# Patient Record
Sex: MALE | Race: WHITE | NOT HISPANIC OR LATINO | Employment: OTHER | URBAN - METROPOLITAN AREA
[De-identification: names, ages, dates, MRNs, and addresses within clinical notes are randomized per-mention and may not be internally consistent; named-entity substitution may affect disease eponyms.]

---

## 2017-03-15 ENCOUNTER — OFFICE VISIT - HEALTHEAST (OUTPATIENT)
Dept: FAMILY MEDICINE | Facility: CLINIC | Age: 57
End: 2017-03-15

## 2017-03-15 DIAGNOSIS — B02.9 SHINGLES: ICD-10-CM

## 2017-03-15 ASSESSMENT — MIFFLIN-ST. JEOR: SCORE: 1707.13

## 2017-03-31 ENCOUNTER — OFFICE VISIT - HEALTHEAST (OUTPATIENT)
Dept: FAMILY MEDICINE | Facility: CLINIC | Age: 57
End: 2017-03-31

## 2017-03-31 ENCOUNTER — COMMUNICATION - HEALTHEAST (OUTPATIENT)
Dept: SCHEDULING | Facility: CLINIC | Age: 57
End: 2017-03-31

## 2017-03-31 DIAGNOSIS — B02.9 SHINGLES: ICD-10-CM

## 2017-04-19 ENCOUNTER — COMMUNICATION - HEALTHEAST (OUTPATIENT)
Dept: FAMILY MEDICINE | Facility: CLINIC | Age: 57
End: 2017-04-19

## 2017-06-22 ENCOUNTER — COMMUNICATION - HEALTHEAST (OUTPATIENT)
Dept: FAMILY MEDICINE | Facility: CLINIC | Age: 57
End: 2017-06-22

## 2017-06-23 ENCOUNTER — OFFICE VISIT - HEALTHEAST (OUTPATIENT)
Dept: FAMILY MEDICINE | Facility: CLINIC | Age: 57
End: 2017-06-23

## 2017-06-23 DIAGNOSIS — G47.00 INSOMNIA: ICD-10-CM

## 2017-06-23 DIAGNOSIS — F32.A ANXIETY AND DEPRESSION: ICD-10-CM

## 2017-06-23 DIAGNOSIS — F41.9 ANXIETY AND DEPRESSION: ICD-10-CM

## 2017-06-23 ASSESSMENT — MIFFLIN-ST. JEOR: SCORE: 1670.79

## 2017-09-13 ENCOUNTER — COMMUNICATION - HEALTHEAST (OUTPATIENT)
Dept: FAMILY MEDICINE | Facility: CLINIC | Age: 57
End: 2017-09-13

## 2017-09-13 DIAGNOSIS — F41.9 ANXIETY AND DEPRESSION: ICD-10-CM

## 2017-09-13 DIAGNOSIS — F32.A ANXIETY AND DEPRESSION: ICD-10-CM

## 2018-01-23 ENCOUNTER — COMMUNICATION - HEALTHEAST (OUTPATIENT)
Dept: FAMILY MEDICINE | Facility: CLINIC | Age: 58
End: 2018-01-23

## 2018-01-23 DIAGNOSIS — F41.9 ANXIETY AND DEPRESSION: ICD-10-CM

## 2018-01-23 DIAGNOSIS — F32.A ANXIETY AND DEPRESSION: ICD-10-CM

## 2018-04-07 ENCOUNTER — HOSPITAL ENCOUNTER (INPATIENT)
Dept: MEDSURG UNIT | Facility: CLINIC | Age: 58
Discharge: NURSING, REHAB OR LONG-TERM CARE | End: 2018-04-12
Attending: INTERNAL MEDICINE | Admitting: INTERNAL MEDICINE
Payer: COMMERCIAL

## 2018-04-07 ENCOUNTER — TRANSFERRED RECORDS (OUTPATIENT)
Dept: HEALTH INFORMATION MANAGEMENT | Facility: CLINIC | Age: 58
End: 2018-04-07

## 2018-04-07 DIAGNOSIS — I63.512 ACUTE ISCHEMIC LEFT MCA STROKE (H): ICD-10-CM

## 2018-04-07 DIAGNOSIS — R10.9 FLANK PAIN, ACUTE: ICD-10-CM

## 2018-04-07 LAB
FIBRINOGEN PPP-MCNC: 424 MG/DL (ref 170–510)
GLUCOSE BLDC GLUCOMTR-MCNC: 84 MG/DL
GLUCOSE BLDC GLUCOMTR-MCNC: 89 MG/DL
PLATELET # BLD AUTO: 317 THOU/UL (ref 140–440)
SODIUM SERPL-SCNC: 143 MMOL/L (ref 136–145)

## 2018-04-08 LAB
AORTIC ROOT: 4.2 CM
AORTIC VALVE MEAN VELOCITY: 91.9 CM/S
ASCENDING AORTA: 4.2 CM
AV DIMENSIONLESS INDEX VTI: 0.7
AV MEAN GRADIENT: 4 MMHG
AV PEAK GRADIENT: 7.7 MMHG
AV VALVE AREA: 3.1 CM2
AV VELOCITY RATIO: 0.7
BSA FOR ECHO PROCEDURE: 2.03 M2
CHLORIDE BLD-SCNC: 114 MMOL/L (ref 98–107)
CHOLEST SERPL-MCNC: 138 MG/DL
CO2 SERPL-SCNC: 20 MMOL/L (ref 22–31)
CV BLOOD PRESSURE: NORMAL MMHG
CV ECHO HEIGHT: 75 IN
CV ECHO WEIGHT: 173 LBS
DOP CALC AO PEAK VEL: 139 CM/S
DOP CALC AO VTI: 30.6 CM
DOP CALC LVOT AREA: 4.52 CM2
DOP CALC LVOT DIAMETER: 2.4 CM
DOP CALC LVOT PEAK VEL: 93.9 CM/S
DOP CALC LVOT STROKE VOLUME: 96.3 CM3
DOP CALCLVOT PEAK VEL VTI: 21.3 CM
EJECTION FRACTION: 63 % (ref 55–75)
FASTING STATUS PATIENT QL REPORTED: YES
FRACTIONAL SHORTENING: 34.5 % (ref 28–44)
GLUCOSE BLDC GLUCOMTR-MCNC: 108 MG/DL
GLUCOSE BLDC GLUCOMTR-MCNC: 88 MG/DL
GLUCOSE BLDC GLUCOMTR-MCNC: 88 MG/DL
GLUCOSE BLDC GLUCOMTR-MCNC: 92 MG/DL
GLUCOSE BLDC GLUCOMTR-MCNC: 99 MG/DL
HDLC SERPL-MCNC: 40 MG/DL
INTERVENTRICULAR SEPTUM IN END DIASTOLE: 0.98 CM (ref 0.6–1)
IVS/PW RATIO: 1
LA AREA 1: 24 CM2
LA AREA 2: 24.1 CM2
LDLC SERPL CALC-MCNC: 81 MG/DL
LEFT ATRIUM LENGTH: 6.1 CM
LEFT ATRIUM SIZE: 3.5 CM
LEFT ATRIUM VOLUME INDEX: 39.7 ML/M2
LEFT ATRIUM VOLUME: 80.6 ML
LEFT VENTRICLE CARDIAC INDEX: 4 L/MIN/M2
LEFT VENTRICLE CARDIAC OUTPUT: 8.1 L/MIN
LEFT VENTRICLE DIASTOLIC VOLUME INDEX: 59.6 CM3/M2 (ref 34–74)
LEFT VENTRICLE DIASTOLIC VOLUME: 121 CM3 (ref 62–150)
LEFT VENTRICLE HEART RATE: 84 BPM
LEFT VENTRICLE MASS INDEX: 88.5 G/M2
LEFT VENTRICLE SYSTOLIC VOLUME INDEX: 22.1 CM3/M2 (ref 11–31)
LEFT VENTRICLE SYSTOLIC VOLUME: 44.8 CM3 (ref 21–61)
LEFT VENTRICULAR INTERNAL DIMENSION IN DIASTOLE: 5.01 CM (ref 4.2–5.8)
LEFT VENTRICULAR INTERNAL DIMENSION IN SYSTOLE: 3.28 CM (ref 2.5–4)
LEFT VENTRICULAR MASS: 179.6 G
LEFT VENTRICULAR OUTFLOW TRACT MEAN GRADIENT: 2 MMHG
LEFT VENTRICULAR OUTFLOW TRACT MEAN VELOCITY: 62.8 CM/S
LEFT VENTRICULAR OUTFLOW TRACT PEAK GRADIENT: 4 MMHG
LEFT VENTRICULAR POSTERIOR WALL IN END DIASTOLE: 1 CM (ref 0.6–1)
LV STROKE VOLUME INDEX: 47.4 ML/M2
MAGNESIUM SERPL-MCNC: 1.9 MG/DL (ref 1.8–2.6)
MITRAL VALVE E/A RATIO: 0.6
MV AVERAGE E/E' RATIO: 6 CM/S
MV DECELERATION TIME: 246 MS
MV E'TISSUE VEL-LAT: 11 CM/S
MV E'TISSUE VEL-MED: 9.46 CM/S
MV LATERAL E/E' RATIO: 5.6
MV MEDIAL E/E' RATIO: 6.5
MV PEAK A VELOCITY: 95.1 CM/S
MV PEAK E VELOCITY: 61.6 CM/S
NUC REST DIASTOLIC VOLUME INDEX: 2760 LBS
NUC REST SYSTOLIC VOLUME INDEX: 75 IN
PHOSPHATE SERPL-MCNC: 2.6 MG/DL (ref 2.5–4.5)
SODIUM SERPL-SCNC: 140 MMOL/L (ref 136–145)
SODIUM SERPL-SCNC: 141 MMOL/L (ref 136–145)
SODIUM SERPL-SCNC: 143 MMOL/L (ref 136–145)
SODIUM SERPL-SCNC: 143 MMOL/L (ref 136–145)
TRICUSPID VALVE ANULAR PLANE SYSTOLIC EXCURSION: 2.5 CM
TRIGL SERPL-MCNC: 86 MG/DL

## 2018-04-08 ASSESSMENT — MIFFLIN-ST. JEOR: SCORE: 1688.08

## 2018-04-09 LAB
AT III ACT/NOR PPP CHRO: 82 % (ref 85–135)
CHLORIDE BLD-SCNC: 109 MMOL/L (ref 98–107)
CO2 SERPL-SCNC: 24 MMOL/L (ref 22–31)
GLUCOSE BLDC GLUCOMTR-MCNC: 101 MG/DL
GLUCOSE BLDC GLUCOMTR-MCNC: 103 MG/DL
GLUCOSE BLDC GLUCOMTR-MCNC: 113 MG/DL
GLUCOSE BLDC GLUCOMTR-MCNC: 88 MG/DL
GLUCOSE BLDC GLUCOMTR-MCNC: 89 MG/DL
PLATELET # BLD AUTO: 257 THOU/UL (ref 140–440)
PROTHROM ACT/NOR PPP: 101 % (ref 60–140)
SODIUM SERPL-SCNC: 142 MMOL/L (ref 136–145)
THROMBIN TIME: 16.8 SEC (ref 13–19)

## 2018-04-09 ASSESSMENT — MIFFLIN-ST. JEOR: SCORE: 1695.8

## 2018-04-10 LAB
ANION GAP SERPL CALCULATED.3IONS-SCNC: 8 MMOL/L (ref 5–18)
BASOPHILS # BLD AUTO: 0.1 THOU/UL (ref 0–0.2)
BASOPHILS NFR BLD AUTO: 1 % (ref 0–2)
BUN SERPL-MCNC: 6 MG/DL (ref 8–22)
CALCIUM SERPL-MCNC: 8.7 MG/DL (ref 8.5–10.5)
CHLORIDE BLD-SCNC: 109 MMOL/L (ref 98–107)
CO2 SERPL-SCNC: 24 MMOL/L (ref 22–31)
CREAT SERPL-MCNC: 0.67 MG/DL (ref 0.7–1.3)
EOSINOPHIL # BLD AUTO: 0.1 THOU/UL (ref 0–0.4)
EOSINOPHIL NFR BLD AUTO: 1 % (ref 0–6)
ERYTHROCYTE [DISTWIDTH] IN BLOOD BY AUTOMATED COUNT: 12.3 % (ref 11–14.5)
FASTING STATUS PATIENT QL REPORTED: YES
GFR SERPL CREATININE-BSD FRML MDRD: >60 ML/MIN/1.73M2
GLUCOSE BLD-MCNC: 94 MG/DL (ref 70–125)
GLUCOSE BLDC GLUCOMTR-MCNC: 102 MG/DL
GLUCOSE BLDC GLUCOMTR-MCNC: 85 MG/DL
GLUCOSE BLDC GLUCOMTR-MCNC: 88 MG/DL
GLUCOSE BLDC GLUCOMTR-MCNC: 91 MG/DL
HCT VFR BLD AUTO: 33.9 % (ref 40–54)
HGB BLD-MCNC: 11.3 G/DL (ref 14–18)
HOMOCYSTEINE PLASMA - HISTORICAL: 9 UMOL/L (ref 0–13)
LA PPP-IMP: NEGATIVE
LYMPHOCYTES # BLD AUTO: 1.6 THOU/UL (ref 0.8–4.4)
LYMPHOCYTES NFR BLD AUTO: 18 % (ref 20–40)
MCH RBC QN AUTO: 31.1 PG (ref 27–34)
MCHC RBC AUTO-ENTMCNC: 33.3 G/DL (ref 32–36)
MCV RBC AUTO: 93 FL (ref 80–100)
MONOCYTES # BLD AUTO: 1.2 THOU/UL (ref 0–0.9)
MONOCYTES NFR BLD AUTO: 14 % (ref 2–10)
NEUTROPHILS # BLD AUTO: 5.9 THOU/UL (ref 2–7.7)
NEUTROPHILS NFR BLD AUTO: 66 % (ref 50–70)
PLATELET # BLD AUTO: 248 THOU/UL (ref 140–440)
PMV BLD AUTO: 10.4 FL (ref 8.5–12.5)
POTASSIUM BLD-SCNC: 3.7 MMOL/L (ref 3.5–5)
PROT C ACT/NOR PPP CHRO: 113 % (ref 70–170)
RBC # BLD AUTO: 3.63 MILL/UL (ref 4.4–6.2)
SODIUM SERPL-SCNC: 140 MMOL/L (ref 136–145)
SODIUM SERPL-SCNC: 140 MMOL/L (ref 136–145)
SODIUM SERPL-SCNC: 141 MMOL/L (ref 136–145)
SODIUM SERPL-SCNC: 161 MMOL/L (ref 136–145)
WBC: 8.8 THOU/UL (ref 4–11)

## 2018-04-10 ASSESSMENT — MIFFLIN-ST. JEOR: SCORE: 1698.52

## 2018-04-11 LAB
GLUCOSE BLDC GLUCOMTR-MCNC: 102 MG/DL
GLUCOSE BLDC GLUCOMTR-MCNC: 109 MG/DL
GLUCOSE BLDC GLUCOMTR-MCNC: 113 MG/DL
GLUCOSE BLDC GLUCOMTR-MCNC: 91 MG/DL
PLATELET # BLD AUTO: 223 THOU/UL (ref 140–440)
SODIUM SERPL-SCNC: 138 MMOL/L (ref 136–145)

## 2018-04-12 ENCOUNTER — RECORDS - HEALTHEAST (OUTPATIENT)
Dept: ADMINISTRATIVE | Facility: OTHER | Age: 58
End: 2018-04-12

## 2018-04-12 ENCOUNTER — HOSPITAL ENCOUNTER (INPATIENT)
Facility: CLINIC | Age: 58
LOS: 5 days | Discharge: HOME | End: 2018-04-17
Attending: PHYSICAL MEDICINE & REHABILITATION | Admitting: PHYSICAL MEDICINE & REHABILITATION
Payer: COMMERCIAL

## 2018-04-12 DIAGNOSIS — K59.01 SLOW TRANSIT CONSTIPATION: ICD-10-CM

## 2018-04-12 DIAGNOSIS — E78.5 HYPERLIPIDEMIA LDL GOAL <70: Primary | ICD-10-CM

## 2018-04-12 DIAGNOSIS — I69.320 APHASIA AS LATE EFFECT OF STROKE: ICD-10-CM

## 2018-04-12 DIAGNOSIS — I63.512 CEREBROVASCULAR ACCIDENT (CVA) DUE TO OCCLUSION OF LEFT MIDDLE CEREBRAL ARTERY (H): ICD-10-CM

## 2018-04-12 PROBLEM — I63.9 STROKE (H): Status: ACTIVE | Noted: 2018-04-12

## 2018-04-12 LAB
FACTOR V LEIDEN - HISTORICAL: NORMAL
GLUCOSE BLDC GLUCOMTR-MCNC: 92 MG/DL
SPECIMEN STATUS: NORMAL
THROMBOPHILIA RISK ASSESSMENT-FVL: NORMAL

## 2018-04-12 PROCEDURE — 12800006 ZZH R&B REHAB

## 2018-04-12 PROCEDURE — 25000132 ZZH RX MED GY IP 250 OP 250 PS 637: Performed by: PHYSICAL MEDICINE & REHABILITATION

## 2018-04-12 RX ORDER — SIMVASTATIN 20 MG
20 TABLET ORAL EVERY EVENING
Status: DISCONTINUED | OUTPATIENT
Start: 2018-04-12 | End: 2018-04-17 | Stop reason: HOSPADM

## 2018-04-12 RX ORDER — POLYETHYLENE GLYCOL 3350 17 G/17G
17 POWDER, FOR SOLUTION ORAL DAILY
Status: DISCONTINUED | OUTPATIENT
Start: 2018-04-13 | End: 2018-04-17 | Stop reason: HOSPADM

## 2018-04-12 RX ORDER — NALOXONE HYDROCHLORIDE 0.4 MG/ML
.1-.4 INJECTION, SOLUTION INTRAMUSCULAR; INTRAVENOUS; SUBCUTANEOUS
Status: DISCONTINUED | OUTPATIENT
Start: 2018-04-12 | End: 2018-04-16

## 2018-04-12 RX ORDER — ASPIRIN 81 MG/1
81 TABLET, CHEWABLE ORAL DAILY
Status: DISCONTINUED | OUTPATIENT
Start: 2018-04-12 | End: 2018-04-17 | Stop reason: HOSPADM

## 2018-04-12 RX ORDER — TRAMADOL HYDROCHLORIDE 50 MG/1
50 TABLET ORAL EVERY 6 HOURS PRN
Status: DISCONTINUED | OUTPATIENT
Start: 2018-04-12 | End: 2018-04-16

## 2018-04-12 RX ORDER — AMOXICILLIN 250 MG
1-2 CAPSULE ORAL 2 TIMES DAILY
Status: DISCONTINUED | OUTPATIENT
Start: 2018-04-12 | End: 2018-04-17 | Stop reason: HOSPADM

## 2018-04-12 RX ORDER — ACETAMINOPHEN 325 MG/1
325-975 TABLET ORAL EVERY 6 HOURS PRN
Status: DISCONTINUED | OUTPATIENT
Start: 2018-04-12 | End: 2018-04-17 | Stop reason: HOSPADM

## 2018-04-12 RX ADMIN — TRAMADOL HYDROCHLORIDE 50 MG: 50 TABLET, COATED ORAL at 20:16

## 2018-04-12 RX ADMIN — ASPIRIN 81 MG CHEWABLE TABLET 81 MG: 81 TABLET CHEWABLE at 20:13

## 2018-04-12 RX ADMIN — SIMVASTATIN 20 MG: 20 TABLET, FILM COATED ORAL at 20:13

## 2018-04-12 RX ADMIN — ACETAMINOPHEN 975 MG: 325 TABLET ORAL at 15:55

## 2018-04-12 RX ADMIN — SENNOSIDES AND DOCUSATE SODIUM 1 TABLET: 8.6; 5 TABLET ORAL at 20:13

## 2018-04-12 NOTE — LETTER
April 17, 2018        Fellow providers, below is copy of discharge summary for continuity care. He will see primary provider Dr. Bruce.  Patient/wife choosing to establish with neurologist through [facility] Dr. Sebastian.    Mr. Zhen Sherman  [address] [place] [address]      Thank you for support, please contact me if you have follow-up questions or concerns.    Reggie Hilton MD  Medical Director, Acute Rehabilitation Center    Department of Physical Medicine and Rehabilitation  [facility]   Discharge Summary      Date Admitted: 4/12/2018  Date Discharge: 4/17/2018     Discharge Disposition: Home with supervision from wife and other friends/family     Discharge Diagnosis:   1. Ischemic stroke left hemisphere secondary to internal carotid artery dissection  2. Significant receptive aphasia, pure word deafness  3. Hyperlipidemia  4. High functioning autism spectrum     Brief History of Presenting Illness and Hospital Course:  57-year-old with left 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  Deficits with severe receptive aphasia, predominant auditory but some visual/reading.  Admitted to inpatient acute rehab 4/12/18.     Herminio participated in physical occupational and speech therapies though PT dropped off quickly after first day evaluation was at high level without any deficits identified.  His predominant deficit remains in the language domain cascading into some of his activities of daily living.  He still has little to no comprehension of spoken words.  He is able to understand written communication fairly well but still needs to be at shorter sentence levels.  There are cognitive domain deficits as well and he does lack some insight into the extent of his deficits.  Because of this we are recommending initial 24 hour  "supervision.  Psychosocially Herminio was in process of divorce with his wife, had been  for 9 months though she is having him come back to stay at her home initially for the supervision.  She is reaching out to other coworkers friends and family to augment.  Herminio is very particular and some of his mannerisms consistent with his high functioning autism.  He has close coworkers at the company he has been at for 30 + years.     Medically no issues came up during his rehab stay. Stroke risk reduction aspirin 81 mg daily. Simvastatin 20 mg daily for cholesterol (new)  Blood pressures well controlled without medication.  Herminio believes his mood is doing okay.  His wife acknowledges this as well.  He has been on fluoxetine in his past but not immediately prior to the stroke.  Continue to monitor.     Discharge Medications:                Zhen Sherman   Home Medication Instructions YASIR:[id number]     Printed on:04/17/18 1935   Medication Information                                       aspirin 81 MG chewable tablet  Take 1 tablet (81 mg) by mouth daily                   senna-docusate (SENOKOT-S;PERICOLACE) 8.6-50 MG per tablet  Take 1-2 tablets by mouth 2 times daily as needed for constipation                   simvastatin (ZOCOR) 20 MG tablet  Take 1 tablet (20 mg) by mouth every evening                      Physical Examination:   /70 (BP Location: Left arm)  Pulse 66  Temp 95.8  F (35.4  C) (Oral)  Resp 12  Ht 1.88 m (6' 2\")  Wt 78.1 kg (172 lb 3.2 oz)  SpO2 100%  BMI 22.11 kg/m2  Fully alert, pleasant.  Communication receptively by him reading written sentences.  He will sometimes right back in response but can also vocalize fairly well.    Heart regular rate and rhythm lungs clear.  Strong symmetric bilateral 5/5 strength and coordination normal     Discharge Instructions:     Active Diet Order      Diet            regular consistency thin liquids  Activity: Okay up independent within closed " environments, 24 hour supervision until communication and safety in different situations better established.  Someone to be with him for all community settings.  Definitely no driving until further screening and likely formal behind the wheel assessment.     Follow up Appointments:  Herminio has received care in several systems, primary provider Dr. Bruce with [facility], initial hospitalization at [facility] transferred to [facility] acute rehab center.  They are choosing to follow-up with a neurologist through [facility] Dr. Sebastian.  Outpatient therapies choosing to do through [facility].

## 2018-04-12 NOTE — H&P
[facility]           ACUTE REHABILITATION [place]                HISTORY AND PHYSICAL NOTE         Patient Name: Zhen Sherman   YOB: 1960  MRN: [id number]     Age / Sex: 57 year old male    Admit Date: 04/12/18    Reason for Admission: intense rehabilitation for left MCA stroke    History of Present Illness  Zhen Sherman is a 57 year old male who is being admitted to the ARU on 04/12/18 as a transfer from [facility] following a left MCA stroke secondary to left ICA dissection.    He was initially brought to the ER by his wife after showing signs of confusion and speech deficits.  He did not have any weakness in his extremities at that time.  Work up is consistent with Cerebral angiogram which showed complete occlusion of the mid cervical left internal carotid due to dissection.  Other workup included a CT scan which showed low attenuation changes in the lateral and posterior aspects of the left MCA distribution.    CTA of the neck showed occlusion of the left ICA beginning just distal to its origin and extending throughout its cervical extent on the left.    MRI showed evolving infarcts in the left insula left temporal lobe and the left temporal occipital cortex.    He also underwent an echocardiogram which showed an EF of 63%, normal right ventricular size wall thickness and wall motion.  He has bicuspid aortic valve without stenosis.    Labs were reviewed today from the papers that were forwarded from the other hospital  Sodium 140  Glucose 91  On 10 April his sodium was 161  Platelets 223    His lipid profile was normal  Protein C homocysteine and factor II assay were normal  Antithrombin III activity was borderline low at suggests congenital or acquired Antithrombin deficiency.  Current plan is to repeat this study in 4-6 weeks to determine the effect of acute thrombotic event.    He was started on aspirin and simvastatin for secondary prevention of  "stroke.    Functionally, the patient was independent with all aspects of mobility and ADLs      Currently, the patient is medically appropriate and is assessed to have needs and will benefit from an inpatient acute rehabilitation comprehensive program working with Physical and Occupational Therapist and will benefit from supervision and management of Rehab Nursing and Rehab MD.     Allergies  Allergies not on file    Past Medical and Surgical History  He has no specific past medical history.    Social History  The patient lives lives in a house with no steps to enter.    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  He has 2 children ages 13 and 11 who alternates staying with him and his wife.  His wife has been currently involved with caring for the patient. She is at her bedside and she reports that she would rather be referred as his wife rather than ex wife, as the divorce is not through     His other system includes his mother and his.  The patient was previously independent with ambulation without use of cane or walker, previously independent with upper and lower body self care, ADLs, and IADLs.         Review of Systems  10 point ROS neg other than the symptoms noted above in the HPI and below:  Denies any pain   Denies any weakness  Some of the ROS given by his wife  Sleeping well  Appetite is good   Remainder of the review of the systems was negative.      Physical Examination  /84  Pulse 72  Temp 97.5  F (36.4  C) (Oral)  Resp 16  Ht 1.88 m (6' 2\")  Wt 78.5 kg (173 lb 1.6 oz)  SpO2 98%  BMI 22.22 kg/m2  General Awake, alert, not in distress  HEENT EOMs intact  Cardiac Regular  Respiratory Clear breath sounds  Abdomen Soft, nontender     Neurologic   Patient is awake and alert   He is sitting in bed  Frustrated  Able to speak fluently   Comprehension is severely impaired    Intermittently  he is able to read and understand what he wrote, at other times, he is unable to " understand   Extremities ate 5/5     Medications  Current Facility-Administered Medications   Medication     aspirin chewable tablet 81 mg     simvastatin (ZOCOR) tablet 20 mg     traMADol (ULTRAM) tablet 50 mg     acetaminophen (TYLENOL) tablet 325-975 mg     senna-docusate (SENOKOT-S;PERICOLACE) 8.6-50 MG per tablet 1-2 tablet     [START ON 4/13/2018] polyethylene glycol (MIRALAX/GLYCOLAX) Packet 17 g     naloxone (NARCAN) injection 0.1-0.4 mg         ASSESSMENT / MANAGEMENT AND INITIATION OF PLAN OF CARE:      POST-ADMISSION EVALUATION:  I have evaluated the patient on admission to the Acute Rehab Center and compared with the preadmission screen, no significant differences are identified and remains appropriate for acute rehabilitation. See below for more details and specifics regarding this admission that supports requiring medical and therapy intensity in the acute rehab setting.      Patient will work with PT for 60 min daily to work on gait exercises, strengthening, endurance buildup, transfers with use of walker as needed.   Patient will work with OT for 60 min daily to work on upper and lower body self care, dressing, toileting, bathing, energy conservation techniques with use of ADs as needed.   Patient will work with SLP for 60 min daily for cognitive evaluation and treatment strategies for higher level cognitive deficits and memory impairment.   Rehab RN to administer medication, patient education on medication taking, VS monitoring, and surgical wound dressing changes and monitoring.     Medical Management:  Left MCA stroke; continues secondary prevention with aspirin and statins    His lipid profile was evaluated this admission and was found to be within normal limits, he is however on statins for secondary prevention of stroke.  Acquired Antithrombin deficiency; needs to be further evaluated with repeat labs in 4-6 weeks.    Bladder, Bowel, GI and DVT ppx   Bladder ;check PVRs ×3 straight cath for  volumes more than 350 cc.  Monitor for symptoms of urinary tract infection, rehab nursing to send for a UA as needed  Bowels; place the patient on a bowel program and titrate medications as needed.  Hold the bowel program in case of loose stools.  Educated patient on Impact with fiber and fluid intake on a bowel function  DVT prophylaxis with mechanical means    Code Status full     Prognosis for medical improvement and stabilization of the medical issues for discharge to home is good.     Estimated Length of Stay: 1-2 weeks      Post Admission Physician Evaluation:     I have compared Zhen Sherman's condition on admission to acute rehabilitation to that outlined in the preadmission screen. History and physical exam performed by me.    No significant differences are identified and the patient remains appropriate for an inpatient rehabilitation facility level of care to manage medical issues and address functional impairments due to Debility/stroke      Comorbid medical conditions being managed:   No specific past medical history he presents with a left MCA stroke leading to aphasia auditory verbal agnosia, hemianopia to the right knee, pronator drift and pathfinding impairment     Prior functional level: Previously independent with mobility and ADLs, although had become less active over the last several months due to progression of illness.      Present function:   Difficulty with path finding, impaired balance, he standby assist with ADLs with poor initiation and attention.     Anticipated rehabilitation course: Anticipate he  will require 2-3 weeks. Anticipate he will be discharged home with hisfamily for support      Will benefit from intensive rehabilitation includin minutes each of PT, OT and SLP - seven days a week. Anticipate he will be able to tolerate the intensity of the therapies.        Rehabilitation nursing; has rehab nursing needs in the reenforcement of the strategies, taught by the  therapists, and bowel and bladder management   Close management by physiatry.      Prognosis:  fair    Estimated length of stay: 1-2 weeks        Karen Silva MD, MHA   Department of Physical Medicine and Rehabilitation  [facility]     Total time spent: 70minutes with more than half the time was spent counseling and / or coordination of care   Includes counseling / education / discussion     Karen Silva MD, A

## 2018-04-12 NOTE — IP AVS SNAPSHOT
MRN:[id number]                      After Visit Summary   4/12/2018    Zhen Sherman    MRN: [id number]           Thank you!     Thank you for choosing [facility] for your care. Our goal is always to provide you with excellent care. Hearing back from our patients is one way we can continue to improve our services. Please take a few minutes to complete the written survey that you may receive in the mail after you visit with us. Thank you!        Patient Information     Date Of Birth          1960        About your hospital stay     You were admitted on:  April 12, 2018 You last received care in the:   ACUTE REHAB [facility]    You were discharged on:  April 17, 2018        Reason for your hospital stay       Admitted to inpatient rehab following stroke and hospitalization.                  Who to Call     For medical emergencies, please call 911.  For non-urgent questions about your medical care, please call your primary care provider or clinic, [phone]          Attending Provider     Provider Specialty    Reggie Hilton MD Physical Medicine and Rehab       Primary Care Provider Office Phone # Fax #    Huy Bruce [phone]      After Care Instructions     Activity       Recommended activity at discharge:   Because of your stroke, you have difficulty with communication especially understanding spoken words.   Also some deficits with problem solving and memory that are difficult to assess because of the language difficulties.  For now recommending initial 24 hour supervision until safety with different activities is more thoroughly established.  You may walk independently within the home but have someone with you for out in the community settings.  No driving at this time. Will need to undergo a formal behind the wheel driving assessment prior to resuming driving.  Return to work timeframe to be determined based on your recovery and improving communication.            Diet        No restrictions to diet.                  Additional Services     Occupational Therapy Referral       Treatment: Evaluation & Treatment  Special Instructions/Modalities: Continue outpatient speech and occupational therapies.  Significant auditory receptive language /aphasia deficits.  Exploring safety in different settings and instrumental ADLs.  Also pre-driving screen and help with formal behind the wheel when ready.    Please be aware that coverage of these services is subject to the terms and limitations of your health insurance plan.  Call member services at your health plan with any benefit or coverage questions.            Speech Therapy Referral       Treatment: Evaluation & Treatment  Special Instructions/Modalities: Continue outpatient speech and occupational therapies.  Significant auditory receptive language /aphasia deficits.  Exploring safety in different settings and instrumental ADLs.  Also pre-driving screen and help with formal behind the wheel when ready.    Please be aware that coverage of these services is subject to the terms and limitations of your health insurance plan.  Call member services at your health plan with any benefit or coverage questions.                  Further instructions from your care team       Follow up appointments:    -- Primary care within 1-2 weeks.  You are scheduled to see Dr. Huy Bruce on Wednesday, April 25th at 10:50. Please arrive 15 minutes early (10:35 am) to check in.    Address  [facility]                          [address] [place] [address]   Phone   [phone]    -- Neurology in 3-4 weeks. Patient / wife outlined wanting to see Dr. Naida Stovall through [facility]. Family will schedule this appointment.  Address  [facility]                          [address] [place] [address]  Phone   " [phone]  Or  Address  [facility]                          [address] [place] [address]  Phone   [phone]    -- You can follow up with Dr. Reggie Hilton on an as needed basis if any functional or rehab issues persist not otherwise covered by your therapists and neurologist.  Call if you would like to schedule.  Address  [facility]                          Physical Medicine and Rehabilitation Clinic    [address] [place] [address]; [address]    [place] [address]    Phone   [phone]    -----------------------------------------------  To reduce the risk of subsequent stroke there are several important factors including optimal management of anticoagulants, blood pressure, cholesterol, diabetes and smoking abstinence.    Anticoagulation:  You are on Aspirin    Blood Pressure:  Keeping your blood pressures less than 130/80 has been shown to reduce risk of recurrent stroke.   You are currently not requiring any medication.     Diabetes:  You do not have diabetes though it is important to continue monitoring for this in the future with your primary provider.    Cholesterol:  Traditional target levels for LDL cholesterol or \"bad cholesterol\" is less than 130 however once you have had a stroke, your target LDL level is now less than 70. Additional recommendations such as increasing your HDL or \"good\" cholesterol and lowering your triglyceride level can also be important.    Your most recent lipid panel was   Total Cholesterol 138   Triglycerides 86   HDL Cholesterol 40   LDL Calculated 81     You were started on simvastatin to further reduce cholesterol and reduce risk for stroke.    This should be followed up in 2-3 months with your primary provider.    Smoking:  Do not start smoking now Herminio!  finally one of the most important modifiable risk factors is to not smoke. This includes cigarettes, pipes, cigars, chewing tobacco and second hand " "smoke. Support through counseling, nicotine replacement, and oral smoking-cessation medications may all be helpful. Often people have been able to quit during their hospitalization but once returning to their familiar environment, the urges can be stronger. If this is the case, we encourage you to get support. There are numerous options, start by talking with your doctor.      Pending Results     No orders found from 4/10/2018 to 2018.            Statement of Approval     Ordered          18 0944  I have reviewed and agree with all the recommendations and orders detailed in this document.  EFFECTIVE NOW     Approved and electronically signed by:  Reggie Hilton MD             Admission Information     Date & Time Provider Department Dept. Phone    2018 Reggie Hilton MD  ACUTE REHAB [phone]      Your Vitals Were     Blood Pressure Pulse Temperature Respirations Height Weight    121/70 (BP Location: Left arm) 66 95.8  F (35.4  C) (Oral) 12 1.88 m (6' 2\") 78.1 kg (172 lb 3.2 oz)    Pulse Oximetry BMI (Body Mass Index)                100% 22.11 kg/m2          [employer] Information     [employer] lets you send messages to your doctor, view your test results, renew your prescriptions, schedule appointments and more. To sign up, go to [email or web address] . Click on \"Log in\" on the left side of the screen, which will take you to the Welcome page. Then click on \"Sign up Now\" on the right side of the page.     You will be asked to enter the access code listed below, as well as some personal information. Please follow the directions to create your username and password.     Your access code is: [email or web address]  Expires: 2018  8:52 AM     Your access code will  in 90 days. If you need help or a new code, please call your [facility] clinic or [phone].        Care EveryWhere ID     This is your Care EveryWhere ID. This could be used by other organizations to access your [facility] medical " records  [id number]        Equal Access to Services     DANNA TITO : Hadii nikolas reaves jose Socarolali, waaxda luqadaha, qaybta kavanessada aliciaanabela, waxalcira elisain hayaashahram vargasnimesh colorado daksha angulo. So [facility] [phone].    ATENCIÓN: Si habla español, tiene a clements disposición servicios gratuitos de asistencia lingüística. Januarynery al [phone].    We comply with applicable federal civil rights laws and [place] laws. We do not discriminate on the basis of race, color, national origin, age, disability, sex, sexual orientation, or gender identity.               Review of your medicines      START taking        Dose / Directions    aspirin 81 MG chewable tablet        Dose:  81 mg   Take 1 tablet (81 mg) by mouth daily   Refills:  0       senna-docusate 8.6-50 MG per tablet   Commonly known as:  SENOKOT-S;PERICOLACE   Used for:  Slow transit constipation        Dose:  1-2 tablet   Take 1-2 tablets by mouth 2 times daily as needed for constipation   Refills:  0       simvastatin 20 MG tablet   Commonly known as:  ZOCOR   Used for:  Hyperlipidemia LDL goal <70        Dose:  20 mg   Take 1 tablet (20 mg) by mouth every evening   Quantity:  30 tablet   Refills:  0            Where to get your medicines      These medications were sent to [facility] [place], [place] - [address]  [address] [address]     Phone:  [phone]     simvastatin 20 MG tablet         Some of these will need a paper prescription and others can be bought over the counter. Ask your nurse if you have questions.     You don't need a prescription for these medications     aspirin 81 MG chewable tablet    senna-docusate 8.6-50 MG per tablet                Protect others around you: Learn how to safely use, store and throw away your medicines at www.disposemymeds.org.             Medication List: This is a list of all your medications and when to take them. Check marks below indicate your daily home schedule. Keep  this list as a reference.      Medications           Morning Afternoon Evening Bedtime As Needed    aspirin 81 MG chewable tablet   Take 1 tablet (81 mg) by mouth daily   Last time this was given:  81 mg on 4/16/2018  9:20 PM                     8 PM           senna-docusate 8.6-50 MG per tablet   Commonly known as:  SENOKOT-S;PERICOLACE   Take 1-2 tablets by mouth 2 times daily as needed for constipation   Last time this was given:  1 tablet on 4/17/2018  7:55 AM                                   simvastatin 20 MG tablet   Commonly known as:  ZOCOR   Take 1 tablet (20 mg) by mouth every evening   Last time this was given:  20 mg on 4/16/2018  9:20 PM                     8 PM

## 2018-04-12 NOTE — IP AVS SNAPSHOT
[facility]    [address] [place] [id number]    Phone:  [phone]                                       After Visit Summary   4/12/2018    Zhen Sherman    MRN: [id number]           After Visit Summary Signature Page     I have received my discharge instructions, and my questions have been answered. I have discussed any challenges I see with this plan with the nurse or doctor.    ..........................................................................................................................................  Patient/Patient Representative Signature      ..........................................................................................................................................  Patient Representative Print Name and Relationship to Patient    ..................................................               ................................................  Date                                            Time    ..........................................................................................................................................  Reviewed by Signature/Title    ...................................................              ..............................................  Date                                                            Time

## 2018-04-12 NOTE — PLAN OF CARE
Problem: Goal/Outcome  Goal: Goal Outcome Summary  Outcome: No Change  FOCUS/GOAL  Bowel management, Bladder management, Nutrition/Feeding/Swallowing precautions and Medical management    ASSESSMENT, INTERVENTIONS AND CONTINUING PLAN FOR GOAL:  Pt is cont of B & B per report, and ate good lunch on the unit. Pt's wife helped with communicating with staff and ordering meals for him. Pt transferred with supervision, no AD used. Pt at times c/o headache and [place] gave him Tylenol per report and denied any pain so far, even his wife conformed it.   Nursing will cont with admission cares.

## 2018-04-13 PROCEDURE — 40000225 ZZH STATISTIC SLP WARD VISIT

## 2018-04-13 PROCEDURE — 25000132 ZZH RX MED GY IP 250 OP 250 PS 637: Performed by: PHYSICAL MEDICINE & REHABILITATION

## 2018-04-13 PROCEDURE — 40000133 ZZH STATISTIC OT WARD VISIT

## 2018-04-13 PROCEDURE — 97161 PT EVAL LOW COMPLEX 20 MIN: CPT | Mod: GP | Performed by: PHYSICAL THERAPIST

## 2018-04-13 PROCEDURE — 97530 THERAPEUTIC ACTIVITIES: CPT | Mod: GP | Performed by: PHYSICAL THERAPIST

## 2018-04-13 PROCEDURE — 92523 SPEECH SOUND LANG COMPREHEN: CPT | Mod: GN

## 2018-04-13 PROCEDURE — 12800006 ZZH R&B REHAB

## 2018-04-13 PROCEDURE — 97166 OT EVAL MOD COMPLEX 45 MIN: CPT | Mod: GO

## 2018-04-13 PROCEDURE — 97535 SELF CARE MNGMENT TRAINING: CPT | Mod: GO

## 2018-04-13 PROCEDURE — 40000193 ZZH STATISTIC PT WARD VISIT: Performed by: PHYSICAL THERAPIST

## 2018-04-13 RX ADMIN — ACETAMINOPHEN 975 MG: 325 TABLET ORAL at 05:16

## 2018-04-13 RX ADMIN — SIMVASTATIN 20 MG: 20 TABLET, FILM COATED ORAL at 19:49

## 2018-04-13 RX ADMIN — ACETAMINOPHEN 975 MG: 325 TABLET ORAL at 19:49

## 2018-04-13 RX ADMIN — SENNOSIDES AND DOCUSATE SODIUM 1 TABLET: 8.6; 5 TABLET ORAL at 19:50

## 2018-04-13 RX ADMIN — ACETAMINOPHEN 975 MG: 325 TABLET ORAL at 13:20

## 2018-04-13 RX ADMIN — ASPIRIN 81 MG CHEWABLE TABLET 81 MG: 81 TABLET CHEWABLE at 19:49

## 2018-04-13 RX ADMIN — TRAMADOL HYDROCHLORIDE 50 MG: 50 TABLET, COATED ORAL at 17:31

## 2018-04-13 NOTE — PLAN OF CARE
Problem: Patient Care Overview  Goal: Plan of Care/Patient Progress Review  FOCUS/GOAL  Pain management and Cognition/Memory/Judgment/Problem solving    ASSESSMENT, INTERVENTIONS AND CONTINUING PLAN FOR GOAL:  Pt reported headache mid shift, PRN tylenol given x1 - writer initiated pain management, continue to encourage pt to advocate for pain management, absence of nonverbal pain indicators with follow-up. Denied SOB, denied difficulty breathing. Denied numbness or tingling. Family visited today. Per SLP, plan to have iPad in patient's room so staff speaks and iPad can type out what writer is saying - assisting with written communication. Please refer to SLP note. Advanced to independent in room per therapy - per order, monitor if need for wander guard. Call light within reach, check-ins provided. Continue with POC.

## 2018-04-13 NOTE — PLAN OF CARE
Problem: Goal/Outcome  Goal: Goal Outcome Summary  Outcome: Improving  Patient was admitted today from [facility].  He has agnosia. Unable to understand verbal communication from others. He is able to understand about 60% of written communication.  Yes/no answers are not always accurate. He was able to dial room service independently but then was not able to understand what the message meant about being closed after 6:30 PM.    One PVR done for 0cc.  Ate dinner his wife brought.  Patient states he feels depressed.  Note left for MD.  His wife states he may have been on prozac but she was unable to find the bottle to verify. Has a history of trying to harm himself however denies feeling that he will harm himself, no plan.  PLC stroke class ordered, wife to also attend.  Tylenol and ultram given for headache with relief.  Rash noted on torso. Denies itching. Note left for MD.

## 2018-04-13 NOTE — PLAN OF CARE
Problem: Goal/Outcome  Goal: Goal Outcome Summary  Outcome: Improving  FOCUS/GOAL  Medical management    ASSESSMENT, INTERVENTIONS AND CONTINUING PLAN FOR GOAL:  Patient was admitted yesterday. He slept well. He used call light to call when he had a frontal headache around 0515, relief with Tylenol. He had Taken Tylenol and Ultram in the evening for HA also. He has Agnosia, sometimes unable to understand what is communicated to him, better to used yes/no questions. He is able to read what is written and can respond verbally. He has a writing pad. He is very impulsive, while writer was by the computer in his room, patient got out of bed and quickly walker to the toilet and closed the door. He voided and PVR was 0 cc. Continue bed alarm. He will move to room 503 today.

## 2018-04-13 NOTE — PROGRESS NOTES
"CLINICAL NUTRITION SERVICES - ASSESSMENT NOTE     Nutrition Prescription    RECOMMENDATIONS FOR MDs/PROVIDERS TO ORDER:  None    Malnutrition Status:    Patient does not meet criteria    Recommendations already ordered by Registered Dietitian (RD):  Boost Plus with dinner    Future/Additional Recommendations:  Obtain food preferences as able to assist in meal ordering  Adjust supplements as needed pending pt acceptance.     REASON FOR ASSESSMENT  Zhen Sherman is a/an 57 year old male assessed by the dietitian for Admission Nutrition Risk Screen for reduced oral intake over the last month    NUTRITION HISTORY  Limited nutrition history obtained from patient, however patient indicated he was eating well at home prior to hospital admission. He notes intake has been reduced just in the hospital. He is conscious of sodium content of foods.    CURRENT NUTRITION ORDERS  Diet: Regular  Intake/Tolerance: Patient noted to eat well for lunch and dinner yesterday. He is ordering smaller meals. He has been eating less since being in the hospital.    LABS  Labs reviewed    MEDICATIONS  Medications reviewed    ANTHROPOMETRICS  Height: 188 cm (6' 2\")  Most Recent Weight: 78.5 kg (173 lb 1.6 oz)    IBW: 83.6 kg  BMI: Normal BMI  Weight History: Patient reports current weight is consistent with baseline weight.  Wt Readings from Last 5 Encounters:   04/12/18 78.5 kg (173 lb 1.6 oz)     Dosing Weight: 79 kg (current weight)    ASSESSED NUTRITION NEEDS  Estimated Energy Needs: 4425-8038 kcals/day (25 - 30 kcals/kg)  Justification: Maintenance  Estimated Protein Needs: 79-95 grams protein/day (1 - 1.2 grams of pro/kg)  Justification: Increased needs  Estimated Fluid Needs: 1 mL/kcal or per MD goals   Justification: Maintenance    PHYSICAL FINDINGS  See malnutrition section below.     MALNUTRITION  % Intake: Decreased intake does not meet criteria  % Weight Loss: None noted  Subcutaneous Fat Loss: None observed  Muscle Loss: None " observed  Fluid Accumulation/Edema: None noted  Malnutrition Diagnosis: Patient does not meet two of the above criteria necessary for diagnosing malnutrition    NUTRITION DIAGNOSIS  Predicted inadequate nutrient intake (energy/ protein) related to reduced appetite as evidenced by anticipated intake less than estimated needs.      INTERVENTIONS  Implementation  Nutrition Education: reviewed menu with patient. Suggested he indicate on the menu foods he does not like by crossing them off in order to aid in Room Service Assist.   Medical food supplement therapy     Goals  Patient to consume % of nutritionally adequate meal trays TID, or the equivalent with supplements/snacks.     Monitoring/Evaluation  Progress toward goals will be monitored and evaluated per protocol.        Sasha Nunez RD

## 2018-04-13 NOTE — PROGRESS NOTES
04/13/18 0900   Quick Adds   Type of Visit Initial Occupational Therapy Evaluation   Living Environment   Lives With alone   Living Arrangements house   Home Accessibility bed and bath on same level   Transportation Available car;family or friend will provide   Living Environment Comment Patient will d/c to his wife's home; she will provide 24/7 with combination of herself and hire in support. Spoke with pt's wife about return home with 24/7 supervision, not returning to driving initially and ongoing therapy via OP.     Self-Care   Dominant Hand right   Usual Activity Tolerance excellent   Current Activity Tolerance good   Equipment Currently Used at Home none   Activity/Exercise/Self-Care Comment Patient was IND with all ADLs, IADLs, drove and worked full time    Functional Level Prior   Ambulation 0-->independent   Transferring 0-->independent   Toileting 0-->independent   Bathing 0-->independent   Dressing 0-->independent   Eating 0-->independent   Communication 0-->understands/communicates without difficulty   Swallowing 0-->swallows foods/liquids without difficulty   Cognition 0 - no cognition issues reported   Fall history within last six months no   Which of the above functional risks had a recent onset or change? communication/speech   General Information   Onset of Illness/Injury or Date of Surgery - Date 04/09/18   Referring Physician Dr Hilton    Patient/Family Goals Statement return home    Additional Occupational Profile Info/Pertinent History of Current Problem He was initially brought to the ER by his wife after showing signs of confusion and speech deficits.  He did not have any weakness in his extremities at that time.   Precautions/Limitations no known precautions/limitations   Cognitive Status Examination   Orientation orientation to person, place and time   Cognitive Comment Unable understand verbal communication, able to understand ~50-60% of written communication. Use of IPAD to communicate.  Will require further assessment, today able to demonstrate ability to complete simple meal prep with memory recall of instructions, but due to difficulty comprehending written instructions unable to follow via instructions. Able to re-construct 1D picture into 3D image. Able to follow cues for locating therapy gym and able to re-locate his room with memory recall and no verbal cues. Demonstrates understanding of his schedule and appointment time and type of therapy.    Visual Perception   Visual Perception No deficits were identified   Sensory Examination   Sensory Quick Adds No deficits were identified   Pain Assessment   Patient Currently in Pain No   Integumentary/Edema   Integumentary/Edema no deficits were identifed   Posture   Posture not impaired   Range of Motion (ROM)   ROM Comment full AROM of B UEs    Strength   Strength Comments B  UEs 5/5 gross strength    Muscle Tone Assessment   Muscle Tone Quick Adds No deficits were identified   Coordination   Upper Extremity Coordination No deficits were identified   ARC Assessment Only   Acute Rehab FIM See FIM scores for Mobility/ADL Assessment   General Therapy Interventions   Planned Therapy Interventions IADL retraining;cognition   Clinical Impression   Criteria for Skilled Therapeutic Interventions Met yes, treatment indicated   OT Diagnosis decreased independence with IADLs, impaired communication/ impaired cognition     Influenced by the following impairments impaired recpetive communication, impaired cognition    Assessment of Occupational Performance 1-3 Performance Deficits   Identified Performance Deficits Pt demonstrates the following deficits; driving, hoome management and work    Clinical Decision Making (Complexity) Moderate complexity   Therapy Frequency daily   Predicted Duration of Therapy Intervention (days/wks) 5 days    Anticipated Discharge Disposition Home with Assist   Risks and Benefits of Treatment have been explained. Yes   Patient,  Family & other staff in agreement with plan of care Yes   Total Evaluation Time   Total Evaluation Time (Minutes) 35

## 2018-04-13 NOTE — PROGRESS NOTES
04/13/18 1251   Quick Adds   Type of Visit Initial PT Evaluation   Living Environment   Lives With alone   Living Arrangements house   Home Accessibility no concerns   Number of Stairs to Enter Home 0   Number of Stairs Within Home (uncertain at this writing but stairs not a barrier)   Transportation Available car;family or friend will provide   Self-Care   Dominant Hand right   Usual Activity Tolerance excellent   Current Activity Tolerance good   Regular Exercise yes   Activity/Exercise/Self-Care Comment golf, walk   Functional Level Prior   Ambulation 0-->independent   Transferring 0-->independent   Toileting 0-->independent   Bathing 0-->independent   Dressing 0-->independent   Eating 0-->independent   Communication 0-->understands/communicates without difficulty   Swallowing 0-->swallows foods/liquids without difficulty   Cognition 0 - no cognition issues reported   Fall history within last six months no   Which of the above functional risks had a recent onset or change? communication/speech   General Information   Referring Physician Desiree Hilton   Patient/Family Goals Statement return home   Pertinent History of Current Problem (include personal factors and/or comorbidities that impact the POC) (L) MCA stroke w/ three focal infarct following ICA dissection   Precautions/Limitations no known precautions/limitations   Cognitive Status Examination   Orientation orientation to person, place and time   Level of Consciousness alert   Follows Commands and Answers Questions 75% of the time;able to follow single-step instructions   Personal Safety and Judgment intact   Memory intact   Pain Assessment   Patient Currently in Pain No   Integumentary/Edema   Integumentary/Edema no deficits were identifed   Posture    Posture Not impaired   Range of Motion (ROM)   ROM Quick Adds No deficits were identified   Strength   Strength Comments Not focal deficits, grossly 5/5   ARC Assessment Only   Acute Rehab FIM See  FIM scores for Mobility/ADL Assessment   Balance   Balance Comments performed running, jumping, bounding activity w/o LOB   Sensory Examination   Sensory Perception Comments intact hot cold, vibration, no nystagmus or visual field changes   Coordination   Coordination no deficits were identified   Muscle Tone   Muscle Tone no deficits were identified   Clinical Impression   Criteria for Skilled Therapeutic Intervention evaluation only;no problems identified which require skilled intervention   Influenced by the following impairments none   Functional limitations due to impairments none   Clinical Presentation Stable/Uncomplicated   Anticipated Discharge Disposition Home   Clinical Impression Comments 58 y/o male seen for PT eval following (L) ICA dissection and 3 focal lesions in (L) brain, no functional deficits noted which would require further PT intervention.   Total Evaluation Time   Total Evaluation Time (Minutes) 15

## 2018-04-13 NOTE — PLAN OF CARE
Problem: Patient Care Overview  Goal: Plan of Care/Patient Progress Review  Physical Therapy Discharge Summary    Reason for therapy discharge:    All goals and outcomes met, no further needs identified.    Progress towards therapy goal(s). See goals on Care Plan in [facility] electronic health record for goal details.  Goals met    Therapy recommendation(s):    No further therapy is recommended.

## 2018-04-13 NOTE — PLAN OF CARE
Problem: Patient Care Overview  Goal: Plan of Care/Patient Progress Review  OT evaluation completed, Anticipate 5 day LOS to meet outlined goals.   Will require 24/7 supervision at d/c and family training for safety and possible need for further OP services.

## 2018-04-13 NOTE — PROGRESS NOTES
04/13/18 1700   General Information, SLP   Type of Evaluation Speech and Language   Type of Visit Initial   Start of Care Date 04/13/18   Onset of Illness/Injury or Date of Surgery - Date 04/07/18   Referring Physician Dr. 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  His spontaneous language is intact.  Repeat MRI scan shows evolving infarct in the left insula, left temporal and temporal occipital cortex and temporal infarct is the likely etiology of patient's speech symptoms.   General Info Comments Seen by speech therapy during acute care hospitalization.  Initial education and resources provided regarding pure word deafness.  Family understanding of diagnosis but due to rare nature of diagnosis further staff education required.  Recommended that primary goal of therapy be on compensatory strategies to facilitate return to home.  Patient was also seen for dysphagia with recommendation for regular diet and thin liquids without use of any straw.    Language: Auditory Comprehension (understanding of spoken language)   Functional Assessment Scale (Auditory Comprehension) Severe Impairment   Comments (Auditory Comprehension) Due to nature of diagnosis, patient was unable to follow any verbally presented commands.  Thus auditory comprehension is profoundly impacted.  Multiple subtests from auditory comprehension test were presented in a written format.  In which patient was able to follow one-step directions, answers simple yes no questions as well as some open ended questions.  It was apparent that patient was confusing pronouns and having some comprehension difficulties in the written modality.   Language: Verbal Expression (use of spoken language  to express information)   [place] Naming Test, short form (out of 15 total) 7   Functional Assessment Scale (Verbal Expression) Minimal Impairment   Comments (Verbal Expression) Typical verbal expression measures not completed due to focus on reading and comprehension.  Patient's verbal expression is fully intact.  Does have significant difficulties in understanding the purpose of various tasks leading to errors in verbal expression tasks.   Reading Comprehension (understanding of written language)   Simple Phrase/Sentence (out of 15 total) 13   Commands (out of 5 total) 4   Functional Assessment Scale (Reading Comprehension) Moderate Impairment   Comments (Reading Comprehension) It is apparent patient has difficulties understanding anything longer than a sentence composed of a few words as well as more linguistically complex tasks.  Relative to auditory comprehension abilities in reading is significantly better than auditory comprehension.   Written Expression (use of writing to express information)   Functional Assessment Scale (Written Expression) Minimal Impairment   Comments (Written Expression) Did not complete tasks within normal test but patient is communicating frequently through written modality.  Suspect that as staff and family are communicating with patient via writing patient is reciprocating and also writing down information despite being unnecessary.  When riding in his notebook patient is writing and isolated words as needed for the tasks.  Suspect that patient would be able to write in more complete sentences if that were required of him but further exploration needed.   Cognitive Status Examination   Cognitive Status Exam Comments Difficult to assess cognition due to severity of auditory comprehension.  Patient is utilizing appropriate strategies to try to communicate.  Do anticipate need for near 24 7 supervision due to inability to communicate with others consistently.   Clinical Impression, SLP  Eval   Criteria for Skilled Therapeutic Interventions Met Yes   SLP Diagnosis Verbal auditory agnosia, also known as pure word deafness   Influenced by the following factors/impairments Inability to comprehend verbally presented information   Functional limitations due to impairments Unable to understand the directions provided by others when presented verbally.   Rehab Potential Good, to achieve stated therapy goals   Rehab potential affected by Family support, recent onset.   Therapy Frequency Daily   Predicted Duration of Therapy Intervention (days/wks) Less than 1 week for inpatient goals.  Extended ongoing therapy upon discharge   Anticipated Discharge Disposition Home with Outpatient Therapy   Risks and Benefits of Treatment have been explained. Yes   Patient, Family & other staff in agreement with plan of care Yes   Clinical Impression Comments Patient has been diagnosed with verbal auditory agnosia, also known as peer word deafness.  Patient is unable to understand any verbally presented information.  Patient is able to speak fluently as well as write.  Primary method for understanding the speech of others is through reading, gestures and pictures.  There are some impairments in reading comprehension limiting patient to short phrases and sentences.  Gestures helpful for augmenting receptive communication.  Primary focus of therapy during acute rehab stay will be on compensatory strategies facilitating patient's return to home with family support.  Patient will require extended course of ongoing therapy in outpatient setting upon facility discharge.  Current level of function is significantly below baseline and patient would benefit from skilled intervention to maximize ability to understand others and ultimately be able to get his wants and needs met.   Total Evaluation Time      Total Evaluation Time (Minutes) 60

## 2018-04-13 NOTE — PLAN OF CARE
Problem: Patient Care Overview  Goal: Plan of Care/Patient Progress Review    Patient has been diagnosed with verbal auditory agnosia, also known as peer word deafness.  Patient is unable to understand any verbally presented information.  Patient is able to speak fluently as well as write.  Primary method for understanding the speech of others is through reading, gestures and pictures.  There are some impairments in reading comprehension limiting patient to short phrases and sentences.  Gestures helpful for augmenting receptive communication.  Primary focus of therapy during acute rehab stay will be on compensatory strategies facilitating patient's return to home with family support.  Patient will require extended course of ongoing therapy in outpatient setting upon facility discharge.  Current level of function is significantly below baseline and patient would benefit from skilled intervention to maximize ability to understand others and ultimately be able to get his wants and needs met.    Significant amount of time spent with spouse reassuring her of ability to help patient.  Discussed with her next steps of therapy upon discharging.  Discussed potential locations including [facility] with their intensive aphasia program.  Family is very aware of the nature of the deficits and is able to provide assistance upon discharge.  Patient and family very apparently overwhelmed at times with the overall situation.

## 2018-04-13 NOTE — PROGRESS NOTES
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  Deficits with severe receptive aphasia, predominant auditory but some visual/reading.  Admitted to inpatient acute rehab 4/12/18.           Assessment and Plan of Care:   Just getting her arms around gym and his deficits.  Continues with severe inability to understand verbal communication.  Written communication is partially helpful.  Seems to be more effective than just pictures.  Mobility seems to be excellent, he even did some jogging with physical therapy so they will discontinue shifting majority of time to speech therapy with some OT.    I had longer conversation with Zhen wife, someone Herminio though his frustrations and understanding significant enough to have some my conversation off line.  Herminio and his wife have been  for 9 months and in process of divorce though current plan is he will stay with her.  They have 2 daughters.  Wife is feeling overwhelmed with what support Herminio may need.  We talked throughSuspect most of that would be different scenarios at length and social work also participated.  While there is some hire in support options, most of these would be with nurse assistant level skills set and the appreciation and nuances and working with somebody with this specific aphasia may be a challenge.  Ultimately it might be best for a small number of friends/family to participate together in providing the oversight and supervision Herminio will need.  We also exploring how he will do with different instrumental ADL situations such as safety in the kitchen to help guide home transition and safe plan.    Herminio has high-level autism at baseline is very particular and on the move.  This plays into our plan of care.  He is already saying that he wants to get home and he can do everything there that  he is doing here.  From a physical standpoint he is correct the part of his deficit is lacking the full insight and appreciation of his deficits.  We will do our best to keep him here for initial intense assessment as above.  Hopefully we will be able to get into Monday or Tuesday before his frustrations dominate or discharge planning.  For sure he will need intensive ongoing outpatient speech therapy.  Part of the longer conversation with his wife included the nature intense therapy but also recognizing that the vast majority of his recovery is time based and not intensity based.    Medically:  Stroke risk reduction aspirin 81 mg daily  Simvastatin 20 mg daily for cholesterol  Blood pressures well controlled without medication.    Continue therapies and plan of care         Review of Systems:   Very limited to ineffective given his communication deficit  Does not seem to be having any pain.  There are some frustrations he is expressing and wanting to get home.          Physical Exam:     Vitals:    04/12/18 2011 04/13/18 0029 04/13/18 0526 04/13/18 0745   BP: 135/77 124/62 125/74 124/75   BP Location:  Left arm Left arm Left arm   Pulse: 68 62 61 68   Resp: 16 20 16    Temp: 97.3  F (36.3  C) 97  F (36.1  C) 97.6  F (36.4  C) 97.4  F (36.3  C)   TempSrc: Oral Oral Oral Oral   SpO2: 97% 95% 98% 98%   Weight:       Height:         Fully alert fluent expressive language with content partially associated with our conversation.  Receptive language profoundly impaired with auditory comprehension.  He is able to read some sentences and partially he was to internalize that information and track some of the direction.  Very stable gait pattern no loss of balance  Breathing easy, no cough or wheeze         Data:   Scheduled meds    aspirin  81 mg Oral Daily     simvastatin  20 mg Oral QPM     senna-docusate  1-2 tablet Oral BID     polyethylene glycol  17 g Oral Daily       PRN meds:  traMADol, acetaminophen, naloxone          Floor time:     25 minutes  Face-to-face: 50 minutes  Total time:      75 minutes

## 2018-04-13 NOTE — PROVIDER NOTIFICATION
Social Work: Initial Assessment with Discharge Plan    Patient Name: Zhen Sherman  : 1960  Age: 57 year old  MRN: [id number]  Completed assessment with: Patient's wife  Admitted to ARU: 18    Presenting Information   Date of SW assessment: 2018  Health Care Directive: None filed.   Primary Health Care Agent: Pt's   Secondary Health Care Agent: NA   Living Situation: Prior to hospitalization, pt lived alone in a house.   Previous Functional Status: Independent   DME available: Therapies to assess.   Patient and family understanding of hospitalization: Per discussion with team, pt appears to have frustration with hospitalization and does not fully understand why he is here. Pt's wife   Cultural/Language/Spiritual Considerations: English-speaking       Physical Health  Reason for admission: No diagnosis found.    Provider Information   Primary Care Physician:Huy Brcue   : Unknown     Mental Health/Chemical Dependency:   Diagnosis: None listed in Problem List.  Alcohol/Tobacco/Narcotis: Unknown  Support/Services in Place: Unknown   Services Needed/Recommended: None at this time.   Sexuality/Intimacy: Did not address.     Support System  Marital Status: Per discussion with team, pt/wife were in the process of  and currently live separately.   Family support: Pt has limited family support of his own. Pt's wife reports that she has family that is supportive and may be able to assist with support for pt or as extra support for their children as she and others provide support for pt.   Other support available: Pt's wife is exploring what supports are available. She is wondering if a retired co-worker may be able to be available at d/c.    Community Resources  Current in home services: None identified.  Previous services: None identified.     Financial/Employment/Education  Employment Status: Pt was employed full-time as a CPA.  Income Source: Employment   Education:  "Unknown   Financial Concerns:  None identified.   Insurance: BCBS    Discharge Plan   Patient and family discharge goal: Return home with family support  Discussed private duty home health aide resources with pt's wife. Pt's wife also exploring family/friend options for support.  Provided Education on discharge plan: Discussed private duty home health aide resources with pt's wife and provided list of agencies. Pt's wife also exploring family/friend options for support.  Patient agreeable to discharge plan:  Per discussion with family and team, pt is frustrated with hospitalization and wants to get home.   Provided education and attained signature for Medicare IM and IRF Patient Rights and Privacy Information provided to patient : NA   Provided patient with [facility] [place] Resources: No  Barriers to discharge: Medical stability, progression with therapies     Discharge Recommendations   Disposition: Home with continued family support   Transportation Needs: Family will transport at d/c  Name of Transportation Company and Phone: NA     Additional comments   Attempted to meet with pt, but he declined meeting with SW stating \"I can't talk to you.\" Of note, pt has \"high-level autism\" at baseline and presented to hospital due to \"left 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  Deficits with severe receptive aphasia, predominant auditory but some visual/reading\" per MD note. Met with pt's wife, along with MD, and obtained most assessment info from this. Pt's wife had time constraints due to their children also being present. Provided SW contact info for follow-up, as she was quite overwhelmed and receptive to speaking by phone at her convenience.     Please invite to Care Conference:  Wife: Shanique Mahan ([phone])        04/13/18 1400   Living Arrangements   Lives With alone   Living Arrangements house   Discharge Needs Assessment   Transportation " Available car;family or friend will provide   Coping/Stress   Major Change/Loss/Stressor illness;inadequate services;loss of independence;marriage;medical condition/diagnosis;residence change     LUCY Morse, [facility]  - [facility]  Pager: [phone]  [email or web address]     NO LETTER

## 2018-04-14 PROCEDURE — 12800006 ZZH R&B REHAB

## 2018-04-14 PROCEDURE — 92507 TX SP LANG VOICE COMM INDIV: CPT | Mod: GN | Performed by: SPEECH-LANGUAGE PATHOLOGIST

## 2018-04-14 PROCEDURE — 25000132 ZZH RX MED GY IP 250 OP 250 PS 637: Performed by: PHYSICAL MEDICINE & REHABILITATION

## 2018-04-14 PROCEDURE — 40000225 ZZH STATISTIC SLP WARD VISIT: Performed by: SPEECH-LANGUAGE PATHOLOGIST

## 2018-04-14 PROCEDURE — 97530 THERAPEUTIC ACTIVITIES: CPT | Mod: GO

## 2018-04-14 PROCEDURE — 97535 SELF CARE MNGMENT TRAINING: CPT | Mod: GO

## 2018-04-14 PROCEDURE — 40000133 ZZH STATISTIC OT WARD VISIT

## 2018-04-14 RX ADMIN — SENNOSIDES AND DOCUSATE SODIUM 1 TABLET: 8.6; 5 TABLET ORAL at 08:04

## 2018-04-14 RX ADMIN — SIMVASTATIN 20 MG: 20 TABLET, FILM COATED ORAL at 19:27

## 2018-04-14 RX ADMIN — POLYETHYLENE GLYCOL 3350 17 G: 17 POWDER, FOR SOLUTION ORAL at 08:04

## 2018-04-14 RX ADMIN — ASPIRIN 81 MG CHEWABLE TABLET 81 MG: 81 TABLET CHEWABLE at 19:27

## 2018-04-14 RX ADMIN — ACETAMINOPHEN 975 MG: 325 TABLET ORAL at 08:04

## 2018-04-14 RX ADMIN — SENNOSIDES AND DOCUSATE SODIUM 2 TABLET: 8.6; 5 TABLET ORAL at 19:27

## 2018-04-14 RX ADMIN — ACETAMINOPHEN 975 MG: 325 TABLET ORAL at 18:35

## 2018-04-14 NOTE — PLAN OF CARE
Problem: Goal/Outcome  Goal: Goal Outcome Summary  Outcome: No Change  FOCUS/GOAL  Bladder management and Pain management    ASSESSMENT, INTERVENTIONS AND CONTINUING PLAN FOR GOAL:  Pt slept well, able to communicate needs via Ipad. Denies pain and discomfort, Uses urinal at bedside staff assists with emptying. NSG to continue monitoring.

## 2018-04-14 NOTE — PLAN OF CARE
"Problem: Patient Care Overview  Goal: Plan of Care/Patient Progress Review  SLP: worked on various tasks for comprehension and expression. Continuing to write and/or voice to text on IPAD for him. Also accompanying messages with verbal output and gestures.  Pt has difficulty with reading comprehension beyond 2 sentence length, Also difficulty noted with following directions at times especially for tasks that are not \"automatic.\".  Noting approximately 75% accuracy for naming/simple description to pictured items.  Worked on learning couple IPAD tasks, to work on in open time/also to informally assess cognitive skills.  He was able to learn 2 basic tasks for matching, and planning/reasoning, but did note at least mild difficulty as task progressed, and also at least some impulsivity in completing/decision making.  In discussion with nursing - appears there have been several occasions when pt appeared to understand comments from staff without any additional visual cues (?not certain if actually understands or anticipates)      "

## 2018-04-14 NOTE — PLAN OF CARE
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 Able to make needs known with prompting utilizing written communication and note austin on Ipad (speech to text function). Room service with select d/c'd due to pt frustration, pt care order put in for nursing to assist pt with meals. See SLP note for additional information. Pt able to verbalize needs. Pt continent of bladder using toilet. Independent in the room today per therapy. Per pt's wife, pt was upset when pt's wife reinforced that they would still be continuing with the divorce. Pt continues to have flat affect but will increase visualization of pt for safety. Pt c/o HA tolerated with prn Tramadol x1. Pt tolerating regular/thin diet, able to take pills whole. Continue POC.

## 2018-04-14 NOTE — PLAN OF CARE
"Problem: Patient Care Overview  Goal: Plan of Care/Patient Progress Review  FOCUS/GOAL  Cognition/Memory/Judgment/Problem solving and Safety management    ASSESSMENT, INTERVENTIONS AND CONTINUING PLAN FOR GOAL:  Pt reported headache at start of shift when addressed pain, PRN tylenol administered per pt's preference - absence of nonverbal pain indicators. VSS. Denied SOB, denied difficulty breathing. No neuro changes upon assessment. Denied feeling depressed today. Independent in room. Writer utilizing iPad for communication - using the voice recognition to type out questions and statements. SLP also suggesting putting together common statements/questions used to easily point to for communication. Between writer's AM assessment at approx 0800 and first therapy session at 0815, pt was found wandering off unit to go through the hospital tunnel to the cafeteria to buy a bag of chips. Staff communicating with patient, pt stating \"well you were late\" for the therapy session - pt expressing bored on unit - very mobile. Writer contacted SLP to provide suggestions; sudoku at bedside, list placed at bedside and reviewed with patient per SLP of activities to do on the iPad. Wander guard applied. Encourage call light use. Family visiting in afternoon.        "

## 2018-04-15 PROCEDURE — 40000225 ZZH STATISTIC SLP WARD VISIT: Performed by: SPEECH-LANGUAGE PATHOLOGIST

## 2018-04-15 PROCEDURE — 92507 TX SP LANG VOICE COMM INDIV: CPT | Mod: GN | Performed by: SPEECH-LANGUAGE PATHOLOGIST

## 2018-04-15 PROCEDURE — 12800006 ZZH R&B REHAB

## 2018-04-15 PROCEDURE — 25000132 ZZH RX MED GY IP 250 OP 250 PS 637: Performed by: PHYSICAL MEDICINE & REHABILITATION

## 2018-04-15 PROCEDURE — 40000133 ZZH STATISTIC OT WARD VISIT

## 2018-04-15 PROCEDURE — 97535 SELF CARE MNGMENT TRAINING: CPT | Mod: GO

## 2018-04-15 RX ADMIN — ACETAMINOPHEN 975 MG: 325 TABLET ORAL at 17:49

## 2018-04-15 RX ADMIN — SENNOSIDES AND DOCUSATE SODIUM 2 TABLET: 8.6; 5 TABLET ORAL at 19:24

## 2018-04-15 RX ADMIN — ASPIRIN 81 MG CHEWABLE TABLET 81 MG: 81 TABLET CHEWABLE at 19:24

## 2018-04-15 RX ADMIN — SIMVASTATIN 20 MG: 20 TABLET, FILM COATED ORAL at 19:24

## 2018-04-15 NOTE — PLAN OF CARE
Problem: Patient Care Overview  Goal: Plan of Care/Patient Progress Review  Outcome: Improving  SLP: spouse asking for list of things spouse can/cannot do at home.  He is frequently asking why he is here, why someone will be with him at home(initially recommending 24 hour supervision, to r/o safety risks, as well as do note some reduced recall/insight).  Working on reading, understanding questions, explanations, etc, with variable success.  Using gestures , alternate wording (always in writing) helpful.

## 2018-04-15 NOTE — PROGRESS NOTES
"PM&R PROGRESS NOTE     Patient seen and examined today. He was observed in therapy session as well. Coordinated with team.      HPI/ACTIVE ISSUES   Zhen Sherman is a 57 year old male, admitted to [facility] ARU on 4/12/2018, well-known to me from initial admit.    Main issues today are as follows  He is a 57-year-old male with ischemic stroke, leading to severe receptive auditory aphasia.    Functionally, Zhen Sherman is participating in the therapies in a motivated fashion. He is making good progress.  Today he was independent with all his basic ADLs,, and mobility without an assistive device.  He has good balance. However he is severely limited by the severe auditory aphasia. He is unable to understand the reason why he is the rehab unit. He is able to read only words tammy sentence and tends to obsess over the one word. Difficulty assessing his cognition given the severe aphasia.     Encourage the primary team to coordinate with family to schedule care conference to plan discharge     Physical exam    Vital signs:  Temp: 97.8  F (36.6  C) Temp src: Oral BP: 144/67 Pulse: 69   Resp: 16 SpO2: 97 % O2 Device: None (Room air)   Height: 188 cm (6' 2\") Weight: 78.5 kg (173 lb 1.6 oz)  Estimated body mass index is 22.22 kg/(m^2) as calculated from the following:    Height as of this encounter: 1.88 m (6' 2\").    Weight as of this encounter: 78.5 kg (173 lb 1.6 oz).  Today he is convinced that he has not been seen by a doctor since coming to the ARU. Showed him the badge and discussed in vain   NIESHA NC AT [facility] EOM good   Neck supple  Heart S1S2  Lungs CTA  Abdomen  benign BS positive NT NR   LE no edema          He transfers independently  Mobility is independent without an assistive device  No loss of balance noted    Fluent speech  Able to read, and ability to understand the written material is inconsistent  Verbal comprehension is impacted severely.      REVIEWED THE MEDICATIONS BELOW   Current Facility-Administered " Medications   Medication     aspirin chewable tablet 81 mg     simvastatin (ZOCOR) tablet 20 mg     traMADol (ULTRAM) tablet 50 mg     acetaminophen (TYLENOL) tablet 325-975 mg     senna-docusate (SENOKOT-S;PERICOLACE) 8.6-50 MG per tablet 1-2 tablet     polyethylene glycol (MIRALAX/GLYCOLAX) Packet 17 g     naloxone (NARCAN) injection 0.1-0.4 mg       No results found for this or any previous visit (from the past 24 hour(s)).    Total of 25 min spent in the encounter, more than 50% in coordination and counseling on topics listed in the HPI

## 2018-04-15 NOTE — PROGRESS NOTES
Cognistat (The Neurobehavioral Cognitive Status Examination).   Completed modified version of cognistat for additional objective cognition data. Due to modifications scores not standardized.     Patient completed the Cognistat with the following scores:   Orientation:    Attention: unable to complete due to impaired language /8   Language:                         Comprehension:                         Repetition: unable to complete due to impaired language/                        Namin /8   Constructions:    Memory:   Calculations: - modified in written format    Reasoning: attempted but unable to understand concept of similarities; difficult to know if unable to complete reasoning due to cognition or simply due to language impairment with inability to comprehend instructions  Judgement: unable to complete due to impaired language/6     Summary of scores:   With further attempt to assess cognition, patient does have some underlying cognition impairments however it is very difficult to fully separate cognition and language to be able to determine level of severity of his cognition and level of impact this cognition will have on his return to function. Throughout testing patient is very defensive and lack of understanding of reasoning to test cognition skills. Patient denies having any medical change and continues to state his current employment and his college degrees as reasons for this testing to be unnecessary.    Patient also continues to state that he plans to return home and will drive his car wherever he needs to go. Patient's greatest impairment at this time is his lack of insight to his deficits and lack of understanding of current safety concerns. Patient would require 24/7 supervision as pt is likely to attempt to drive once home and based on assessment, likely patient's current executive functioning is too impaired to safely drive.

## 2018-04-15 NOTE — PLAN OF CARE
Problem: Goal/Outcome  Goal: Goal Outcome Summary  Pt a&ox4, used gestures for communication. Denied pain. Slept between cares with minimal wakening. Ind in room pt wearing wonder guard band. No concerns over night.

## 2018-04-15 NOTE — PLAN OF CARE
Problem: Patient Care Overview  Goal: Plan of Care/Patient Progress Review  FOCUS/GOAL  Discharge planning and Safety management    ASSESSMENT, INTERVENTIONS AND CONTINUING PLAN FOR GOAL:  Pt denied pain during shift. Denied SOB, denied difficulty breathing. No neuro changes upon assessment. Denies depression or feeling down - appropriate mood today. Pt aware alarm going off when by nursing station - regarding wander guard, waiting appropriately to ask for assistance to get pop at vending machine by nursing station. Gestures continued along with iPad use with voice recognition to type out words that staff are speaking. Plan to have Care Conference Monday 4/16 and possible plan to discharge 4/17 - need conversation with family member on living situation, therapy requesting to speak with family member, still recommending 24/7 supervision - continue discharge planning. Check-ins provided by staff. Continued independence in room. Continue with POC.

## 2018-04-15 NOTE — PLAN OF CARE
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 Able to make needs known with prompting utilizing written communication and note austin on Ipad (speech to text function). Pt able to verbalize needs otherwise writes them down. Pt continent of bladder using toilet. Pt stated he did feel constipated and couldn't remember his last BM. LBM: 4/12, scheduled stool softeners given. Independent in the room. Pt set off wander guard x1, was looking for an iphone . Pt able to shower independently. Pt continues to have flat affect but denied depression. Pt c/o HA tolerated with prn Tylenol x1. Pt tolerating regular/thin diet, able to take pills whole. Continue POC.

## 2018-04-15 NOTE — PLAN OF CARE
Problem: Physician Care Plan Review  Goal: Physician Review  I reviewed and agree with the plan of care  Karen Silva

## 2018-04-16 PROCEDURE — 92507 TX SP LANG VOICE COMM INDIV: CPT | Mod: GN

## 2018-04-16 PROCEDURE — 12800006 ZZH R&B REHAB

## 2018-04-16 PROCEDURE — 40000133 ZZH STATISTIC OT WARD VISIT

## 2018-04-16 PROCEDURE — 40000225 ZZH STATISTIC SLP WARD VISIT

## 2018-04-16 PROCEDURE — 25000132 ZZH RX MED GY IP 250 OP 250 PS 637: Performed by: PHYSICAL MEDICINE & REHABILITATION

## 2018-04-16 PROCEDURE — 97535 SELF CARE MNGMENT TRAINING: CPT | Mod: GO

## 2018-04-16 PROCEDURE — 97530 THERAPEUTIC ACTIVITIES: CPT | Mod: GO

## 2018-04-16 RX ADMIN — ASPIRIN 81 MG CHEWABLE TABLET 81 MG: 81 TABLET CHEWABLE at 21:20

## 2018-04-16 RX ADMIN — SENNOSIDES AND DOCUSATE SODIUM 2 TABLET: 8.6; 5 TABLET ORAL at 07:57

## 2018-04-16 RX ADMIN — TRAMADOL HYDROCHLORIDE 50 MG: 50 TABLET, COATED ORAL at 05:45

## 2018-04-16 RX ADMIN — POLYETHYLENE GLYCOL 3350 17 G: 17 POWDER, FOR SOLUTION ORAL at 07:57

## 2018-04-16 RX ADMIN — SENNOSIDES AND DOCUSATE SODIUM 1 TABLET: 8.6; 5 TABLET ORAL at 21:20

## 2018-04-16 RX ADMIN — SIMVASTATIN 20 MG: 20 TABLET, FILM COATED ORAL at 21:20

## 2018-04-16 NOTE — PROGRESS NOTES
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  Deficits with severe receptive aphasia, predominant auditory but some visual/reading.  Admitted to inpatient acute rehab 4/12/18.           Assessment and Plan of Care:   Herminio has continued to participate in occupational and speech therapies.  Wayne graduated from physical therapy doing very well in that domain.  Over the weekend Herminio, without permission, took himself all the way to the cafeteria to get a coffee and something to eat and found his way back as we were exploring looking for him.  While not we want for safety, in hindsight this does reflect decent pathfinding skills but does question some of his judgment.  Do feel he has improved his understanding at least through reading written communication.  Definitely some deficits in the receptive domain reading and more profoundly on auditory comprehension.    Conversation via writing with Herminio this morning, additional longer conversation this afternoon with his wife.  Reviewed support and some of the discharge planning.  We are on track for discharge home tomorrow.  Initially outlined 24 hour support.  We will try to have the specific written list of things that he should not do such as driving.    We will plan ongoing speech therapy for sure, also occupational therapy with some driving screen.  I would want him to have a formal behind the wheel test and further progress before we would give the go ahead to resume that task.    Zhen wife outlined identifying a neurologist through [facility] it would like to [facility]-up with.  Dr. Sebastian.  She will work on getting the specific appointment.  I will be sure to send discharge summary.  Wears the leading to identify specific location for outpatient therapies.  Options within the [facility]'s  intensive program does not have an opening until September though other standard speech therapy may well have earlier appointments.  Also consideration for [facility] if they are choosing to see the neurologist there.  His initial stroke management was at [facility] thus lots of considerations.      Medically:  Stroke risk reduction aspirin 81 mg daily  Simvastatin 20 mg daily for cholesterol (new)  Blood pressures well controlled without medication.  Herminio believes his mood is doing okay.  His wife acknowledges this as well.  He has been on fluoxetine in his past but not immediately prior to the stroke.    Continue therapies and plan of care         Review of Systems:   Somewhat limited due to communication deficits though most questions seem to be received when written down  Denies any pain.  There is some nursing report of left neck pain earlier this morning but none with my midmorning visit.    Not short of breath, no chest pain or palpitations.            Physical Exam:     Vitals:    04/15/18 0613 04/15/18 1949 04/16/18 0753 04/16/18 1030   BP: 144/67 133/74 116/69    BP Location: Left arm Left arm Left arm    Pulse: 69 64 73    Resp: 16 17 18    Temp: 97.8  F (36.6  C) 98.4  F (36.9  C) 97  F (36.1  C)    TempSrc: Oral Oral Oral    SpO2: 97% 96% 99%    Weight:    78.1 kg (172 lb 3.2 oz)   Height:         Fully alert fluent expressive language  Written questions he is able to read and for the most part with occasional re-wording or pointing out, he seems to understand the content of the question and/or answers.   Heart regular rate and rhythm  Lungs clear to auscultation.  Stable reciprocal gait pattern no loss of balance           Data:   Scheduled meds    aspirin  81 mg Oral Daily     simvastatin  20 mg Oral QPM     senna-docusate  1-2 tablet Oral BID     polyethylene glycol  17 g Oral Daily       PRN meds:  acetaminophen       Coordination of care:  15 minutes  Face-to-face:              55 minutes  including conversations with Herminio and subsequently with his wife.  Total time:                  70 minutes

## 2018-04-16 NOTE — PLAN OF CARE
Problem: Goal/Outcome  Goal: Goal Outcome Summary  Outcome: No Change  FOCUS/GOAL  Medical management, Discharge planning and Mobility    ASSESSMENT, INTERVENTIONS AND CONTINUING PLAN FOR GOAL:  Pt's vitals stable. Pt has expressive and receptive aphasia. Sometimes requires repetition to understand speaker. Pt able to write to communicate his needs.  Independent in room mobility without any AD. Pt managing cares and toileting independently. Continent of bowel and bladder. BM today per pt. Plan is to discharge tomorrow. Supposed to have a PLC Stroke Class today but wife cancelled. Will give pt Stroke Book.

## 2018-04-16 NOTE — PLAN OF CARE
Patient's wife had 12:30 4/16/18 PLC appointment.Not able to make it and not ready for appt.yet.Unit will put in order when ready.

## 2018-04-16 NOTE — PLAN OF CARE
Problem: Goal/Outcome  Goal: Goal Outcome Summary  Outcome: No Change  FOCUS/GOAL  Bowel management and Pain management    ASSESSMENT, INTERVENTIONS AND CONTINUING PLAN FOR GOAL:  Pt is independent with functional mobility without AD. Has severe receptive aphasia. Used gestures and writing simple words to communicate with patient. When I pointed his wrist band before giving medication, pt stated his  but when asked about LBM and having constipation, pt unable to answer exactly just saying okay. LBM /12 per record. Scheduled bowel med given.   Tylenol given x1 for headache per request.   Wears wander guard for safety concern, no issues so far. Stroke class is scheduled tomorrow at 9:30 am.

## 2018-04-16 NOTE — CONSULTS
PLC Stroke ed appointment for 4/16/18 12:30 PM was cancelled per patient's wife.She is not able to attend and states she is not ready for education yet.Wife states she is focused on other areas of care right now.She will work with 5 AR staff to reschedule.Nurse notified.

## 2018-04-16 NOTE — PLAN OF CARE
Problem: Goal/Outcome  Goal: Goal Outcome Summary  Outcome: No Change  Patient sleeping most of this shift, awake with pain in left side of neck, neck is tender to touch, no swollen noted and patient able to swallow easily.  Patient requested for and received prn Tramadol with good relief. He is up ambulating on Unit independently without assistive device. Continue current plan of care

## 2018-04-16 NOTE — PLAN OF CARE
"Problem: Patient Care Overview  Goal: Plan of Care/Patient Progress Review    Reviewed completed homework assignments.  Pt had difficulty understanding 2 part instructions during task. Pt clearly recognized words/ sounds at least 2 times during session. When clinician misheard a word \"[place]\" as \"e[place]\" pt corrected clinician without any writtin input.  Able to understand simple, short, written sentences, needed support for reading that is beyond simple instructions/questions. Expanded on list of do's/don'ts for return to home. Unclear if pt fully understands the purpose of the list.  Pt reported feeling tired in session, likely impacting performance. Clinician in communication with various outpatient treatment options and information shared with patient's spouse. Will make final decision on location tomorrow.   "

## 2018-04-17 ENCOUNTER — RECORDS - HEALTHEAST (OUTPATIENT)
Dept: ADMINISTRATIVE | Facility: OTHER | Age: 58
End: 2018-04-17

## 2018-04-17 VITALS
OXYGEN SATURATION: 100 % | TEMPERATURE: 95.8 F | SYSTOLIC BLOOD PRESSURE: 121 MMHG | RESPIRATION RATE: 12 BRPM | WEIGHT: 172.2 LBS | BODY MASS INDEX: 22.1 KG/M2 | HEART RATE: 66 BPM | DIASTOLIC BLOOD PRESSURE: 70 MMHG | HEIGHT: 74 IN

## 2018-04-17 PROCEDURE — 40000225 ZZH STATISTIC SLP WARD VISIT

## 2018-04-17 PROCEDURE — 40000133 ZZH STATISTIC OT WARD VISIT

## 2018-04-17 PROCEDURE — 25000132 ZZH RX MED GY IP 250 OP 250 PS 637: Performed by: PHYSICAL MEDICINE & REHABILITATION

## 2018-04-17 PROCEDURE — 97530 THERAPEUTIC ACTIVITIES: CPT | Mod: GO

## 2018-04-17 PROCEDURE — 92507 TX SP LANG VOICE COMM INDIV: CPT | Mod: GN

## 2018-04-17 RX ORDER — ASPIRIN 81 MG/1
81 TABLET, CHEWABLE ORAL DAILY
COMMUNITY
Start: 2018-04-17 | End: 2021-09-14

## 2018-04-17 RX ORDER — AMOXICILLIN 250 MG
1-2 CAPSULE ORAL 2 TIMES DAILY PRN
COMMUNITY
Start: 2018-04-17 | End: 2021-09-14

## 2018-04-17 RX ORDER — SIMVASTATIN 20 MG
20 TABLET ORAL EVERY EVENING
Qty: 30 TABLET | Refills: 0 | Status: SHIPPED | OUTPATIENT
Start: 2018-04-17 | End: 2021-09-14

## 2018-04-17 RX ADMIN — SENNOSIDES AND DOCUSATE SODIUM 1 TABLET: 8.6; 5 TABLET ORAL at 07:55

## 2018-04-17 RX ADMIN — POLYETHYLENE GLYCOL 3350 17 G: 17 POWDER, FOR SOLUTION ORAL at 07:55

## 2018-04-17 RX ADMIN — ACETAMINOPHEN 650 MG: 325 TABLET ORAL at 08:00

## 2018-04-17 NOTE — PLAN OF CARE
Problem: Goal/Outcome  Goal: Goal Outcome Summary  Patient discharged home today at 1230. Discharge instructions reviewed with patient and family. Copy of discharge instructions/ med filled by dc pharmacy given to pt. Questions answered. Family provided transportation.

## 2018-04-17 NOTE — PLAN OF CARE
Problem: Patient Care Overview  Goal: Plan of Care/Patient Progress Review  OT: Spoke with pt's wife about plan for driving assessment at [facility]; provided all resources for setting up assessment.   Spoke with [facility] directly over the phone for verbal update on patient with plan to set up assessment as soon as possible.

## 2018-04-17 NOTE — PLAN OF CARE
"Problem: Patient Care Overview  Goal: Plan of Care/Patient Progress Review  Speech Language Therapy Discharge Summary    Reason for therapy discharge:    Discharged to home with outpatient therapy.    Progress towards therapy goal(s). See goals on Care Plan in [facility] electronic health record for goal details.  Goals not met.  Barriers to achieving goals:   discharge from facility.    Therapy recommendation(s):    Continued therapy is recommended.  Rationale/Recommendations:  Address language impairments.     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  Presentation is atypical from normal pure word deafness in that environmental sounds are recognized in less than half of opportunities, also reading comprehension is impaired being limited to simple and short sentence length information.  Family and friends are aware of impairments and interacting appropriately with patient.  Patient is unable to understand any verbal input.  Primary methods of comprehension are through a combination of writing at short phrase and sentence level as well as augmentation through gestures and pictures.  Patient demonstrating limited insight into his impairments as well as unaccepting of the idea that he had a stroke as well as need for driving assessment prior to resuming driving.  To aid in success in outpatient therapy, clinician will be in contact with family to share diagnosis information with outpatient clinic.  While deficits are not a true \"aphasia\" in outpatient clinic with experience addressing aphasia along with the knowledge of the actual diagnosis would be successful in working with patient.  Family is currently still deciding on which location to go to for the outpatient therapy.  Initial treatment tasks may include simplified auditory discrimination tasks and building upon success.  Occasionally during sessions, patient has reacted to verbally presented information before being " able to see the written presentation.  For further questions, contact speech pathologist Chandler Adams at [phone].

## 2018-04-17 NOTE — PLAN OF CARE
FOCUS/GOAL  Bowel management, Bladder management, Discharge planning, Mobility and Cognition/Memory/Judgment/Problem solving    ASSESSMENT, INTERVENTIONS AND CONTINUING PLAN FOR GOAL:  Independent in room without AD , he wears a wanderguard , he ambulated in the hallway went to the [facility] and whenhe passed the front door he sets off the alarm , pt was redirected back to his room , voiding spontaneously in the toilet had aBM in AM   Tylenol 650 mg po was given for pain on the left side of the neck with relief , pt was able to directions from staff , he also checked what he want for breakfast and afte he filled it out that is what he wants for his breakfast  And it was given to night shift staff .  D/C plan to home Tues 4/15 discharge orders are still to be done tomorrow will continue to promote safety and continue to give time for pt to communicate needs .  Did his shower in the shower room with set up and supervision .

## 2018-04-17 NOTE — PLAN OF CARE
Problem: Patient Care Overview  Goal: Plan of Care/Patient Progress Review  Occupational Therapy Discharge Summary    Reason for therapy discharge:    Discharged to home with outpatient therapy.    Progress towards therapy goal(s). See goals on Care Plan in [facility] electronic health record for goal details.  Goals met    Therapy recommendation(s):    Would recommend ongoing OP OT and additional driving assessment in order to safely assessment level of independence with driving.     Patient with diagnosis of auditory agnosia or pure word deafness. Patient is able to verbalize but it unable to comprehend auditory output including voices or most environmental sounds. Patient is able to comprehend ~50-60% of written words, with decreased understanding and the complexity of the instructions or information becomes.   Patient's insight and understand of his current deficits is poor and pt only independently initiates compensatory strategies approx. 50% of the time but unable to provide reason for use of compensatory strategies.     With further attempt to assess cognition, patient does have some underlying cognition impairments in area of executive functioning however it is very difficult to fully separate cognition and language to be able to determine level of severity of his cognition and level of impact this cognition will have on his return to function. Throughout testing patient is very defensive and lack of understanding of reasoning to test cognition skills. Patient denies having any medical change and continues to state his current employment and his college degrees as reasons for this testing to be unnecessary.    Patient also continues to state that he plans to return home and will drive his car wherever he needs to go. Patient's greatest impairment at this time is his lack of insight to his deficits and lack of understanding of current safety concerns. Patient would require 24/7 supervision as pt is likely to  attempt to drive once home and based on assessment, likely patient's current executive functioning is too impaired to safely drive.

## 2018-04-17 NOTE — DISCHARGE INSTRUCTIONS
"Follow up appointments:    -- Primary care within 1-2 weeks.  You are scheduled to see Dr. Huy Bruce on Wednesday, April 25th at 10:50. Please arrive 15 minutes early (10:35 am) to check in.    Address  [facility]                          [address], [place]   Phone   [phone]    -- Neurology in 3-4 weeks. Patient / wife outlined wanting to see Dr. Naida Stovall through [facility]. Family will schedule this appointment.  Address  [facility] - Neurology                          [address] [place] [address]  Phone   [phone]  Or  Address  [facility]                          [address] [place] [address]  Phone   [phone]    -- You can follow up with Dr. Reggie Hilton on an as needed basis if any functional or rehab issues persist not otherwise covered by your therapists and neurologist.  Call if you would like to schedule.  Address  [facility]                          Physical Medicine and Rehabilitation Clinic    [address] [place] [address]; [address]    [place] [address]    Phone   [phone]    -----------------------------------------------  To reduce the risk of subsequent stroke there are several important factors including optimal management of anticoagulants, blood pressure, cholesterol, diabetes and smoking abstinence.    Anticoagulation:  You are on Aspirin    Blood Pressure:  Keeping your blood pressures less than 130/80 has been shown to reduce risk of recurrent stroke.   You are currently not requiring any medication.     Diabetes:  You do not have diabetes though it is important to continue monitoring for this in the future with your primary provider.    Cholesterol:  Traditional target levels for LDL cholesterol or \"bad cholesterol\" is less than 130 however " "once you have had a stroke, your target LDL level is now less than 70. Additional recommendations such as increasing your HDL or \"good\" cholesterol and lowering your triglyceride level can also be important.    Your most recent lipid panel was   Total Cholesterol 138   Triglycerides 86   HDL Cholesterol 40   LDL Calculated 81     You were started on simvastatin to further reduce cholesterol and reduce risk for stroke.    This should be followed up in 2-3 months with your primary provider.    Smoking:  Do not start smoking now Herminio!  finally one of the most important modifiable risk factors is to not smoke. This includes cigarettes, pipes, cigars, chewing tobacco and second hand smoke. Support through counseling, nicotine replacement, and oral smoking-cessation medications may all be helpful. Often people have been able to quit during their hospitalization but once returning to their familiar environment, the urges can be stronger. If this is the case, we encourage you to get support. There are numerous options, start by talking with your doctor.    "

## 2018-04-18 NOTE — DISCHARGE SUMMARY
[facility]   Discharge Summary     Date Admitted: 4/12/2018  Date Discharge: 4/17/2018    Discharge Disposition: Home with supervision from wife and other friends/family    Discharge Diagnosis:   1. Ischemic stroke left hemisphere secondary to internal carotid artery dissection  2. Significant receptive aphasia, pure word deafness  3. Hyperlipidemia  4. High functioning autism spectrum    Brief History of Presenting Illness and Hospital Course:  57-year-old with left 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  Deficits with severe receptive aphasia, predominant auditory but some visual/reading.  Admitted to inpatient acute rehab 4/12/18.    Herminio participated in physical occupational and speech therapies though PT dropped off quickly after first day evaluation was at high level without any deficits identified.  His predominant deficit remains in the language domain cascading into some of his activities of daily living.  He still has little to no comprehension of spoken words.  He is able to understand written communication fairly well but still needs to be at shorter sentence levels.  There are cognitive domain deficits as well and he does lack some insight into the extent of his deficits.  Because of this we are recommending initial 24 hour supervision.  Psychosocially Herminio was in process of divorce with his wife, had been  for 9 months though she is having him come back to stay at her home initially for the supervision.  She is reaching out to other coworkers friends and family to augment.  Herminio is very particular and some of his mannerisms consistent with his high functioning autism.  He has close coworkers at the company he has been at for 30 + years.    Medically no issues came up during his rehab stay. Stroke risk reduction aspirin 81 mg daily. Simvastatin 20 mg daily for cholesterol (new)  Blood  "pressures well controlled without medication.  Herminio believes his mood is doing okay.  His wife acknowledges this as well.  He has been on fluoxetine in his past but not immediately prior to the stroke.  Continue to monitor.    Discharge Medications:   Zhen Sherman   Home Medication Instructions YASIR:[id number]    Printed on:04/17/18 1935   Medication Information                      aspirin 81 MG chewable tablet  Take 1 tablet (81 mg) by mouth daily             senna-docusate (SENOKOT-S;PERICOLACE) 8.6-50 MG per tablet  Take 1-2 tablets by mouth 2 times daily as needed for constipation             simvastatin (ZOCOR) 20 MG tablet  Take 1 tablet (20 mg) by mouth every evening               Physical Examination:   /70 (BP Location: Left arm)  Pulse 66  Temp 95.8  F (35.4  C) (Oral)  Resp 12  Ht 1.88 m (6' 2\")  Wt 78.1 kg (172 lb 3.2 oz)  SpO2 100%  BMI 22.11 kg/m2  Fully alert, pleasant.  Communication receptively by him reading written sentences.  He will sometimes right back in response but can also vocalize fairly well.    Heart regular rate and rhythm lungs clear.  Strong symmetric bilateral 5/5 strength and coordination normal    Discharge Instructions:    Active Diet Order      Diet regular consistency thin liquids  Activity: Okay up independent within closed environments, 24 hour supervision until communication and safety in different situations better established.  Someone to be with him for all community settings.  Definitely no driving until further screening and likely formal behind the wheel assessment.    Follow up Appointments:  Herminio has received care in several systems, primary provider Dr. Bruce with [facility], initial hospitalization at [facility] transferred to [facility] acute rehab center.  They are choosing to follow-up with a neurologist through [facility] Dr. Sebastian.  Outpatient therapies choosing to do through [facility].    70 " minutes spent on discharge, 40 minutes in direct patient and wife interaction, remainder in coordination of care.

## 2018-04-24 ENCOUNTER — TELEPHONE (OUTPATIENT)
Dept: NEUROLOGY | Facility: CLINIC | Age: 58
End: 2018-04-24

## 2018-04-24 ENCOUNTER — AMBULATORY - HEALTHEAST (OUTPATIENT)
Dept: PALLIATIVE MEDICINE | Facility: OTHER | Age: 58
End: 2018-04-24

## 2018-04-24 ENCOUNTER — CARE COORDINATION (OUTPATIENT)
Dept: PHYSICAL MEDICINE AND REHAB | Facility: CLINIC | Age: 58
End: 2018-04-24

## 2018-04-24 DIAGNOSIS — I69.320 APHASIA AS LATE EFFECT OF STROKE: Primary | ICD-10-CM

## 2018-04-24 DIAGNOSIS — I69.320 APHASIA AS LATE EFFECT OF STROKE: ICD-10-CM

## 2018-04-24 NOTE — TELEPHONE ENCOUNTER
[facility] Call Center    Phone Message    May a detailed message be left on voicemail: yes    Reason for Call: Other: Pt Wife Shanique calling in, Please call her back at [phone]. She wants to know if we do Hyperbaric Oxygen Therapy? Pt  was seen at [facility] for a Stroke. Records in [facility] from  Acute Rehab Center. Also sees Speech Therapist at [facility]. Pt wants to have this procedure of extra oxygen to help repair the brain that was damaged. Someone at [facility] told her we may have this here so they don't have to go to [place]. Please call her back to discuss either way. Thanks!      Action Taken: Message routed to:  [facility] ([facility] Neurology

## 2018-04-24 NOTE — PROGRESS NOTES
RX from Dr Hilton for Acupuncture was faxed to Mary Driscoll at [facility] [phone].

## 2018-04-25 NOTE — TELEPHONE ENCOUNTER
Per Dr. Weller - No hyperbaric therapy at [facility] to his knowledge. Referred patient to [facility].

## 2018-05-08 ENCOUNTER — OFFICE VISIT - HEALTHEAST (OUTPATIENT)
Dept: FAMILY MEDICINE | Facility: CLINIC | Age: 58
End: 2018-05-08

## 2018-05-08 DIAGNOSIS — I69.320 APHASIA DUE TO RECENT STROKE: ICD-10-CM

## 2018-05-08 DIAGNOSIS — I63.512 ACUTE ISCHEMIC LEFT MCA STROKE (H): ICD-10-CM

## 2018-05-11 NOTE — PROGRESS NOTES
IRF-[facility] CLARIFICATION NOTE FOR DISCHARGE  Lowest score for each FIM item supported by available charting  Discharge FIM scores taken from charting on 4/17/18.    Eating: FIM 7. Patient ate a regular consistency diet independently.  Bladder:  FIM 7. Continent of bladder on the toilet.  Bladder Number of Accidents: 0  Bowel: FIM 7. Continent of bowel on the toilet.  Bowel Number of Accidents: 0  Social Interaction: FIM 5. Patient needs supervision level assist for social interaction. Does not recognize social cues. History of Asperger syndrome.  Problem solving: FIM 5. Requires supervision level assist for problem solving basic problems about 10% of the time.  Memory: FIM 6. Patient has only slight difficulty recalling names and routines.

## 2018-05-29 ENCOUNTER — OFFICE VISIT - HEALTHEAST (OUTPATIENT)
Dept: FAMILY MEDICINE | Facility: CLINIC | Age: 58
End: 2018-05-29

## 2018-05-29 DIAGNOSIS — Z12.11 SCREEN FOR COLON CANCER: ICD-10-CM

## 2018-05-29 DIAGNOSIS — I63.512 ACUTE ISCHEMIC LEFT MCA STROKE (H): ICD-10-CM

## 2018-05-29 DIAGNOSIS — E78.5 HYPERLIPIDEMIA: ICD-10-CM

## 2018-05-29 DIAGNOSIS — F32.A DEPRESSION: ICD-10-CM

## 2018-05-29 LAB
ALT SERPL W P-5'-P-CCNC: 30 U/L (ref 0–45)
AST SERPL W P-5'-P-CCNC: 34 U/L (ref 0–40)
CHOLEST SERPL-MCNC: 137 MG/DL
FASTING STATUS PATIENT QL REPORTED: NO
HDLC SERPL-MCNC: 58 MG/DL
LDLC SERPL CALC-MCNC: 67 MG/DL
TRIGL SERPL-MCNC: 58 MG/DL

## 2018-05-30 ENCOUNTER — COMMUNICATION - HEALTHEAST (OUTPATIENT)
Dept: FAMILY MEDICINE | Facility: CLINIC | Age: 58
End: 2018-05-30

## 2018-05-31 ENCOUNTER — COMMUNICATION - HEALTHEAST (OUTPATIENT)
Dept: SCHEDULING | Facility: CLINIC | Age: 58
End: 2018-05-31

## 2018-05-31 ENCOUNTER — AMBULATORY - HEALTHEAST (OUTPATIENT)
Dept: FAMILY MEDICINE | Facility: CLINIC | Age: 58
End: 2018-05-31

## 2018-05-31 DIAGNOSIS — R45.1 AGITATION: ICD-10-CM

## 2018-05-31 DIAGNOSIS — I63.9 STROKE (H): ICD-10-CM

## 2018-05-31 DIAGNOSIS — H93.25: ICD-10-CM

## 2018-06-08 ENCOUNTER — COMMUNICATION - HEALTHEAST (OUTPATIENT)
Dept: FAMILY MEDICINE | Facility: CLINIC | Age: 58
End: 2018-06-08

## 2018-06-08 ENCOUNTER — OFFICE VISIT - HEALTHEAST (OUTPATIENT)
Dept: FAMILY MEDICINE | Facility: CLINIC | Age: 58
End: 2018-06-08

## 2018-06-08 DIAGNOSIS — F32.A DEPRESSION: ICD-10-CM

## 2018-07-10 ENCOUNTER — COMMUNICATION - HEALTHEAST (OUTPATIENT)
Dept: SCHEDULING | Facility: CLINIC | Age: 58
End: 2018-07-10

## 2018-07-10 ENCOUNTER — COMMUNICATION - HEALTHEAST (OUTPATIENT)
Dept: FAMILY MEDICINE | Facility: CLINIC | Age: 58
End: 2018-07-10

## 2018-07-26 ENCOUNTER — COMMUNICATION - HEALTHEAST (OUTPATIENT)
Dept: FAMILY MEDICINE | Facility: CLINIC | Age: 58
End: 2018-07-26

## 2018-08-07 ENCOUNTER — OFFICE VISIT - HEALTHEAST (OUTPATIENT)
Dept: FAMILY MEDICINE | Facility: CLINIC | Age: 58
End: 2018-08-07

## 2018-08-07 ENCOUNTER — COMMUNICATION - HEALTHEAST (OUTPATIENT)
Dept: SCHEDULING | Facility: CLINIC | Age: 58
End: 2018-08-07

## 2018-08-07 DIAGNOSIS — F33.9 MAJOR DEPRESSION, RECURRENT (H): ICD-10-CM

## 2018-08-07 DIAGNOSIS — F41.9 ANXIETY: ICD-10-CM

## 2018-08-07 DIAGNOSIS — Z86.73 HISTORY OF STROKE: ICD-10-CM

## 2018-08-07 ASSESSMENT — MIFFLIN-ST. JEOR: SCORE: 1651.34

## 2018-08-09 ENCOUNTER — COMMUNICATION - HEALTHEAST (OUTPATIENT)
Dept: FAMILY MEDICINE | Facility: CLINIC | Age: 58
End: 2018-08-09

## 2018-09-10 ENCOUNTER — RECORDS - HEALTHEAST (OUTPATIENT)
Dept: ADMINISTRATIVE | Facility: OTHER | Age: 58
End: 2018-09-10

## 2018-09-13 ENCOUNTER — COMMUNICATION - HEALTHEAST (OUTPATIENT)
Dept: FAMILY MEDICINE | Facility: CLINIC | Age: 58
End: 2018-09-13

## 2018-10-08 ENCOUNTER — RECORDS - HEALTHEAST (OUTPATIENT)
Dept: ADMINISTRATIVE | Facility: OTHER | Age: 58
End: 2018-10-08

## 2018-10-09 ENCOUNTER — OFFICE VISIT - HEALTHEAST (OUTPATIENT)
Dept: FAMILY MEDICINE | Facility: CLINIC | Age: 58
End: 2018-10-09

## 2018-10-09 DIAGNOSIS — Z86.73 HISTORY OF TIA (TRANSIENT ISCHEMIC ATTACK) AND STROKE: ICD-10-CM

## 2018-10-09 DIAGNOSIS — F33.1 MODERATE EPISODE OF RECURRENT MAJOR DEPRESSIVE DISORDER (H): ICD-10-CM

## 2018-10-11 ENCOUNTER — COMMUNICATION - HEALTHEAST (OUTPATIENT)
Dept: FAMILY MEDICINE | Facility: CLINIC | Age: 58
End: 2018-10-11

## 2018-10-17 ENCOUNTER — COMMUNICATION - HEALTHEAST (OUTPATIENT)
Dept: FAMILY MEDICINE | Facility: CLINIC | Age: 58
End: 2018-10-17

## 2018-10-30 ENCOUNTER — COMMUNICATION - HEALTHEAST (OUTPATIENT)
Dept: FAMILY MEDICINE | Facility: CLINIC | Age: 58
End: 2018-10-30

## 2018-11-08 ENCOUNTER — COMMUNICATION - HEALTHEAST (OUTPATIENT)
Dept: FAMILY MEDICINE | Facility: CLINIC | Age: 58
End: 2018-11-08

## 2018-11-12 ENCOUNTER — RECORDS - HEALTHEAST (OUTPATIENT)
Dept: ADMINISTRATIVE | Facility: OTHER | Age: 58
End: 2018-11-12

## 2018-12-05 ENCOUNTER — COMMUNICATION - HEALTHEAST (OUTPATIENT)
Dept: FAMILY MEDICINE | Facility: CLINIC | Age: 58
End: 2018-12-05

## 2019-02-11 ENCOUNTER — COMMUNICATION - HEALTHEAST (OUTPATIENT)
Dept: FAMILY MEDICINE | Facility: CLINIC | Age: 59
End: 2019-02-11

## 2019-02-11 DIAGNOSIS — F33.1 MODERATE EPISODE OF RECURRENT MAJOR DEPRESSIVE DISORDER (H): ICD-10-CM

## 2019-03-20 ENCOUNTER — OFFICE VISIT - HEALTHEAST (OUTPATIENT)
Dept: FAMILY MEDICINE | Facility: CLINIC | Age: 59
End: 2019-03-20

## 2019-03-20 DIAGNOSIS — G47.00 INSOMNIA, UNSPECIFIED TYPE: ICD-10-CM

## 2019-03-20 DIAGNOSIS — F33.1 MODERATE EPISODE OF RECURRENT MAJOR DEPRESSIVE DISORDER (H): ICD-10-CM

## 2019-03-20 DIAGNOSIS — I63.512 ACUTE ISCHEMIC LEFT MCA STROKE (H): ICD-10-CM

## 2019-05-22 ENCOUNTER — RECORDS - HEALTHEAST (OUTPATIENT)
Dept: ADMINISTRATIVE | Facility: OTHER | Age: 59
End: 2019-05-22

## 2019-05-31 ENCOUNTER — RECORDS - HEALTHEAST (OUTPATIENT)
Dept: ADMINISTRATIVE | Facility: OTHER | Age: 59
End: 2019-05-31

## 2019-06-13 ENCOUNTER — RECORDS - HEALTHEAST (OUTPATIENT)
Dept: ADMINISTRATIVE | Facility: OTHER | Age: 59
End: 2019-06-13

## 2019-06-27 ENCOUNTER — COMMUNICATION - HEALTHEAST (OUTPATIENT)
Dept: FAMILY MEDICINE | Facility: CLINIC | Age: 59
End: 2019-06-27

## 2019-06-27 DIAGNOSIS — G47.00 INSOMNIA, UNSPECIFIED TYPE: ICD-10-CM

## 2019-09-04 NOTE — PROGRESS NOTES
"PM&R PROGRESS NOTE     Patient seen and examined today. He was observed in therapy session as well. Coordinated with team.      HPI/ACTIVE ISSUES   Zhen Sherman is a 57 year old male, admitted to [facility] ARU on 4/12/2018, well-known to me from initial admit.    Main issues today are as follows  He is a 57-year-old male with ischemic stroke, leading to severe receptive auditory aphasia.    Functionally, Zhen Sherman is participating in the therapies in a motivated fashion. He is making good progress.  Today he was independent with all his basic ADLs,, and mobility without an assistive device.  He has good balance.    Given the good function, mobility he has been a wander risk.  He has been trying to get out of the unit, and was brought back this morning.  We have placed all wander risk strategies in place including alarms.  Several strategies are being utilized to help him understand the situation.  He will need speech speech-language pathology long-term as an outpatient basis.  He continues to be frustrated, other times with the flat affect.  Coordinated with nursing and physical therapy, to place the strategies for the safety of the patient  Projected length of stay is much shorter than initially anticipated given his overall ability to mobilize and transfer.  Team to work with wife to educate and provide training given his specific deficits.        Physical exam    Vital signs:  Temp: 97.9  F (36.6  C) Temp src: Oral BP: 121/76 Pulse: 68   Resp: 16 SpO2: 97 % O2 Device: None (Room air)   Height: 188 cm (6' 2\") Weight: 78.5 kg (173 lb 1.6 oz)  Estimated body mass index is 22.22 kg/(m^2) as calculated from the following:    Height as of this encounter: 1.88 m (6' 2\").    Weight as of this encounter: 78.5 kg (173 lb 1.6 oz).  HEENT NC AT PRTL EOM good   Neck supple  Heart S1S2  Lungs CTA  Abdomen  benign BS positive NT NR   LE no edema          He transfers independently  Mobility is independent without an " assistive device  No loss of balance noted    Fluent speech  Able to read, and ability to understand the written material is inconsistent  Verbal comprehension is impacted severely.      REVIEWED THE MEDICATIONS BELOW   Current Facility-Administered Medications   Medication     aspirin chewable tablet 81 mg     simvastatin (ZOCOR) tablet 20 mg     traMADol (ULTRAM) tablet 50 mg     acetaminophen (TYLENOL) tablet 325-975 mg     senna-docusate (SENOKOT-S;PERICOLACE) 8.6-50 MG per tablet 1-2 tablet     polyethylene glycol (MIRALAX/GLYCOLAX) Packet 17 g     naloxone (NARCAN) injection 0.1-0.4 mg       No results found for this or any previous visit (from the past 24 hour(s)).    Total of 25 min spent in the encounter, more than 50% in coordination and counseling on topics listed in the HPI      Xeljanz Counseling: I discussed with the patient the risks of Xeljanz therapy including increased risk of infection, liver issues, headache, diarrhea, or cold symptoms. Live vaccines should be avoided. They were instructed to call if they have any problems.

## 2020-01-13 ENCOUNTER — COMMUNICATION - HEALTHEAST (OUTPATIENT)
Dept: FAMILY MEDICINE | Facility: CLINIC | Age: 60
End: 2020-01-13

## 2020-01-13 DIAGNOSIS — F33.1 MODERATE EPISODE OF RECURRENT MAJOR DEPRESSIVE DISORDER (H): ICD-10-CM

## 2020-06-09 ENCOUNTER — COMMUNICATION - HEALTHEAST (OUTPATIENT)
Dept: FAMILY MEDICINE | Facility: CLINIC | Age: 60
End: 2020-06-09

## 2020-06-09 DIAGNOSIS — G47.00 INSOMNIA, UNSPECIFIED TYPE: ICD-10-CM

## 2021-02-12 ENCOUNTER — COMMUNICATION - HEALTHEAST (OUTPATIENT)
Dept: FAMILY MEDICINE | Facility: CLINIC | Age: 61
End: 2021-02-12

## 2021-02-12 DIAGNOSIS — F33.1 MODERATE EPISODE OF RECURRENT MAJOR DEPRESSIVE DISORDER (H): ICD-10-CM

## 2021-04-19 ENCOUNTER — COMMUNICATION - HEALTHEAST (OUTPATIENT)
Dept: FAMILY MEDICINE | Facility: CLINIC | Age: 61
End: 2021-04-19

## 2021-04-19 DIAGNOSIS — F33.1 MODERATE EPISODE OF RECURRENT MAJOR DEPRESSIVE DISORDER (H): ICD-10-CM

## 2021-04-20 ENCOUNTER — AMBULATORY - HEALTHEAST (OUTPATIENT)
Dept: NURSING | Facility: CLINIC | Age: 61
End: 2021-04-20

## 2021-05-11 ENCOUNTER — AMBULATORY - HEALTHEAST (OUTPATIENT)
Dept: NURSING | Facility: CLINIC | Age: 61
End: 2021-05-11

## 2021-05-14 ENCOUNTER — RECORDS - HEALTHEAST (OUTPATIENT)
Dept: ADMINISTRATIVE | Facility: OTHER | Age: 61
End: 2021-05-14

## 2021-05-25 ENCOUNTER — RECORDS - HEALTHEAST (OUTPATIENT)
Dept: ADMINISTRATIVE | Facility: OTHER | Age: 61
End: 2021-05-25

## 2021-05-30 ENCOUNTER — RECORDS - HEALTHEAST (OUTPATIENT)
Dept: ADMINISTRATIVE | Facility: CLINIC | Age: 61
End: 2021-05-30

## 2021-05-30 VITALS — BODY MASS INDEX: 23.13 KG/M2 | HEIGHT: 74 IN | WEIGHT: 180.2 LBS

## 2021-05-30 VITALS — WEIGHT: 177 LBS | BODY MASS INDEX: 22.73 KG/M2

## 2021-05-30 NOTE — TELEPHONE ENCOUNTER
Refill Approved    Rx renewed per Medication Renewal Policy. Medication was last renewed on 10/9/18.    Ruma Carpenter, [facility] Connection Triage/Med Refill 6/27/2019     Requested Prescriptions   Pending Prescriptions Disp Refills     traZODone (DESYREL) 50 MG tablet [Pharmacy Med Name: TRAZODONE 50MG TABLETS] 135 tablet 0     Sig: TAKE 1 1/2 TABLETS BY MOUTH EVERY NIGHT AT BEDTIME       Tricyclics/Misc Antidepressant/Antianxiety Meds Refill Protocol Passed - 6/27/2019  7:59 AM        Passed - PCP or prescribing provider visit in last year     Last office visit with prescriber/PCP: 3/20/2019 Huy Bruce MD OR same dept: 3/20/2019 Huy Bruce MD OR same specialty: 3/20/2019 Huy Bruce MD  Last physical: Visit date not found Last MTM visit: Visit date not found   Next visit within 3 mo: Visit date not found  Next physical within 3 mo: Visit date not found  Prescriber OR PCP: Huy Bruce MD  Last diagnosis associated with med order: There are no diagnoses linked to this encounter.  If protocol passes may refill for 12 months if within 3 months of last provider visit (or a total of 15 months).

## 2021-05-31 VITALS — WEIGHT: 172.19 LBS | BODY MASS INDEX: 22.1 KG/M2 | HEIGHT: 74 IN

## 2021-06-01 VITALS — WEIGHT: 173.38 LBS | BODY MASS INDEX: 22.26 KG/M2

## 2021-06-01 VITALS — WEIGHT: 164.6 LBS | BODY MASS INDEX: 20.57 KG/M2

## 2021-06-01 VITALS — HEIGHT: 75 IN | BODY MASS INDEX: 21.73 KG/M2 | WEIGHT: 174.8 LBS

## 2021-06-01 VITALS — WEIGHT: 165.56 LBS | BODY MASS INDEX: 21.26 KG/M2

## 2021-06-01 VITALS — WEIGHT: 165.3 LBS | BODY MASS INDEX: 21.22 KG/M2

## 2021-06-01 VITALS — WEIGHT: 164.4 LBS | BODY MASS INDEX: 20.44 KG/M2 | HEIGHT: 75 IN

## 2021-06-02 VITALS — BODY MASS INDEX: 20.32 KG/M2 | WEIGHT: 162.6 LBS

## 2021-06-05 NOTE — TELEPHONE ENCOUNTER
Refill Approved    Rx renewed per Medication Renewal Policy. Medication was last renewed on 2/12/19.    Ruma Carpenter, [facility] Connection Triage/Med Refill 1/15/2020     Requested Prescriptions   Pending Prescriptions Disp Refills     PARoxetine (PAXIL) 20 MG tablet [Pharmacy Med Name: PAROXETINE 20MG TABLETS] 30 tablet 6     Sig: TAKE 1 TABLET BY MOUTH EVERY MORNING       [facility] Refill Protocol  Passed - 1/13/2020  1:09 PM        Passed - PCP or prescribing provider visit in last year     Last office visit with prescriber/PCP: 3/20/2019 Huy Bruce MD OR same dept: 3/20/2019 Huy Bruce MD OR same specialty: 3/20/2019 Huy Bruce MD  Last physical: Visit date not found Last MTM visit: Visit date not found   Next visit within 3 mo: Visit date not found  Next physical within 3 mo: Visit date not found  Prescriber OR PCP: Huy Bruce MD  Last diagnosis associated with med order: 1. Moderate episode of recurrent major depressive disorder (H)  - PARoxetine (PAXIL) 20 MG tablet [Pharmacy Med Name: PAROXETINE 20MG TABLETS]; TAKE 1 TABLET BY MOUTH EVERY MORNING  Dispense: 30 tablet; Refill: 6    If protocol passes may refill for 12 months if within 3 months of last provider visit (or a total of 15 months).

## 2021-06-08 NOTE — TELEPHONE ENCOUNTER
RN cannot approve Refill Request    RN can NOT refill this medication PCP messaged that patient is overdue for Office Visit. Last office visit: 3/20/2019 Huy Bruce MD Last Physical: Visit date not found Last MTM visit: Visit date not found Last visit same specialty: 3/20/2019 Huy Bruce MD.  Next visit within 3 mo: Visit date not found  Next physical within 3 mo: Visit date not found      Eliana Dyson, [facility] Triage/Med Refill 6/10/2020    Requested Prescriptions   Pending Prescriptions Disp Refills     traZODone (DESYREL) 50 MG tablet [Pharmacy Med Name: TRAZODONE 50MG TABLETS] 135 tablet 2     Sig: TAKE 1 1/2 TABLETS BY MOUTH EVERY NIGHT AT BEDTIME       Tricyclics/Misc Antidepressant/Antianxiety Meds Refill Protocol Failed - 6/9/2020  8:18 AM        Failed - PCP or prescribing provider visit in last year     Last office visit with prescriber/PCP: 3/20/2019 Huy Bruce MD OR same dept: Visit date not found OR same specialty: 3/20/2019 Huy Bruce MD  Last physical: Visit date not found Last MTM visit: Visit date not found   Next visit within 3 mo: Visit date not found  Next physical within 3 mo: Visit date not found  Prescriber OR PCP: Huy Bruce MD  Last diagnosis associated with med order: 1. Insomnia, unspecified type  - traZODone (DESYREL) 50 MG tablet [Pharmacy Med Name: TRAZODONE 50MG TABLETS]; TAKE 1 1/2 TABLETS BY MOUTH EVERY NIGHT AT BEDTIME  Dispense: 135 tablet; Refill: 2    If protocol passes may refill for 12 months if within 3 months of last provider visit (or a total of 15 months).

## 2021-06-09 NOTE — PROGRESS NOTES
ASSESSMENT / Plan  1. Shingles  Recommend continue course of antiviral until completion  We will send prescription for stronger pain medication to help control symptoms at this time  Instructed him not to drive under the influence of these medications which he understands  We will send a prescription for Zofran to help control nausea if not improved with better pain control  Discussed typical course of shingles flare and when to follow-up if needed    I have reconciled all of the medications.       There are no Patient Instructions on file for this visit.    No orders of the defined types were placed in this encounter.    There are no discontinued medications.    No Follow-up on file.    CHIEF COMPLAINT:  Chief Complaint   Patient presents with     Establish Care     shingles on right abdomen/flank x 1 week, taking acyclovir 400 mg 5 times a day pt states it's not helping, still very painful, unable to sleep at night       HISTORY OF PRESENT ILLNESS:  Zhen is a 56 y.o. male presenting to the clinic today for an ED follow up and to discuss his shingles. It has been ten days since he first started to notice symptoms. It started out as a sharp pain on his right side. It was initially suspected to be kidney stones when he presented to the ED, so a CT was done. He was started on acyclovir once it was assumed to be shingles; he started the medication within a day of the onset of pain. He is nauseous and in a lot of pain. He inquires if there is anything else he can do for it. He has been taking hydrocodone-acetaminophen, which helps, but it only lasts for two or three hours. The pain wakes him up during the night, and he has not been getting much sleep as a result. It was discussed that the nausea is likely from the pain. He notes that the nausea seems to get better after he takes the pain medications. He has not noticed a lot of blisters.     REVIEW OF SYSTEMS:   He has started to drink prune juice because he is  "concerned about constipation. It was discussed that he should try MiraLax. All other systems are negative.    PFSH:  His is an . He has some deadlines to meet and plans to go back to work tomorrow.     TOBACCO USE:  History   Smoking Status     Never Smoker   Smokeless Tobacco     Not on file       VITALS:  Vitals:    03/15/17 1415   BP: 112/80   Patient Site: Left Arm   Patient Position: Sitting   Cuff Size: Adult Large   Pulse: 68   Resp: 16   Temp: 98.6  F (37  C)   TempSrc: Oral   Weight: 180 lb 3.2 oz (81.7 kg)   Height: 6' 2\" (1.88 m)     Wt Readings from Last 3 Encounters:   03/15/17 180 lb 3.2 oz (81.7 kg)   03/06/17 180 lb (81.6 kg)     PHYSICAL EXAM:  GENERAL APPEARANCE: Alert, cooperative, no distress, appears stated age   LUNGS: Clear to auscultation bilaterally, respirations unlabored .  HEART: Regular rate and rhythm, S1 and S2 normal, no murmur, rub, or gallop. No lower extremity edema.   SKIN: varying stages of shingles noted on R side posterior lumbar back across side to abdomen- vesicles, blisters and scabing noted  NEUROLOGIC: Grossly normal  PSYCHOLOGIC: Normal mood and affect    ADDITIONAL HISTORY SUMMARIZED (FROM OLD RECORDS OR HISTORY FROM SOMEONE OTHER THAN THE PATIENT OR ANOTHER HEALTHCARE PROVIDER) (2 TOTAL): Reviewed 3/6/2017 ED note regarding shingles.     DECISION TO OBTAIN EXTRA INFORMATION (OLD RECORDS REQUESTED OR HISTORY FROM ANOTHER PERSON OR ACCESSING CARE EVERYWHERE) (1 TOTAL): None.     RADIOLOGY TESTS SUMMARIZED OR ORDERED (XRAY/CT/MRI/DXA) (1 TOTAL): None.    LABS REVIEWED OR ORDERED (1 TOTAL): Labs from 3/6/2017 reviewed.     MEDICINE TESTS SUMMARIZED OR ORDERED (EKG/ECHO) (1 TOTAL): None.    INDEPENDENT REVIEW OF EKG OR X-RAY (2 EACH): None.     The visit lasted a total of 11 minutes face to face with the patient. Over 50% of the time was spent counseling and educating the patient about his shingles.    Low MISHRA, am scribing for and in the presence of, Dr." Teo.    I, Dr. Bruce, personally performed the services described in this documentation, as scribed by Low Grant in my presence, and it is both accurate and complete.    MEDICATIONS:  Current Outpatient Prescriptions   Medication Sig Dispense Refill     acyclovir (ZOVIRAX) 400 MG tablet Take 1 tablet (400 mg total) by mouth 5 (five) times a day for 10 days. 50 tablet 0     HYDROcodone-acetaminophen 5-325 mg per tablet Take 1 tablet by mouth every 6 (six) hours as needed. 20 tablet 0     ondansetron (ZOFRAN ODT) 4 MG disintegrating tablet Take 1 tablet (4 mg total) by mouth every 8 (eight) hours as needed for nausea. 20 tablet 0     oxyCODONE-acetaminophen (PERCOCET) 5-325 mg per tablet Take 1 tablet by mouth every 8 (eight) hours as needed for pain. 30 tablet 0     No current facility-administered medications for this visit.        Total data points: 3

## 2021-06-09 NOTE — PROGRESS NOTES
ASSESSMENT & PLAN:    1. Shingles  Patient continues with symptoms consistent with shingles  Duration now is a little bit over 3 weeks  Pain medication is no longer covering his discomfort to or change over to Percocet  Discussed with the patient hopefully resolution over the next week's time.  Patient is to contact me if symptoms are not improving over the next week, or if rash fades and symptoms continue  Is aware of possibility of postherpetic neuralgia  He will contact me if things are not improving  Percocet was sent to his pharmacy  Is aware possible side effects of medication    There are no Patient Instructions on file for this visit.    No orders of the defined types were placed in this encounter.    There are no discontinued medications.    No Follow-up on file.    CHIEF COMPLAINT:  Chief Complaint   Patient presents with     Herpes Zoster     Rash on right abdomen, painful, itching, burning       HISTORY OF PRESENT ILLNESS:  Zhen is a 56 y.o. male presenting to the clinic today to follow up on his shingles. He was first seen at the ER on 3/5/2017 with complaints of a rash and was diagnosed with shingles. The rash is still present along the right side of his abdomen. It is slightly faded and there are not any new blisters, however he is still in a significant amount of pain. He ran out of pain medication, but it has felt more painful in the past three days. He was taking two hydrocodone per day before, but it was discussed that he could try a stronger pain medication for a little bit longer in hopes that he will start to feel better soon. He has been able to have bowel movements as he has been taking Miralax every day. It was discussed that if the pain does not start to resolve soon that he may have some post herpetic neuralgia. If that ends up being the case, it was discussed that nerve medications could be tried. He has been under stress at work, which is not helping. Getting good rest was encouraged,  if possible. Cold towels have helped distract him from the pain.     REVIEW OF SYSTEMS:   He has a mass on his right abdomen that has gotten bigger. It was discussed that it appears to be a lipoma. All other systems are negative.    PFSH:  Reviewed, as below.     TOBACCO USE:  History   Smoking Status     Never Smoker   Smokeless Tobacco     Not on file       VITALS:  Vitals:    03/31/17 1348   BP: 110/68   Patient Site: Right Arm   Patient Position: Sitting   Cuff Size: Adult Regular   Pulse: 72   Resp: 16   Temp: 97.8  F (36.6  C)   TempSrc: Oral   Weight: 177 lb (80.3 kg)     Wt Readings from Last 3 Encounters:   03/31/17 177 lb (80.3 kg)   03/15/17 180 lb 3.2 oz (81.7 kg)   03/06/17 180 lb (81.6 kg)       PHYSICAL EXAM:  GENERAL APPEARANCE: Alert, cooperative, no distress, appears stated age   LUNGS: Clear to auscultation bilaterally, respirations unlabored .  HEART: Regular rate and rhythm, S1 and S2 normal, no murmur, rub, or gallop.   SKIN: Fading rash across right side and into right upper abdomen, no blisters-still all in one dermatome distribution  NEUROLOGIC: No gross focal deficits  PSYCHOLOGIC: Normal mood and affect      ADDITIONAL HISTORY SUMMARIZED (FROM OLD RECORDS OR HISTORY FROM SOMEONE OTHER THAN THE PATIENT OR ANOTHER HEALTHCARE PROVIDER) (2 TOTAL): Reviewed 3/6/2017 ER note regarding shingles.    DECISION TO OBTAIN EXTRA INFORMATION (OLD RECORDS REQUESTED OR HISTORY FROM ANOTHER PERSON OR ACCESSING CARE EVERYWHERE) (1 TOTAL): None.     RADIOLOGY TESTS SUMMARIZED OR ORDERED (XRAY/CT/MRI/DXA) (1 TOTAL): None.    LABS REVIEWED OR ORDERED (1 TOTAL): None.    MEDICINE TESTS SUMMARIZED OR ORDERED (EKG/ECHO) (1 TOTAL): None.    INDEPENDENT REVIEW OF EKG OR X-RAY (2 EACH): None.     The visit lasted a total of 8 minutes face to face with the patient. Over 50% of the time was spent counseling and educating the patient about his shingles.    Low MISHRA, am scribing for and in the presence of,   Teo.    I, Dr. Bruce, personally performed the services described in this documentation, as scribed by Low Grant in my presence, and it is both accurate and complete.    MEDICATIONS:  Current Outpatient Prescriptions   Medication Sig Dispense Refill     HYDROcodone-acetaminophen 5-325 mg per tablet Take 1 tablet by mouth every 6 (six) hours as needed. 20 tablet 0     oxyCODONE-acetaminophen (PERCOCET) 5-325 mg per tablet Take 1 tablet by mouth 2 (two) times a day as needed for pain. 30 tablet 0     No current facility-administered medications for this visit.        Total Data Points: 2

## 2021-06-11 NOTE — PROGRESS NOTES
Assessment & Plan:  1. Anxiety and depression  Given that patient is leaving out of town and also that he has tolerated Prozac well before we will start this at 10 mg today.  Also gave patient a referral for psych to pursue therapy.  He recommend requested someone who also has a good understanding of as phylicia as he has run into trouble with finding good therapist that he fits well with in the past.  Encouraged him and his wife to discuss marriage counseling further to decide if they want to pursue this before separation.  Patient given mental health crisis line for [facility] to use as needed, no current acute thoughts of self-harm or suicidal homicidal ideation.  - FLUoxetine (PROZAC) 10 MG capsule; Take 1 capsule (10 mg total) by mouth daily.  Dispense: 30 capsule; Refill: 2    2. Insomnia  Insomnia related to current events and also will be traveling in her jet leg overseas.  He has used Ambien as needed for sleep for travel before and requests this again.  We will give him a few tablets to use as needed, discussed that would prefer if this would not become a chronic medication and also that if it would be he needs to return for further evaluation and controlled substance agreement.      There are no Patient Instructions on file for this visit.    No orders of the defined types were placed in this encounter.    There are no discontinued medications.        The following are part of a depression follow up plan for the patient:  taking history of mental health management    Chief Complaint:   Chief Complaint   Patient presents with     Anxiety     Depression     was on prozac before        History of Present Illness:  Zhen is a 56 y.o. male presenting to the clinic today discuss worsening anxiety and depression.  Patient is here today with his wife, however they have had recent discussions about separation.  He is quite upset about this and this is added to his symptoms. They have done therapy in the past but  "it has not been helpful. He is interested in getting set up with a counselor and restarting Prozac. He has taken this in the past and it has worked well. Some fleeting thoughts of self harm, but no plans and has crisis number information. Feels he has people he can talk to. He has a history of Asperger Syndrome, and has had trouble with counselors understanding his thought process in the past. He would like to try a therapist who has more knowledge of this.      Review of Systems:  All other systems are negative except as noted above.    PFSH:  Marital Status:     Tobacco Use:  History   Smoking Status     Never Smoker   Smokeless Tobacco     Not on file       Vitals:  Vitals:    06/23/17 1007   BP: 112/72   Patient Site: Right Arm   Patient Position: Sitting   Cuff Size: Adult Regular   Pulse: 74   Resp: 16   Temp: 97.9  F (36.6  C)   TempSrc: Oral   Weight: 172 lb 3 oz (78.1 kg)   Height: 6' 2\" (1.88 m)     Wt Readings from Last 3 Encounters:   06/23/17 172 lb 3 oz (78.1 kg)   03/31/17 177 lb (80.3 kg)   03/15/17 180 lb 3.2 oz (81.7 kg)       Physical Exam:  Constitutional:  Reveals an alert, cooperative, 56-year-old male in no acute distress.  Vitals:  Per nursing notes.  Psychiatric: Anxious appearing, teary during interview, memory intact.     Data Reviewed:  Additional History from Old Records or Another Person Summarized (2 total): None.     Decision to Obtain Extra information (1 total): None.     Radiology Tests Summarized and Ordered (XRAY/CT/MRI/DXA) (1 total): None.    Labs Reviewed and Ordered (1 total): None.    Medicine Tests Summarized and Ordered (EKG/ECHO/COLONOSCOPY/EGD) (1 total): None.    Independent Review of EKG or X-Ray (2 each): None.    The visit lasted a total of 25 minutes face to face with the patient. Over 50% of the time was spent counseling and educating the patient about plan of care.    Medications:  Current Outpatient Prescriptions   Medication Sig Dispense Refill     " FLUoxetine (PROZAC) 10 MG capsule Take 1 capsule (10 mg total) by mouth daily. 30 capsule 2     HYDROcodone-acetaminophen 5-325 mg per tablet Take 1 tablet by mouth every 6 (six) hours as needed. 20 tablet 0     zolpidem (AMBIEN) 5 MG tablet Take 1 tablet nightly as needed for sleep during travel. 10 tablet 0     No current facility-administered medications for this visit.        Total Data Points: 0    DANY Gtz, CNP    This note has been dictated using voice recognition software. Any grammatical or context distortions are unintentional and inherent to the software

## 2021-06-12 ENCOUNTER — COMMUNICATION - HEALTHEAST (OUTPATIENT)
Dept: FAMILY MEDICINE | Facility: CLINIC | Age: 61
End: 2021-06-12

## 2021-06-12 DIAGNOSIS — F33.1 MODERATE EPISODE OF RECURRENT MAJOR DEPRESSIVE DISORDER (H): ICD-10-CM

## 2021-06-15 NOTE — TELEPHONE ENCOUNTER
RN cannot approve Refill Request    RN can NOT refill this medication PCP messaged that patient is overdue for Office Visit. Last office visit: 3/20/2019 Huy Bruce MD Last Physical: Visit date not found Last MTM visit: Visit date not found Last visit same specialty: 3/20/2019 Huy Bruce MD.  Next visit within 3 mo: Visit date not found  Next physical within 3 mo: Visit date not found      Whitney Paredes, [facility] Triage/Med Refill 2/13/2021    Requested Prescriptions   Pending Prescriptions Disp Refills     PARoxetine (PAXIL) 20 MG tablet [Pharmacy Med Name: PAROXETINE 20MG TABLETS] 30 tablet 1     Sig: TAKE 1 TABLET BY MOUTH EVERY MORNING       [facility] Refill Protocol  Failed - 2/12/2021  1:07 PM        Failed - PCP or prescribing provider visit in last year     Last office visit with prescriber/PCP: 3/20/2019 Huy Bruce MD OR same dept: Visit date not found OR same specialty: 3/20/2019 Huy Bruce MD  Last physical: Visit date not found Last MTM visit: Visit date not found   Next visit within 3 mo: Visit date not found  Next physical within 3 mo: Visit date not found  Prescriber OR PCP: Huy Bruce MD  Last diagnosis associated with med order: 1. Moderate episode of recurrent major depressive disorder (H)  - PARoxetine (PAXIL) 20 MG tablet [Pharmacy Med Name: PAROXETINE 20MG TABLETS]; TAKE 1 TABLET BY MOUTH EVERY MORNING  Dispense: 30 tablet; Refill: 1    If protocol passes may refill for 12 months if within 3 months of last provider visit (or a total of 15 months).

## 2021-06-16 NOTE — TELEPHONE ENCOUNTER
RN cannot approve Refill Request    RN can NOT refill this medication Protocol failed and NO refill given. Last office visit: 3/20/2019 Huy Bruce MD Last Physical: Visit date not found Last MTM visit: Visit date not found Last visit same specialty: 3/20/2019 Huy Bruce MD.  Next visit within 3 mo: Visit date not found  Next physical within 3 mo: Visit date not found      Kevin Mercedes, [facility] Triage/Med Refill 4/19/2021    Requested Prescriptions   Pending Prescriptions Disp Refills     PARoxetine (PAXIL) 20 MG tablet [Pharmacy Med Name: PAROXETINE 20MG TABLETS] 30 tablet 1     Sig: TAKE 1 TABLET BY MOUTH EVERY MORNING       [facility] Refill Protocol  Failed - 4/19/2021  9:08 AM        Failed - PCP or prescribing provider visit in last year     Last office visit with prescriber/PCP: 3/20/2019 Huy Bruce MD OR same dept: Visit date not found OR same specialty: 3/20/2019 Huy Bruce MD  Last physical: Visit date not found Last MTM visit: Visit date not found   Next visit within 3 mo: Visit date not found  Next physical within 3 mo: Visit date not found  Prescriber OR PCP: Huy Bruce MD  Last diagnosis associated with med order: 1. Moderate episode of recurrent major depressive disorder (H)  - PARoxetine (PAXIL) 20 MG tablet [Pharmacy Med Name: PAROXETINE 20MG TABLETS]; TAKE 1 TABLET BY MOUTH EVERY MORNING  Dispense: 30 tablet; Refill: 1    If protocol passes may refill for 12 months if within 3 months of last provider visit (or a total of 15 months).

## 2021-06-17 NOTE — PROGRESS NOTES
NEURO-spoke briefly with patient and patient's wife at request of nursing staff.  Reviewed and she will resolved.  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  Has good collateral blood flow with a complete [place].  Discussed what is involved with [place] and allowance for collateral blood flow.  It is known Dr. Sánchez's note, no convincing left MCA branch occlusion noted.  Dr. Raya will follow-up tomorrow.    Antonio Singh MD

## 2021-06-17 NOTE — PROGRESS NOTES
NEURO-spoke with patient's wife, at nurse's request.  Discussed MRI of brain showed no new areas of stroke and there did not appear to be significant mass-effect.  No evidence for hemorrhage.  [facility] of acute rehab at [place] is looking to evaluate him regarding an acute rehabilitation program.  Discussed discharge timing will depend upon how patient is doing and plans for acute rehab.  Note protein C is normal.  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  Will have Dr. Raya call patient's wife tomorrow.  Antonio Singh MD

## 2021-06-17 NOTE — CONSULTS
NEUROLOGY   INPATIENT Consult NOTE       1. Assessment/Plan for Hospital Day: 1     Acute left hemispheric ischemic stroke with high risk of developing malignant cerebral brain edema     clinical presentation: Fluctuating Aphasia with confusion and hemianopia to the right    Last known well: Unknown    Imaging studies: Extensive cortical ischemic left MCA stroke on CT head with left ICA occlusion and M2 occlusion the left    NIH stroke scale:9    Patient is not a candidate for TPA due to the delayed presentation to the emergency room    Patient is not a candidate for IR procedure due to the extensive ischemic stroke and reperfusion risk as well as anatomical presentation of the ICA/M2 occlusion    Recommendations  1. ICU admission  2. Bedrest  3. N.p.o.  4. Curbside neurosurgery to inform them that potentially a craniotomy consultation will be placed, I talked to neurosurgery  5. 81 mg aspirin, risk and benefit versus stroke prevention and pending craniotomy discussed with family and nursing staff  6. Statin once patient is allowed to swallow  7. Lipid panel  8. Hypercoagulable workup  9. CT head in the morning to document size of stroke  10. I avoid MRI brain order due to the prolonged need to lie flat an MRI scan and the absence of additional information we would get at this juncture from an MRI.  This can certainly be done at a later point  11. Echocardiogram  12. Telemetry regarding atrial for ablation  13. Keep blood pressure into permissive hypertension range 200/110 are allowed  14. DVT preventive  15. Speech and swallow evaluation  16. Pharmacy consultation    Data:  Time of onset: Unclear  Contact to neurology: 1:50 PM  Decision not to give TPA 1:50 PM  Discussion with neuroradiology 1:57 PM  Discussion with interventional neuroradiology not to proceed with IR: 2:04 PM  My physical examination of patient: 430PM         1. Neurological Barriers to D/C: Stroke  2. Anticipated D/C: 4-6 days  3. Neurological  Disposition: TCU/acute rehab unit    2. SUBJECTIVE     CC: Yoel Sherman is a 57 y.o. male seen for a stroke.  Patient was on apparently in his usual state of health.  He was yesterday on the phone somewhat odd the wife said.  She was not sure if there was anything wrong with him or not.  Today he went to his work.  The coworkers thought that he was very quiet and did not talk much.  He seems to move normally and was eating doughnut with a coworker but was unusually quiet.  Today then around 1030 the patient went to his former wife to  the children when she noticed that he was somewhat confused with his language.  He was tired and she asked him to lie down.  He slept for 2 hours and came back and was still fairly confused with his language.  He was brought into the emergency room.  He was seen by Dr. Walsh.  He was noted patient was somewhat confused and had an altered language presentation.  Sometimes he was understanding things at other times he had nonsensical speech.  He was not really weak but very agitated and anxious as well as again had language concerns  It was found out that he had a large left MCA stroke.  He had also left ICA occlusion with left M2 occlusions.  He was not a candidate for TPA or interventional procedures due to the unclear time of onset as well as also the demarcation of the stroke and the risk of reperfusion.    Patient was transferred urgently to the neuro critical care unit here at [facility] I discussed the case then with ICU, neurosurgery, nursing staff, wife and patient  I informed the wife that the patient has a high risk of malignant swelling of the stroke  I discussed with her that we will start him normal saline as well as conservative measurements to avoid the swelling of the stroke.  We are monitoring if any neurological change occur so that we have to obtain a CT scan and potentially take him down for craniotomy.     Patient Active Problem  List    Diagnosis Date Noted     Flank pain, acute 2017       No past surgical history on file.    No family history on file.    Social History     Social History     Marital status:      Spouse name: N/A     Number of children: N/A     Years of education: N/A     Occupational History     Not on file.     Social History Main Topics     Smoking status: Never Smoker     Smokeless tobacco: Not on file     Alcohol use Not on file     Drug use: Not on file     Sexual activity: Not on file     Other Topics Concern     Not on file     Social History Narrative       No Known Allergies      3. SCHEDULED MEDICATIONS       sodium chloride  2 g Oral BID    Or     sodium chloride  2 g Enteral Tube BID       4. INFUSIONS       sodium chloride 3 %         5. PRN MEDICATIONS   lidocaine (PF)    6. ROS   Review of systems not obtained due to inability to communicate with the patient.     7. PHYSICAL EXAMINATION   Vital signs in last 24 hours:  Vitals:    18 1545 18 1600   BP: 139/83 134/84   Pulse: 90 83   Resp: 16 21   Temp: 97.8  F (36.6  C)    SpO2: 99% (P) 99%                Neurological Exam:     NIH Stroke Scale    Date of exam 2018  Time of Exam 16:30    1a Level of consciousness:  0=alert; keenly responsive   1b. LOC questions: 2=Performs neither task correctly   1c. LOC commands: 2=Performs neither task correctly   2. Best Gaze: 0=normal   3.Visual: 2=Complete hemianopia   4. Facial Palsy: 0=Normal symmetric movement   5a. Motor left arm: 0=No drift, limb holds 90 (or 45) degrees for full 10 seconds   5b. Motor right arm: 0=No drift, limb holds 90 (or 45) degrees for full 10 seconds   6a. motor left le=No drift, limb holds 90 (or 45) degrees for full 10 seconds   6b Motor right le=No drift, limb holds 90 (or 45) degrees for full 10 seconds   7. Limb Ataxia: 0=Absent   8. Sensory: 0=Normal; no sensory loss   9. Best Language: 3=Mute, global aphasia; no usable speech or auditory  comprehension   10. Dysarthria: 0=Normal   11. Extinction andInattention: 0=No abnormality       NIH Stroke Scale Total:  9               Ngoc Coma Score  Eye openin - Opens eyes on own   Verbal:  3 - Talks, but nonsensical   Motor:  5 - Pushes away noxious stimulus   GCS Total: 12       Mental status:   Alert,   Language is fluent.  He is somewhat puzzled.  He tries to talk to his wife to have her answer the questions.  He has echolalia was repeating words that he hears a.  He does not really follow any commands.  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  He does not have any staccato in his speech.    Cranial nerves:   I: smell    Not tested  II: fundoscopy   wnl  II: visual fields    Full to confrontation  II: pupils    Equal, round, reactive to light  III,VII: ptosis    None  III,IV,VI: extraocular muscles  Full ROM  V: mastication    Normal  V: facial light touch sensation  unclear  V,VII: corneal reflex    Present  VII: facial muscle function - upper  Normal  VII: facial muscle function - lower Normal  VIII: hearing    Not tested  IX: soft palate elevation   Normal  IX,X: gag reflex   unclear  XI: trapezius strength   5/5  XI: sternocleidomastoid strength 5/5  XI: neck flexion strength   5/5  XII: tongue strength    Normal  Sensory exam   Motor exam  pronator drift  right face  normal  Right Face 5/5   left face normal  Left face 5/5   RUE   normal  RUE  5/5 -  LUE  normal  LUE  5/5 -  RLE  normal  RLE  5/5   LLE  normal  LLE  5/5   Reflexes    right left  biceps reflex (C-5 to C-6)   2/4 2/4  triceps reflex (C-7 to C-8)   2/4 2/4  quadriceps reflex (L-2 to L-4)  2/4 2/4  Achilles reflex (L-5 to S-2)  2/4 2/4  Babinski    0 0  Coordination:   Romberg test normal   def  Finger-Nose-Finger testing  copies my movements correctly  \  Rapid Alternating Movements  copies my movements correctly  Gait: def  Muscle tone: normal, without rigidity or spasticity, no tremor    MODIFIED  BLANCA SCALE (MRS) 4  0 No symptoms at all   1 No significant disability despite symptoms; able to carry out all usual duties and activities   2 Slight disability; unable to carry out all previous activities, but able to look after own affairs  without assistance   3 Moderate disability; requiring some help, but able to walk without assistance   4 Moderately severe disability; unable to walk without assistance and unable to attend to own bodily needs without assistance   5 Severe disability; bedridden, incontinent and requiring constant nursing care and attention   6 Dead     8. RESULTS REVIEW       8.1 Imaging studies   All studies were personally reviewed        CT BRAIN WITHOUT CONTRAST; CTA [place] OF AL AND NECK WITH CONTRAST    4/7/2018 1:55 PM     INDICATION: Confusion/delirium. Altered level of consciousness. Stroke code.   TECHNIQUE: Contiguous serial axial images were obtained through the brain without contrast. Upon administration of IV contrast, contiguous serial axial images of the [place] of Al and neck were obtained using thin-section high-resolution CT   angiographic technique with image acquisition during the arterial phase. Post processing performed with generation of 3D MIP angiographic reconstructions. Dose reduction techniques were used.   CONTRAST: 100 mL of Omnipaque 350 intravenous contrast.  COMPARISON: MRI brain 1/19/2017.      FINDINGS:   CT BRAIN WITHOUT CONTRAST: There is a large area of low-attenuation change involving the lateral aspect of essentially the entire left temporal lobe and to a lesser extent lateral left parietal lobe, compatible with an area of acute to subacute ischemia.   The area measures at least 8.6 cm in AP dimension, up to 4.1 cm in craniocaudal dimension along the lateral temporal lobe, and at least 2.5 cm in thickness along the anterior left temporal lobe. No finding for hemorrhagic transformation. Small   hyperdensity within the anterior left sylvian  fissure (axial image 13) is compatible with MCA thrombus.     Otherwise normal gray-white matter differentiation. Although there is left-sided localized sulcal effacement, no midline shift.     CTA [place] OF HUGGINS: There is occlusion of the petrous left ICA with small-caliber more distal left ICA, presumably reflecting retrograde filling from the right A1 segment and left posterior communicating artery. Both anterior cerebral arteries and more   distal anterior cerebral arteries appear normal. There is occlusion of a proximal M2 branch of the left MCA (axial source image 453). This corresponds to the hyperdense vessel sign. Both posterior communicating arteries are patent. Posterior circulation   otherwise unremarkable.     Calvarium is intact, without suspicious lytic or blastic foci. Moderate ethmoid and left frontal sinus mucosal thickening. Moderate bilateral maxillary sinus mucosal thickening with dependent maxillary sinus air-fluid levels, compatible with active sinus   disease. Mild left sphenoid sinus mucosal thickening. Middle ear cavities and mastoid air cells are clear.     Orbits are unremarkable.     CTA OF THE NECK: Visualized aortic arch is unremarkable. There is occlusion of the left ICA just distal to its origin without enhancement seen more distally within the left cervical ICA.     No significant stenosis right carotid system by modified NASCET criteria. Normal appearance of both cervical vertebral arteries without findings for dissection or significant stenosis.     Lung apices are clear. Osseous structures are without suspicious lytic or blastic foci.     IMPRESSION:   CT BRAIN WITHOUT CONTRAST:   1.  Large area of low-attenuation change is involving what appears to be the entirety of the lateral aspect of the left temporal lobe extending to the left parietooccipital region and compatible with an area of acute to subacute ischemia. No findings for   hemorrhagic transformation.   2.  Hyperdense  left MCA sign.   3.  No finding for intracranial hemorrhage or mass.   4.  Extensive paranasal sinus mucosal thickening is noted with dependent air-fluid levels, compatible with active sinus disease.     CTA [place] OF [place]INS:   1.  Occlusion of the petrous left ICA, retrograde filling of the cavernous and more distal segments on the left.   2.  Occlusion of a proximal M2 branch of the left MCA.   3.  The anterior communicating artery, both A1 segments, and both posterior communicating arteries are patent.     CTA OF THE NECK:  1.  Occlusion of the left ICA beginning just distal to its origin and extending throughout its cervical extent on the left.   2.  No significant stenosis right carotid system or either cervical vertebral system.          8.2 Laboratory testing        CBC:   Lab Results   Component Value Date    WBC 10.5 04/07/2018    RBC 5.07 04/07/2018        Lab Results   Component Value Date    ALT 22 05/12/2016    AST 22 05/12/2016    ALKPHOS 96 05/12/2016    BILITOT 0.5 05/12/2016       Results from last 7 days  Lab Units 04/07/18  1348   LN-SODIUM mmol/L 140   LN-POTASSIUM mmol/L 3.6   LN-CHLORIDE mmol/L 102   LN-CO2 mmol/L 27   LN-BLOOD UREA NITROGEN mg/dL 7*   LN-CREATININE mg/dL 0.80   LN-CALCIUM mg/dL 9.9         Lab Results   Component Value Date    TSH 1.21 05/12/2016     Time spent on counseling/coordination of care: 2 Hours. More than 50% of this time during the visit for Zhen Sherman was devoted to counseling and coordination of care, including face to face time with the patient, time spent reviewing data, obtaining patient information, and discussing the case with other health care providers. I discussed time was spent on and off critical care from 1:50 PM to 6 PM managing stroke, care management, ICU issues, TPA, intracranial pressure, neurosurgical coordination.         Buck Pimentel MD, PhD  Neurology & Sleep Medicine

## 2021-06-17 NOTE — PROGRESS NOTES
S Daily Progress Note    Assessment/Plan:    Acute left hemispheric ischemic stroke   thought to be due to occlusive dissection of left ICA  High risk of cerebral edema  Patient was not thought to be a candidate for TPA due to delayed presentation or thrombectomy due to the increase risk of reperfusion injury/hemorrhage with increased size of stroke  Patient did have evaluation by speech and is only aspirating when using straws for thin liquids  On aspirin 81 mg daily  On statins    Hypercoagulable workup     Left ICA dissection  ICA disease just distal to its origin it thought to be dissection  There is no M2 MCA disease seen on cerebral angiogram  On aspirin therapy        Assessment:      Plan:  Management per neurology    Code status:Full Code        Subjective:  Patient is much more alert and oriented and is also making attempts to talk in 2 word sentences    Review of Systems:   Unknown headache symptoms with the patient being aphasic    Current Medications Reviewed via EHR List    Objective:  Vital signs in last 24 hours:  Temp:  [98.1  F (36.7  C)-98.6  F (37  C)] 98.1  F (36.7  C)  Heart Rate:  [61-70] 66  Resp:  [16] 16  BP: (117-153)/(71-84) 137/79  SpO2:  [96 %-98 %] 97 %    Intake/Output last 3 shifts:  I/O last 3 completed shifts:  In: 3874.2 [P.O.:580; I.V.:3294.2]  Out: -       Physical Exam:  EYES: EOM+   NECK: no JVD  HEART : S1 S2 RRR   LUNGS: Clear to auscultation without Crepitations or Wheezing   CNS there is some pronator drift in the right side with aphasia  LOWER LIMBS: No Pedal Edema.        Imaging:  CT scan of the brain showed Broad areas of low-attenuation change are again seen involving the lateral and posterior aspects of the left MCA distribution and compatible with an area of acute ischemia. Overall extent appears stable. No findings for hemorrhagic transformation,   new infarct, or mass. No midline shift.    CT angiogram of the brain showed per preliminary reports   Occlusive  dissection of left ICA  Good collateral blood flow, complete COW  No convincing left MCA branch occlusion at this time    Lab Results:  Personally Reviewed.       Advanced Care Planning  Discharge plan: Unknown at this time      Patrick Wakefield  Date: 4/9/2018  Time: 12:30 PM  Hospitalist Service  Part of this chart was created using a dictation software. Typographic errors, word substitutions, and Grammatical errors may unintentionally occur.

## 2021-06-17 NOTE — PROGRESS NOTES
"Speech Language/Pathology  Speech Therapy Daily Progress Note    Patient presents as alert and cooperative during this session.  An  was not applicable.    Objective  As noted in previous note, I recommended assessing patient's reading comprehension. Parts of Reading Comprehension Battery for Aphasia was administered with the following results:  Word-Visual subtest = 10/10  Word-Auditory(this does not mean words were verbalized to him, he was supposed to read them aloud and then pick the word - he did not read them                Aloud but got all the answers correct) subtest = 10/10  Word-Semantic subtest = 10/10  Functional Reading subtest = 7/10  Synonyms subtest = 9/10 (I needed to demonstrate it 1 time after I wrote down the word synonym)   Sentence-Picture subtest = 9/10  Paragraph-Picture subtest = 3/10  It should be noted that for the first subtest of pointing to the word that matches the picture, I wrote \"Point to the correct word\". He read it aloud but did not understand it. As soon as I demonstrated it 1 time, he was able to complete the entire test after it was modeled to him 1 time.    I did not complete the entire assessment as it was getting progressively more difficult and the last subtest he seemed to be just guessing. There were multiple people in the room and he seemed rushed to get it done.  I get this impression from him with a lot of the therapy we do. Upon further discussion with his wife I found out that he does have high level functioning Asperger's and that he does like things to get done quickly.    Per wife discussion, it seems as if when patient initiates conversation with a topic given piece of paper that he writes on along with his verbal information, he seems to follow the conversation that is presented to him in simple writing and/or pictures that the \"listener\" is presenting to him. As soon as the \"listener\" changes the subject via writing/pictures, the patient has a hard " "time adjusting to the new topic since he did not initiate that topic.    I was talking a lot re: pure word deafness to educate (what little I know) the people in the room. The patient was getting frustrated with me as he kept saying, \"I can't hear what you're saying\". I wanted to see if he would react differently if I started singing. I started singing \"Happy Birthday to you...\" and wrote down \"Happy Birthday\" as I was singing. I did not get any appropriate response from patient, instead he just looked at me confused. I then asked his friend that was standing on the opposite side of the bed from me to start playing some music from his phone. The patient did not react by looking to locate where the music was coming from even with written cue of \"Music?\". Again, patient seemed very confused by this.  It was explained that patient is not necessarily deaf, but that he cannot understand connected, verbal speech.    Patient did have a swallow goal d/t coughing with thin liquids using a straw. This has improved and patient is no longer showing s/s aspiration with straw drinking thin from cup.    Assessment  Best way of communication seems to be with very simple, written information in word or picture format  ALONG with visual cues/modeling.    Plan/Recommendations  Will adjust goals to meet current needs/diagnosis    The ST Care Plan has been reviewed and goals have been revised.    40 speech/language minutes and 10 dysphagia minutes     Marga De La Paz MS, CCC-SLP      "

## 2021-06-17 NOTE — PROCEDURES
[facility] Radiology Post Procedure Note    Procedure:   Cerebral angiogram    Radiologist:   Brett Sánchez    Contrast:   60 ml omnipaque    Air kerma  1298 mGy     Fluoro Time:   4.6 minutes    Medications:   Versed 0.5 mg IV   Fentanyl 25 mcg IV    Sedation Time:   15 minutes    EBL:   Minimal    Complications:   none    Preliminary findings: (see dictation for full detail)  Occlusive dissection of left ICA  Good collateral blood flow, complete COW  No convincing left MCA branch occlusion at this time    Assess/Plan:   Report to Dr Elver Ramos for 4 hours    Brett Sánchez

## 2021-06-17 NOTE — PROGRESS NOTES
Physical Therapy  Ivey Balance Scale (BBS)  Patient Score: 56/56  Ivey Balance Scale (BBS) Cutoff Scores: A score of ? 45/56 indicates an increased risk for falls, or  A score of <51/56 indicates an increased risk for falls in persons with history of falls. A score of <42/56 indicates an increased risk for falls in persons without history of falls. Please note that a score of <40/56 is nearly 100% predictive of a fall occuring in the future.     Score Interpretation/Recommendation: LOW Fall risk.  Abi scored 56/56 on Ivey Balance test.    The BBS is a measure of static and dynamic standing balance that has been validated in community dwelling elderly individuals and individuals who have Parkinson's Disease, MS, and those who are s/p CVA and TBI. The test is administered without an assistive device. Scores from the Ivey are used to determine the probability of falling based on the patient's previous history of falls and their test performance.     Curtis Lan, PT, 4/10/2018

## 2021-06-17 NOTE — PROGRESS NOTES
Unable to obtain Blood sugar prior to eating dinner meal this evening. Will obtain one at  this evening and continue to monitor.   Jeannine Justice 4/10/2018 5:54 PM

## 2021-06-17 NOTE — PROGRESS NOTES
"Speech Language/Pathology  Speech Therapy Daily Progress Note    Patient presents as alert and cooperative during this session.  An  was not applicable.    Objective  Friend from work and mother in room.  Upon entering room, patient sitting in bedside chair sharing pictures on phone with mom and his friend. He was making comments that seemed to be appropriate to the pictures. This is spontaneous speech, which has been noted to be fairly intact.  I introduced myself and patient was noted to not look as confused when I introduced myself compared to yesterday.    Verbal Expression:   - Simple open ended questions = d/t auditory comprehension issue, this was difficult. When I asked him to tell me his name he would ask \"who's name?\", with max cues, he eventually said his name.  Date of Birth was also difficult. Written cues did help at times, but would also confuse the situation.   - Naming Pictures = 30% independently. Pt was unable to utilize my verbal cues to help him say the word as this did not appear to be a word finding deficit, but rather an auditory comprehension deficit as patient was unsure what the task was about.   - Automatic Speech (counting to 10) - with max cues, he was finally able to count to 10, however, this again was r/t comprehension of what is asked of him.    Auditory Comprehension:   - Yes/No ?'s: too difficult, pt basically answering \"no\" to all questions.    Written Expression:  Pt was shown a picture of an object and then I imitated that I wanted him to write down the word. He was able to do this after my cue correctly for 2/8 pictures. He then started drawing the picture instead of writing the word (echolalia in written format?). He did seem immediately aware when he would misspell a word as he crossed it out right away and got a little frustrated. He was unable to self correct, but at least he was aware of the mistake.      Assessment  Slight improvement in verbal " expression.  Mild improvement in auditory comprehension.  Pt continues to get frustrated, but seems a little better today than yesterday.    Plan/Recommendations    The ST Care Plan has been reviewed and current plan remains appropriate.    30 speech/language minutes     Marga De La Paz MS, CCC-SLP

## 2021-06-17 NOTE — PROGRESS NOTES
Speech Language/Pathology  Speech Therapy Daily Progress Note    Attempted to see patient for speech therapy. Patient was at MRI. Patient's mother had questions re: goals of care, current status, deficits she is noticing, etc. Educated and provided support. Will reattempt at a later time today.       Plan/Recommendations    The  Care Plan has been reviewed and current plan remains appropriate.    10 conference minutes     Kori Hoang MA, CCC-SLP

## 2021-06-17 NOTE — PROGRESS NOTES
Speech Language/Pathology  Bedside Swallow Evaluation & Speech Language Evaluation    Problem:  Patient Active Problem List   Diagnosis     Flank pain, acute     Acute ischemic left MCA stroke     Acute ischemic stroke     Onset date: 4/7/18  Reason for evaluation: assess for dysphagia, dysarthria, apraxia, aphasia and cognitive linguistic deficits  Pertinent History: Per Dr. Pimentel(neurologist) note dated 4/8/18: Zhen Sherman is a 57 y.o. male seen for a stroke.    4/8/18 this patient had overnight no new events.  He was allowed to go to the bathroom with assistance.  He has still profound aphasia mostly vertical style was very fluent language but no real understanding.  He does not have any full pneumatic or semantic paraphasias.  On exam today he has a slight right pronator drift.    Wife state overnight.  He seems to have a headache.  The 3% normal saline makes this most dry.  4/7/18 Patient was on apparently in his usual state of health.  He was yesterday on the phone somewhat odd the wife said.  She was not sure if there was anything wrong with him or not.  Today he went to his work.  The coworkers thought that he was very quiet and did not talk much.  He seems to move normally and was eating doughnut with a coworker but was unusually quiet.  Today then around 1030 the patient went to his former wife to  the children when she noticed that he was somewhat confused with his language.  He was tired and she asked him to lie down.  He slept for 2 hours and came back and was still fairly confused with his language.  He was brought into the emergency room.  He was seen by Dr. Walsh.  He was noted patient was somewhat confused and had an altered language presentation.  Sometimes he was understanding things at other times he had nonsensical speech.  He was not really weak but very agitated and anxious as well as again had language concerns  It was found out that he had a large left MCA stroke.  He had also  left ICA occlusion with left M2 occlusions.  He was not a candidate for TPA or interventional procedures due to the unclear time of onset as well as also the demarcation of the stroke and the risk of reperfusion. Patient was transferred urgently to the neuro critical care unit here at [facility] I discussed the case then with ICU, neurosurgery, nursing staff, wife and patient. I informed the wife that the patient has a high risk of malignant swelling of the stroke. I discussed with her that we will start him normal saline as well as conservative measurements to avoid the swelling of the stroke.  We are monitoring if any neurological change occur so that we have to obtain a CT scan and potentially take him down for craniotomy.  Current Diet: NPO  Baseline Diet: Regular textures and Thin liquids    Patient presents as alert and confused during this session.   An  was not applicable     Oral motor function was not impaired. This was difficult to assess d/t patient having difficulty with auditory comprehension.    Bedside Swallow Study    Patient was given thin and regular solid.    Oral Phase:    Dentition/Oral hygiene: intact    Bolus prep and oral control was not impaired. Mastication was not impaired  and the patient used rotary chewing.    Anterior-Posterior transit was not impaired.    Oral stasis did not occur with all textures trialed.    Pharyngeal Phase:    Thin:Patient trialed 2 ounces by straw and cup and presented with immediate cough when drinking from straw and no cough when self drinking from cup.  Initiation of swallow appeared timely.     Regular solid: Patient trialed cracker and presented with no s/s of aspiration. Initiation of swallow appeared timely.     Hyolaryngeal movement appeared intact  with all.         Speech Language Evaluation    Motor speech was not impaired.   Speech intelligibility was approximately 100 % at the sentence level.  Voice was not  impaired.    Auditory comprehension was severely impaired. Patient was able to answer 1 unit yes/no questions with 0% accuracy given max cues. It was clear that patient was confused with this task and did not know what he was supposed to do. This was also seen with following directions . Patient was able to follow 1 step  directions with 0 % accuracy given max cues.    Verbal expression was severely impaired. Confrontational naming was 0 % accurate given max cues. Patient was not able to participate in  Structured conversation. Spontaneous speech noted to be intact at times. He was unable to repeat single words or phrases given max cues. He was able to count to 10 independently given mod cues. He seemed annoyed and confused during this evaluation.    Reading comprehension: pt unable to read and follow written command. He was able to read 1 out of 2 written commands aloud correctly.  Written expression NT    Cognition was not assessed d/t severe language deficit.  Assessment:    Patient presents with mild signs and symptoms of aspiration with thin, this seemed to resolve when straw was taken away and patient self drank from cup. Patient demonstrated no oral and appears to have mild pharyngeal dysphagia.    Patient presents with significant deficits in expressive and receptive language, it appears that receptive language is more impaired that expressive language.    Rehab potential is good based on prior level of function.    Recommendations:    Diet: regular with thin, no straws  Strategies: no straws    Plan: Speech therapy is recommended 4-6x per week to address verbal and receptive language.    Referrals: N/A    15 speech/language minutes and 10 dysphagia minutes     Marga De La Paz MS, CCC-SLP

## 2021-06-17 NOTE — PROGRESS NOTES
ASSESSMENT/PLAN  1. Pure word deafness  Patient is here for follow-up regards to left-sided stroke  Spent time reviewing records with him and his wife was present today  Patient is left with pure word deafness unfortunately at this time and is followed currently with [facility] he has been doing a few different facilities in the last month time as well as rehab he continues speech therapy at home  He fortunately was left with no other physical deficits at this time -communication is very difficult as he really only understands written words and only 1 or 2 at a time  He is having trouble with sleep we discussed with his wife and him today continue with trazodone increasing dosing, starting sertraline try to help with depressive symptoms and sleep  Discussed with him the necessity of continued with aspirin use and statin therapy  We will continue to follow with [facility] and speech therapy at this time  Recommend follow-up in 1 month    2. Acute ischemic left MCA stroke  Reviewed records today with patient and his wife was present  Patient is very frustrated with progress so far he wants a quicker resolution to symptoms which could be permanent and very likely if there is improvement will take many months to maybe years  He is not happy to read this  Please see above  Patient will continue with statin therapy aspirin and will start sertraline today  Follow-up in 1 month        SUBJECTIVE:   Chief Complaint   Patient presents with     Follow-up     follow up from [facility] stroke and acute rehab      Zhen Sherman 57 y.o. male    Current Outpatient Prescriptions   Medication Sig Dispense Refill     aspirin 81 mg chewable tablet Chew 1 tablet (81 mg total) at bedtime.  0     sertraline (ZOLOFT) 25 MG tablet Take 1 tablet (25 mg total) by mouth daily. 30 tablet 2     simvastatin (ZOCOR) 20 MG tablet Take 1 tablet (20 mg total) by mouth daily. 30 tablet 0     No current facility-administered medications for this visit.       Allergies: Review of patient's allergies indicates no known allergies.   No LMP for male patient.    HPI:   Patient is here for stroke follow-up  Patient was seen at [facility] originally transferred to other hospitals and patient has been seen by [facility] neurology as well he is currently followed with male was a neurologist who has had similar patients in the past with this peer word deafness.  We spent time reviewing his records labs and imaging.  Communication is very difficult as the patient cannot understand spoken word but can understand when he reads however can only read 1 or 2 words at a time  He expresses his frustration with the overall process and how slow things are he wants quicker resolution  Discussed with him that this will not be a fast process  He has speech therapy currently at home and he is following with [facility]  They have planned TYRONE next week  Patient has been left with this peer word deafness but no other physical abnormalities  He has not been taking his medications on a regular basis and discussed with him and his wife the necessity for the aspirin and the simvastatin  Worried about increasing symptoms of depression will start the patient on sertraline  Is been having difficulty sleeping as well hope of the sertraline will help but will have him increase the trazodone use as well  Have the patient follow-up in 1 month    ROS: negative except as per HPI    OBJECTIVE:   The patient appears well, alert, oriented x 3, in no distress.  /80 (Patient Site: Right Arm, Patient Position: Sitting, Cuff Size: Adult Regular)  Pulse 72  Resp 16  Wt 173 lb 6 oz (78.6 kg)  BMI 21.67 kg/m2    Lungs: clear, good air entry, no wheezes, rhonchi or rales.   Cardiac: S1 and S2 normal, no murmurs, regular rate and rhythm.   Extremities: show no edema, normal peripheral pulses.   Neurological: patient can not understand spoken words- communicate  Skin: clear, dry, no  rashes/lesions  Psych- normal mood and affect      Pt states an understanding and agrees to the above plan.  Greater than 25 minutes was spent today in interview and examination with Zhen Sherman with more than 50% of that time in counseling and coordination of care.

## 2021-06-17 NOTE — PROGRESS NOTES
"Speech Language/Pathology  Speech Therapy Daily Progress Note    I met with wife as patient getting ready to [facility] in about an hour. 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  I picked this article as it had easy to read/understand about this rare diagnosis and it also talked about treatment.  I explained that I think the best treatment option for him now is to work on his compensatory strategies. He responds well to gestures, pictures and written information.  She agreed. He also does ok with reading lips to a point. Of note, the reading comprehension is not completely intact at sentence level.  I suggested making a communication binder. In this binder I would like there to be tabs with certain topics such as Family, Friends, Work, Hobbies, Places of Interest, etc.  Under these tabs there should be simple written info along with a picture (if possible).  Wife stated that she and her daughters would start working on this.  Wife also had a concern with the education piece of this on getting caregivers (nurses/aids) to understand this diagnosis. She stated that [facility]. I told her that most likely not as this is such a rare condition. I suggested that a simple typed up explanation posted in his room may be a good idea. 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 Please use gestures or write in 1-2 words what you would like\".   Wife agreed that this would be helpful. I stated that I would call over to [facility] to give the therapist there \"a heads up\". She greatly appreciated this.    PLAN: phone call to [facility] speech therapist to discuss pt diagnosis and plan of care discussed with wife prior dc.    ADDENDUM: I was able to talk to JACKI Arteaga at [facility] who will be one of the therapists " working with Hermin[facility]. I discussed the rare condition of ELISABETH/PWD and the plan of care re: binder to help aid in communication, post sign in room re: definition of his diagnosis and education to staff. He verbalized understanding.    30 conference minutes     Marga De La Paz MS, CCC-SLP

## 2021-06-17 NOTE — CONSULTS
"  Clinical Nutrition Therapy Assessment Note      Reason for Assessment:   Zhen Sherman is a 57 y.o. male assessed by the registered Dietitian for consult.  CVA pathway ' may change diet. Dx: CVA. PMH: none specified  Nutrition History:  Information obtained from chart- pt not in room at MRI, no family present  Patients diet prior to admission has been from home. Recent food/fluid intake has been n/a and risk screen incomplete at this time. Patient has been consuming the following supplements none noted. Patient has the following cultural/Orthodox food needs or preferences: none noted  Patient has the following food allergies or intolerances:nkfa    Current Nutrition Prescription:   Diet: Just advanced to Regular no straws  Supplements and Modulars:   Flush Orders:   Propofol Orders:  IV dextrose or Fluids:  sodium chloride 0.9 % with KCl 20 mEq Last Rate: 100 mL/hr (04/08/18 0550)   sodium chloride 3 % Last Rate: 50 mL/hr (04/08/18 0635)       Current Nutrition Intake:  N/a yet    Anthropometrics:  Height: 6' 3\" (190.5 cm)  Admission weight:  Weight: 172 lb 8 oz (78.2 kg)  BMI (Calculated): 21.6  BMI indication: 18.5-24.9 normal weight  Usual body weight :  Wt Readings from Last 6 Encounters:   04/08/18 172 lb 8 oz (78.2 kg)   04/07/18 162 lb (73.5 kg)   06/23/17 172 lb 3 oz (78.1 kg)   03/31/17 177 lb (80.3 kg)   03/15/17 180 lb 3.2 oz (81.7 kg)   03/06/17 180 lb (81.6 kg)     % Usual body weight 96 %  Weight History: 4% loss in past month per weights available    Physical Findings:  The patient has the following physical signs which could indicate malnutrition: deferred until pt available and none noted       GI Status/Output:   The patient's GI symptoms include: none noted    Bowel Sounds present    Skin/Wound:  Elías score Elías Scale Score: 19      Medications:  Medications reviewed.    Labs:  Labs reviewed:    Results from last 7 days  Lab Units 04/07/18  1847 04/07/18  1348   LN-WHITE BLOOD CELL COUNT " thou/uL  --  10.5   LN-HEMOGLOBIN g/dL  --  15.5   LN-HEMATOCRIT %  --  44.9   LN-PLATELET COUNT thou/uL 317 353       Results from last 7 days  Lab Units 04/08/18  1159  04/07/18  1348   LN-SODIUM mmol/L 141  < > 140   LN-POTASSIUM mmol/L  --   --  3.6   LN-CHLORIDE mmol/L  --   --  102   LN-CO2 mmol/L  --   --  27   LN-BLOOD UREA NITROGEN mg/dL  --   --  7*   LN-CREATININE mg/dL  --   --  0.80   LN-CALCIUM mg/dL  --   --  9.9   < > = values in this interval not displayed.      Results from last 7 days  Lab Units 04/08/18  0603   LN-CHOLESTEROL mg/dL 138   LN-TRIGLYCERIDES mg/dL 86   LN-HDL CHOLESTEROL mg/dL 40           Results from last 7 days  Lab Units 04/08/18  0603   LN-MAGNESIUM mg/dL 1.9       Results from last 7 days  Lab Units 04/08/18  0603   LN-PHOSPHORUS mg/dL 2.6       Assessed Nutritional Needs:  Assessment weight is 78 kg, with a weight source of current    Estimated Energy Needs: 5930-1858 kcals daily per 25-30 kcal/kg     Estimated Protein Needs:  g daily, 1.0-1.2 g/kg.    Estimated Fluid Needs: 4183-4188 mls daily, 30-35 mls/kg    Malnutrition: Not noted    Nutrition Risk Level: low risk @ this time    Nutrition dx:  None at this time    Goal:  Tolerate consistency w/o choking/aspiration      Monitoring:  Intake , consistency tolerance, need for additional supplementation.    See Care Plan for Problems, Goals, and Interventions.

## 2021-06-17 NOTE — PROGRESS NOTES
.  Zhen Sherman, 57 y.o. male  admitted for ischemic stroke on 4/7.    TPA was not given because of Time from onset contraindications.    VTE Prophylaxis: SCDs placed on 4/7 and  Heparin given on 4/7 as appropriate prior to the end of hospital day 2.    Antithrombotic: aspirin started on 4/8, as appropriate by end of hospital day 2. Continue antithrombotic therapy on discharge to meet quality measures, unless contraindicated.    Anticoagulation if history of A-fib/flutter: Patient does not have history of A-fib/flutter - anticoagulation not required for medication related stroke core measures.       Statin: LDL = 81, patient not on a statin prior to admission, simvastatin 20mg daily started ; continue statin on discharge to meet quality measures, unless contraindicated.        Thank you for the consult.    Hanna Blackman [facility] 4/8/2018 12:37 PM

## 2021-06-17 NOTE — CONSULTS
"  Clinical Nutrition Therapy Follow-Up Note      Subjective:  Pt visiting with family member.  Family report pt with decreased po intake.  Spouse plans to bring in some food for pt.  Pt declines supplements at this time.    Current Nutrition Prescription:   Diet: Regular no straws    Current Nutrition Intake:  Not recorded.  No breakfast or lunch yesterday d/t procedures.    Anthropometrics:  Height: 6' 3\" (190.5 cm)  Admission weight: 172#  Weight: 174 lb 12.8 oz (79.3 kg)    Physical Findings:  Physical signs which could indicate malnutrition: Not noted.     GI Status/Output:   GI symptoms include:  LBM n/a.    Skin/Wound:   Elías Scale Score: 21    Medications:  Medications reviewed.    Labs:  Labs reviewed:    Malnutrition: Not noted    Nutrition Risk Level: Moderate    Nutrition dx:  None at this time    Goal:  Tolerate consistency w/o choking/aspiration-met    Intervention:  Pt declines supplements at this time.  Encourage po intake.    Monitoring:  Po intake, wt.    See Care Plan for Problems, Goals, and Interventions.             "

## 2021-06-17 NOTE — PROGRESS NOTES
"Spiritual Care Note    Spiritual Assessment:   made a visit with patient and family this morning; patient dealing with the aftermath of a difficult stroke. Patient's wife seems tired and teary as she talks about the reality of what happened finally setting in. Wife states she has, \"So many prayer warriors praying for her  and has her own spiritual resources with her for support.\" Patient seems to have good loving support and he begins to move through this difficult recovery.     Care Provided: Family denies needs and thanked the  for coming and offering support.     Plan of Care: Spiritual care will continue to follow as part of patient's care team.    MCKENZIE Clemons, BCC    "

## 2021-06-17 NOTE — DISCHARGE SUMMARY
[facility] MEDICINE  DISCHARGE SUMMARY     Primary Care Physician: Huy Bruce MD  Admission Date: 4/7/2018   Discharge Provider: Gege Wilson Discharge Date: 4/12/2018   Diet: regular diet   Code Status: Full Code   Activity: activity as tolerated        Condition at Discharge: Stable      REASON FOR PRESENTATION(See Admission Note for Details)   Expressive aphasia    PRINCIPAL & ACTIVE DISCHARGE DIAGNOSES     Active Problems:    Acute ischemic left MCA stroke    Lt. Carotid artery dissection    Antithrombin III deficiency    Pure word deafness    High functioning Asperger syndrome      SIGNIFICANT FINDINGS (Imaging, labs):   Cta Head And Neck    Result Date: 4/7/2018  [facility] CT BRAIN WITHOUT CONTRAST; CTA [place] OF [place] AND NECK WITH CONTRAST  4/7/2018 1:55 PM INDICATION: Confusion/delirium. Altered level of consciousness. Stroke code. TECHNIQUE: Contiguous serial axial images were obtained through the brain without contrast. Upon administration of IV contrast, contiguous serial axial images of the [place] and neck were obtained using thin-section high-resolution CT angiographic technique with image acquisition during the arterial phase. Post processing performed with generation of 3D MIP angiographic reconstructions. Dose reduction techniques were used. CONTRAST: 100 mL of Omnipaque 350 intravenous contrast. COMPARISON: MRI brain 1/19/2017. FINDINGS: CT BRAIN WITHOUT CONTRAST: There is a large area of low-attenuation change involving the lateral aspect of essentially the entire left temporal lobe and to a lesser extent lateral left parietal lobe, compatible with an area of acute to subacute ischemia.  The area measures at least 8.6 cm in AP dimension, up to 4.1 cm in craniocaudal dimension along the lateral temporal lobe, and at least 2.5 cm in thickness along the anterior left temporal lobe. No finding for hemorrhagic transformation. Small hyperdensity within  the anterior left sylvian fissure (axial image 13) is compatible with MCA thrombus. Otherwise normal gray-white matter differentiation. Although there is left-sided localized sulcal effacement, no midline shift. CTA [place] OF [place]: There is occlusion of the petrous left ICA with small-caliber more distal left ICA, presumably reflecting retrograde filling from the right A1 segment and left posterior communicating artery. Both anterior cerebral arteries and more  distal anterior cerebral arteries appear normal. There is occlusion of a proximal M2 branch of the left MCA (axial source image 453). This corresponds to the hyperdense vessel sign. Both posterior communicating arteries are patent. Posterior circulation  otherwise unremarkable. Calvarium is intact, without suspicious lytic or blastic foci. Moderate ethmoid and left frontal sinus mucosal thickening. Moderate bilateral maxillary sinus mucosal thickening with dependent maxillary sinus air-fluid levels, compatible with active sinus  disease. Mild left sphenoid sinus mucosal thickening. Middle ear cavities and mastoid air cells are clear. Orbits are unremarkable. CTA OF THE NECK: Visualized aortic arch is unremarkable. There is occlusion of the left ICA just distal to its origin without enhancement seen more distally within the left cervical ICA. No significant stenosis right carotid system by modified NASCET criteria. Normal appearance of both cervical vertebral arteries without findings for dissection or significant stenosis. Lung apices are clear. Osseous structures are without suspicious lytic or blastic foci.     CT BRAIN WITHOUT CONTRAST: 1.  Large area of low-attenuation change is involving what appears to be the entirety of the lateral aspect of the left temporal lobe extending to the left parietooccipital region and compatible with an area of acute to subacute ischemia. No findings for  hemorrhagic transformation. 2.  Hyperdense left MCA sign. 3.  No  finding for intracranial hemorrhage or mass. 4.  Extensive paranasal sinus mucosal thickening is noted with dependent air-fluid levels, compatible with active sinus disease. CTA [place]: 1.  Occlusion of the petrous left ICA, retrograde filling of the cavernous and more distal segments on the left. 2.  Occlusion of a proximal M2 branch of the left MCA. 3.  The anterior communicating artery, both A1 segments, and both posterior communicating arteries are patent. CTA OF THE NECK: 1.  Occlusion of the left ICA beginning just distal to its origin and extending throughout its cervical extent on the left. 2.  No significant stenosis right carotid system or either cervical vertebral system. Findings for the noncontrast and contrast-enhanced portions of the exam were discussed with Dr. Walsh at 1:45 and 1:55 PM. Findings for the postcontrast-enhanced portion of the exam were discussed with Dr. Sánchez at and Dr. Pimentel at 1:54 and 1:57 PM, respectively.     Ct Head Without Contrast    Result Date: 4/8/2018  [facility] CT HEAD WITHOUT CONTRAST 04/08/2018, 9:17 AM INDICATION: Follow-up infarct. Assess edema. TECHNIQUE: Serial axial images were obtained through the brain without contrast. Dose reduction techniques were used. CONTRAST: None. COMPARISON: CT brain 04/07/2018. FINDINGS: Large area of low-attenuation change is again seen along the lateral posterior aspect of the left MCA distribution compatible with an area of acute ischemia. Overall degree of parenchymal attenuation in the area of ischemia has diminished in the interval compatible with expected evolution of ischemia. No finding for hemorrhagic transformation. There is localized sulcal effacement in the area of ischemia without significant mass effect/midline shift. Lateral and third ventricles are small in size. Gray-white matter differentiation otherwise preserved. Calvarium is intact, without suspicious lytic or blastic foci. Paranasal sinus  mucosal thickening with dependent air-fluid levels are seen within the sphenoid and maxillary sinuses compatible with active sinus disease. Middle ear cavities and mastoid air  cells are clear. Orbits are unremarkable.     1.  Broad areas of low-attenuation change are again seen involving the lateral and posterior aspects of the left MCA distribution and compatible with an area of acute ischemia. Overall extent appears stable. No findings for hemorrhagic transformation, new infarct, or mass. No midline shift.     Mr Brain With Without Contrast    Result Date: 4/10/2018  [facility] HEAD MRI WITHOUT AND WITH IV CONTRAST 4/10/2018 2:05 PM INDICATION: Stroke TECHNIQUE: Head MRI without and with intravenous contrast. CONTRAST: Gadavist 8ml COMPARISON:  Head MRI 04/08/2018 and head and neck CTA 04/07/2018. FINDINGS: Evolving large acute infarct involving the left insula, left temporal lobe and left temporooccipital cortex. No new infarct. No hemorrhagic transformation. The sulci are effaced in the areas of the infarctions. No global mass effect. The vasculature in the areas of infarction demonstrates robust enhancement consistent with hyperperfusion. No ventriculomegaly. Punctate foci of hemosiderin deposition in the left parietal lobe and in the occipital poles. There are unchanged. No new hemorrhage. Normal signal in the remainder of the brain. The pituitary gland is unremarkable. Occluded left carotid siphon. This correlates with the comparison CTA. The calvarium and skull base are unremarkable.  The orbits are unremarkable. Moderate paranasal sinus inflammation. Large left antral air-fluid level. It is stable. The previously noted right antral air-fluid level has resolved. No mastoid inflammation. Normal parotids.     CONCLUSION: 1.  Evolving infarcts in the left insula, left temporal lobe and left temporooccipital cortex. 2.  No new infarct. 3.  No gross mass effect. 4.  Persistent acute sinusitis. It has  minimally improved.     Mr Brain With Without Contrast    Result Date: 4/8/2018  [facility] MR BRAIN WITHOUT AND WITH CONTRAST 04/08/2018, 1:32 PM INDICATION: Stroke. Abnormal prior head CT. TECHNIQUE: Multiplanar T1- and T2-weighted images were obtained through the brain pre and post administration of contrast. CONTRAST: 7.5 ml Gadavist IV. COMPARISON: CT brain 04/08/2018 and MRI brain 01/19/2017. FINDINGS: There is a large confluent area of FLAIR hyperintensity and restricted diffusion involving the lateral aspect of the left temporal and parietal lobes with several additional smaller patchy areas of more posterosuperior parietal restricted diffusion. These areas correspond to the areas of low-attenuation on prior head CT and compatible with areas of acute to subacute ischemia. Small focus of susceptibility is seen within the right occipital white matter compatible with an area of hemosiderin staining from prior petechial hemorrhage or focus of mineralization. A few foci of susceptibility are seen within or along the posterior left parietal and occipital lobes. These are favored to reflect flow within prominent veins but are nonspecific. Although there is localized sulcal effacement in the area of ischemia, no significant mass effect is seen and there is no midline shift or effacement of the ventricular system. Gray-white matter differentiation otherwise preserved. Cerebellar tonsils are normally positioned. Sella is unremarkable. Corpus callosum is normally formed. Visualized marrow space is unremarkable. Major skull base vascular flow-voids are preserved. Orbits are unremarkable. Moderate paranasal sinus mucosal thickening with dependent air-fluid levels within both maxillary sinuses compatible with active sinus disease. Middle ear cavities and mastoid air cells are clear.     1.  Large area of ischemia is seen involving the lateral aspect of the left MCA distribution, very similar in extent to prior  CT. No convincing findings for hemorrhagic transformation. 2. No findings for mass. 3. Paranasal sinus mucosal thickening with air-fluid levels within both maxillary sinuses compatible with active sinus disease. Ischemia within the left MCA distribution discussed with Dr. Buck Pimentel at approximately 2:38 PM.     Ir Cerebral Angiogram    Result Date: 4/9/2018  CAROTID AND CEREBRAL ANGIOGRAM 04/09/2018 2:11 PM INDICATION: 57-year-old man who was last seen normal Friday evening, but was found sometimes Saturday with confusion and aphasia. He was taken to the ER, where head CT and CTA show a relatively large, fairly well delineated left temporal lobe infarct, occlusion of the mid cervical left internal carotid, and possible occlusion of an M2 segment supplying the temporal lobe. He was not felt to be candidate for either IV TPA or thrombectomy because of the long time from last known well, large infarct, and no evidence of any significant ischemic penumbra. However, catheter cerebral angiogram requested to better delineate collaterals in the setting of the occluded left vertebral artery. OPERATIONS: 1. Bilateral common carotid angiograms centered over the carotid vasculature in the neck 2. Subselective right internal carotid angiograms centered over the cerebral vasculature 3. Bilateral vertebral angiograms centered over the cerebral vasculature 4. Left common carotid angiograms centered over the head PROCEDURE: The nature of the procedure was discussed in detail with the patient and their family including potential risks, benefits, and alternatives. All of their questions were answered, and written and verbal informed consent was obtained. The patient was then brought to the neuroangiography suite and placed supine on the table. Both groins were prepped and draped in sterile fashion. In addition, the procedure was performed with administration of intravenous conscious sedation with appropriate  preoperative,  intraoperative, and postoperative evaluation. 15 minutes of supervised face to face conscious sedation time was provided by a radiology nurse under my direct supervision. Under local anesthesia with lidocaine and using sterile technique, the right common femoral artery was directly percutaneously accessed with a 4 Moroccan micropuncture set. A short 5 [place]oc[place] sheath was placed over a short J-wire. A 5 [place] Berenstein was then advanced using fluoroscopic guidance over a Glidewire into the aortic arch and then used to selectively access the following vessels: right common carotid artery, right internal carotid artery, right vertebral artery, left common carotid artery, and  left vertebral artery. Numerous diagnostic biplane angiograms centered over the carotid vasculature in the neck and the cerebral vasculature in the head were obtained in various projections. The catheter and sheath were then removed and hemostasis achieved with 15 minutes of direct compression. The patient appeared to tolerate the procedure well, and there was no evidence of complication. FLUOROSCOPIC TIME: 4.6 minutes. AIR KERMA: 896 mGy. TOTAL CONTRAST: 60 mL Omnipaque-350 SEDATION TIME: 15 minutes. MEDICATIONS: Versed 0.5 milligrams IV Fentanyl 25 micrograms IV ESTIMATED BLOOD LOSS: Minimal. FINDINGS: Complete occlusion of the mid cervical left internal carotid. The appearance is somewhat nonspecific, but is most suggestive of an occlusive dissection. Widely patent left common carotid artery, external carotid, and left carotid bulb and proximal cervical internal carotid, with no significant plaque or stenosis at the left carotid bifurcation by NASCET criteria. There is a complete [place], with brisk filling of the left middle cerebral artery via both a large anterior commuting  artery and large left A1 segment, as well as a relatively large left posterior communicating artery. The supraclinoid left internal carotid appears widely patent,  as does the M1 segment, and main M2 segments of both the anterior posterior divisions. No convincing evidence of a left MCA branch occlusion, though it is somewhat difficult to visualize the smaller anterior temporal branch, which appeared to be occluded on CTA. Otherwise normal carotid and cerebral angiogram. No additional intracranial branch occlusion. Widely patent right common and internal carotid arteries, with no significant plaque or stenosis at the right carotid bifurcation. No right anterior circulation  branch occlusion. Normal posterior circulation with again no posterior circulation branch occlusion or stenosis. Widely patent, large caliber bilateral vertebral arteries.     CONCLUSION: 1.  Complete occlusion of the mid cervical left internal carotid, most likely secondary to an occlusive dissection. 2.  Complete [place], with brisk filling of the left middle cerebral artery via a large anterior communicating artery, large left A1 segment, and large left posterior communicating artery. 3.  Widely patent left M1 segment and M2 segments of both the anterior posterior divisions, with no convincing left middle cerebral artery branch occlusion seen during vertebral or right internal carotid injections. Note, however, that the smaller anterior temporal branch seen on CTA is difficult to definitively visualize via indirect/collateral angiography. 4.  Otherwise normal carotid and cerebral angiogram. 5.  Findings discussed with Dr. Raya. EVALUATION AND STENOSIS DETERMINATION BASED ON NASCET CRITERIA        PENDING LABS      Order Current Status    Factor 5 leiden In process          PROCEDURES ( this hospitalization only)      * No surgery found *    RECOMMENDATION FOR F/U VISIT          DISPOSITION     Acute rehab    SUMMARY OF HOSPITAL COURSE:      57-year-old gentleman with no significant past medical history presented to ED for confused and altered speech.  He did not have any weakness of any sort.   He was found to have left MCA stroke with left ICA dissection.  MRI showed evolving infarct in the left insula, left temporal and occipital cortex and temporal infarct likely being the source of patient's speech problem.  Cerebral angiogram showed complete occlusion of mid cervical left internal carotid due to dissection with brisk filling of left MCA via anterior and posterior communicating arteries.  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  Recent underwent stroke workup with unremarkable echocardiogram.  Patient was started on aspirin and simvastatin.  He is also undergoing hypercoagulable profile in which Antithrombin III activity was borderline low.  Since this could be an effect of acute thrombotic event with the stroke, this will need to be repeated in about a month.  Protein C, homocysteine and factor II assay were normal.  He is being discharged to acute rehab in stable condition.    Discharge Medications with Med changes:        Medication List      START taking these medications          aspirin 81 mg chewable tablet   Dose:  81 mg   81 mg, Oral, QHS       simvastatin 20 MG tablet   Quantity:  30 tablet   Dose:  20 mg   Start taking on:  4/13/2018   Commonly known as:  ZOCOR   20 mg, Oral, DAILY       traMADol 50 mg tablet   Quantity:  30 tablet   Dose:  50 mg   Commonly known as:  ULTRAM   50 mg, Oral, Q6H PRN         CONTINUE taking these medications          FLUoxetine 10 MG capsule   Quantity:  30 capsule   Commonly known as:  PROzac   TAKE 1 CAPSULE(10 MG) BY MOUTH DAILY       zolpidem 5 MG tablet   Quantity:  10 tablet   Commonly known as:  AMBIEN   Take 1 tablet nightly as needed for sleep during travel.                 Rationale for medication changes:      As above        Consults   neurology      Immunizations given this encounter             Discharge Orders  Activity as tolerated    Order Comments: Rest when tired     Discharge diet - Regular     Call MD:  if symptoms get worse     Call MD for:  redness or swelling     Call MD:  if pain or burning when you urinate     Call MD for:  difficulty breathing, headache or visual disturbances     Call MD for:  temperature greater than 101     Call MD for:  severe uncontrolled pain     Call MD for:  constipation (difficulty having a bowel movement)     Call MD for:  dizziness, persistent nausea or vomiting     Call MD for:  weight gain - more than two pounds in a day or five pounds in a week     Give Pneumococcal Vaccine If Not Current     Give Influenza Vaccine (Seasonal) If Not Current     Discharge Condition:  Stabilized     Discharge Summary:  Enclosed     Admission H&P Valid:  Yes     Patient Aware of Diagnosis: Yes     Free of Communicable Disease:   Yes     Rehab Potential:  Fair     Discharge Potential:  Length of Stay 31-90 Days     Vital Signs Per Facility Protocol     Weights Per Facility Protocol     Treatment Options: Full Resuscitation     Physical Therapy Eval and Treat     Occupational Therapy Eval and Treat     Speech Therapy Eval and Treat     Follow-up: Next Physician Nursing Home Rounds     Follow Up:  Update Status Report to Physician Within 1 Week     Follow Up:  With Primary MD in 1-2 Weeks     Follow Up:  With Primary MD in 3-4 Weeks     Electronically Signed       Examination     Vital Signs in last 24 hours:   Temp:  [97.8  F (36.6  C)-98.2  F (36.8  C)] 97.8  F (36.6  C)  Heart Rate:  [57-74] 74  Resp:  [16-18] 18  BP: (125-147)/(72-90) 139/73  SpO2:  [96 %-100 %] 96 %  General appearance: alert, appears stated age and cooperative  Eyes: conjunctivae/corneas clear. PERRL, EOM's intact. Fundi benign.  Lungs: clear to auscultation bilaterally  Heart: regular rate and rhythm, S1, S2 normal, no murmur, click, rub or gallop  Abdomen: soft, non-tender; bowel sounds normal; no masses,  no organomegaly  Extremities: extremities  normal, atraumatic, no cyanosis or edema  Neurologic: Grossly normal except for impaired comprehension      Please see EMR for more detailed significant labs, imaging, consultant notes etc.  Total time spent on discharge: >30 minutes    Gege iWlson MD   [facility] Center[facility]: Ph:[phone]    CC:Huy Bruce MD

## 2021-06-17 NOTE — PROGRESS NOTES
"Pt continues to have aphasia-especially receptive. Answers \"yes\" and nods head to most questions. Cooperative with assessment but difficult to complete as pt doesn't understand all things required to do.     Na levels drawn this shift. [id number]. 3% solution stopped. Dr Thakur notified and new orders were given; see chart. [id number]. Dr Thakur again updated and ordered to disconitnue 3% solution. Next Na check due at 0000. Will update next shift and continue to monitor.     Pt reports headache to family. Tylenol and Ultram given. Pt resting comfortably at this time.    Bed and chair alarms in use.    Jeannine Justice 4/10/2018 10:45 PM   "

## 2021-06-17 NOTE — PROGRESS NOTES
Occupational Therapy    Occupational therapy attempted to use SLUMS cognitive assessment with Patient, however he is unable to follow simple directions due to being receptive aphasic (he is partially expressive aphasic). Patient can mimic movements but he is unable to comprehend what is being said or anything written down. Patient is HIGH RISK for accidents and/or safety issues due to his receptive and expressive aphasia. Recommend Patient attend Acute stroke rehab to assist with rehabilitation and return him to his prior level of high functioning.        SLUMS  Scoring If High School Educated If Less than High School Educated   Normal 27-30 25-30   Mild Neurocognitive Disorder 21-26 20-24   Dementia 1-20 1-19   The SLUMS is a cognitive screening assessment used to identify the presence of cognitive deficits, and/or to identify a change in cognition over time.      Patient Score: 0      4/10/2018 by JONATHAN Love/EMERSON

## 2021-06-17 NOTE — PROGRESS NOTES
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  Their spontaneous language is  intact.  Repeat MRI scan shows evolving infarct in the left insula, left temporal and temporal occipital cortex and temporal infarct likely is the etiology of patient's speech symptoms    Cerebral angiogram shows complete occlusion of the mid cervical left internal carotid due to dissection.  No evidence of left M2 branch on angiogram.  Brisk filling of the left middle cerebral artery via anterior and posterior communicating arteries    Secondary prevention with aspirin and simvastatin.  His lipid profile is normal    Protein C, homocystine, factor II assay were normal.  Antithrombin III activity was borderline low that suggests congenital or acquired Antithrombin deficiency.  This could be an effect of the acute thrombotic event and will need to be repeated in 4-6 weeks MTHFR gene mutation is pending    Echocardiogram shows normal ejection fraction at 63% with no significant valvular heart disease.    Okay to transfer to rehab from neurology standpoint.  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 Carotid artery dissection 04/10/2018     Acute ischemic left MCA stroke 04/07/2018     Flank pain, acute 03/06/2017     Past Medical History: Patient  has no past medical history on file.     SUBJECTIVE     Patient much more alert.  He was able to tell me that he can read and see what he wants to say but when people talk to him he does not understand what they are saying.  He obviously is quite  distraught due to these symptoms     OBJECTIVE     Vital signs in last 24 hours  Temp:  [97.5  F (36.4  C)-98.8  F (37.1  C)] 97.8  F (36.6  C)  Heart Rate:  [54-65] 59  Resp:  [16-18] 16  BP: (125-140)/(70-89) 137/76    Weight:   174 lb 12.8 oz (79.3 kg)    I/O last 24 hours    Intake/Output Summary (Last 24 hours) at 04/11/18 0830  Last data filed at 04/10/18 1645   Gross per 24 hour   Intake             1240 ml   Output                0 ml   Net             1240 ml       Review of Systems   Pertinent items are noted in HPI.    General Physical Exam: Patient is alert and oriented x 2. Vital signs were reviewed and are documented in EMR. Neck was supple, no Carotid bruit, thyromegaly, JVD or lymphadenopathy noted.  Neurological Exam:  Patient is alert and and oriented ×3.  Spontaneous speech is normal.  He can also understand written commands but comprehension is not intact.  Right pronator drift reflexes 2+ with equivocal toes normal finger-nose testing.  Strength appears 5/5.  He is able to mimic gestures.     DIAGNOSTIC STUDIES     Pertinent Radiology   Radiology Results: Personally reviewed image/s and Personally reviewed impression/s    CT  1.  Broad areas of low-attenuation change are again seen involving the lateral and posterior aspects of the left MCA distribution and compatible with an area of acute ischemia. Overall extent appears stable. No findings for hemorrhagic transformation,   new infarct, or mass. No midline shift.  4/7/2018  CT BRAIN WITHOUT CONTRAST:   1.  Large area of low-attenuation change is involving what appears to be the entirety of the lateral aspect of the left temporal lobe extending to the left parietooccipital region and compatible with an area of acute to subacute ischemia. No findings for   hemorrhagic transformation.   2.  Hyperdense left MCA sign.   3.  No finding for intracranial hemorrhage or mass.   4.  Extensive paranasal sinus mucosal thickening is noted with dependent  air-fluid levels, compatible with active sinus disease.     CTA [place] OF [place]:   1.  Occlusion of the petrous left ICA, retrograde filling of the cavernous and more distal segments on the left.   2.  Occlusion of a proximal M2 branch of the left MCA.   3.  The anterior communicating artery, both A1 segments, and both posterior communicating arteries are patent.     CTA OF THE NECK:  1.  Occlusion of the left ICA beginning just distal to its origin and extending throughout its cervical extent on the left.   2.  No significant stenosis right carotid system or either cervical vertebral system.    MRI  4/10/18  1.  Evolving infarcts in the left insula, left temporal lobe and left temporooccipital cortex.  2.  No new infarct.  3.  No gross mass effect.  4.  Persistent acute sinusitis. It has minimally improved.  4/8/18  1.  Large area of ischemia is seen involving the lateral aspect of the left MCA distribution, very similar in extent to prior CT. No convincing findings for hemorrhagic transformation.    2. No findings for mass.    3. Paranasal sinus mucosal thickening with air-fluid levels within both maxillary sinuses compatible with active sinus disease.    Cerebral angiogram  4/9/2018  1.  Complete occlusion of the mid cervical left internal carotid, most likely secondary to an occlusive dissection.  2.  Complete [place], with brisk filling of the left middle cerebral artery via a large anterior communicating artery, large left A1 segment, and large left posterior communicating artery.  3.  Widely patent left M1 segment and M2 segments of both the anterior posterior divisions, with no convincing left middle cerebral artery branch occlusion seen during vertebral or right internal carotid injections. Note, however, that the smaller   anterior temporal branch seen on CTA is difficult to definitively visualize via indirect/collateral angiography.  4.  Otherwise normal carotid and cerebral  angiogram.    Echocardiogram    No previous study for comparison.    Normal left ventricular size, wall thickness and wall motion. Left ventricle ejection fraction is normal. The calculated left ventricular ejection fraction is 63%.    Normal right ventricular size and systolic function.    Bicuspid aortic valve without, stenosis, trace aortic valve regurgitation.    No intracardiac shunt is identified.    Mildly dilated ascending aorta.    Pertinent Labs   Lab Results: personally reviewed.   Recent Results (from the past 24 hour(s))   Sodium lab - every 6 hours    Collection Time: 04/10/18 11:52 AM   Result Value Ref Range    Sodium 140 136 - 145 mmol/L   POCT Glucose    Collection Time: 04/10/18 12:33 PM   Result Value Ref Range    Glucose, POC 91 mg/dL   Sodium lab - every 6 hours    Collection Time: 04/10/18  4:40 PM   Result Value Ref Range    Sodium 161 (HH) 136 - 145 mmol/L   POCT Glucose    Collection Time: 04/10/18  8:27 PM   Result Value Ref Range    Glucose,  mg/dL   Sodium lab - every 6 hours    Collection Time: 04/10/18  9:19 PM   Result Value Ref Range    Sodium 140 136 - 145 mmol/L   Platelet Count - every other day x 3    Collection Time: 04/11/18  3:51 AM   Result Value Ref Range    Platelets 223 140 - 440 thou/uL   Sodium lab - every 6 hours    Collection Time: 04/11/18  3:51 AM   Result Value Ref Range    Sodium 138 136 - 145 mmol/L         HOSPITAL MEDICATIONS       aspirin  81 mg Oral QHS    Or     aspirin  300 mg Rectal QHS     heparin (PF)  5,000 Units Subcutaneous Q12H 09-21     polyethylene glycol  17 g Oral DAILY     senna-docusate  1 tablet Oral BID     simvastatin  20 mg Oral DAILY     sodium chloride  10-30 mL Intravenous Q8H FIXED TIMES     sodium chloride  2 g Oral BID    Or     sodium chloride  2 g Enteral Tube BID        Total time spent for face to face visit, reviewing labs/imaging studies, counseling and coordination of care was: 30 Minutes More than 50% of this time was  spent on counseling and coordination of care.    Alexandro Raya MD  Direct Secure Messaging: [email or web address]  Tel: [phone]    This note was dictated using voice recognition software.  Any grammatical or context distortions are unintentional and inherent to the software.

## 2021-06-17 NOTE — PROGRESS NOTES
"Speech Language/Pathology  Speech Therapy Daily Progress Note    Patient presents as alert and cooperative during this session.  An  was not applicable.    Objective  Per Dr. 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 Basically, he cannot comprehend anything verbally even though hearing is intact. Spontaneous speech, reading and writing are generally intact.    I showed patient pictures from [place] Naming test that he was easily able to name. I showed him two objects and only used 1 word to have him try to identify the object with my verbal cue. He did not seem to comprehend the task as he was repeating what I was saying at times or just verbally identifying both of the objects.  During this entire task, patient would verbalize to me that he cannot hear any words correctly but that he can talk fine. He would state that I would have to write it down. I did do a lot of writing to explain his diagnosis of \"word deafness\" and what I wanted him to do in therapy. I am questioning his reading comprehension. Because of this concern, I shortened his session and plan on completing the Reading Comprehension Battery.    Assessment  New diagnosis of \"word deafness\" or ELISABETH.     Plan/Recommendations  Do RCB this afternoon    The ST Care Plan has been reviewed and current plan remains appropriate.    15 speech/language minutes     Marga De La Paz MS, CCC-SLP      "

## 2021-06-17 NOTE — PROGRESS NOTES
"Interventional Neuroradiology-Pre Sedation assessment    57 year old male admitted on Saturday with a stroke    HPI: Admitted Saturday after being found confused and speech difficulty.  Imaging already showed some stroke so was not a candidate for IV thrombolysis or mechanical thrombectomy.  Dr Raya has requested a catheter angiogram to better evaluate collateral circulation    History and physical reviewed with no changes    No Known Allergies  Labs: Hgb 15.5, Plt 353, creat 0.8    Exam:   /68 (Patient Position: Sitting)  Pulse 66  Temp 98.1  F (36.7  C) (Oral)   Resp 16  Ht 6' 3\" (1.905 m)  Wt 174 lb 3.2 oz (79 kg)  SpO2 99%  BMI 21.77 kg/m2  Gen: lying in bed, NAD. Slightly sleepy  Neuro: opens eyes to voice. He is oriented to himself, family.  Cannot name the month or location.  Says a few words. [place] spontaneously but does not follow commands. Unable to determine strength  Cardiac: S1S2  Lungs: clear  Mallampati: 2    Impression: Okay to proceed with planned procedure using moderate IV sedation    Plan: cerebral angiogram procedure its risks, benefits, and alternatives including but not limited to stroke, bleeding, infection, vessel injury, contrast dye or medication reaction were discussed with patient and his wife.  Questions answered and his wife gives consent to proceed as he remains confused and has received medication for pain.    DANY Andrade, CNP        "

## 2021-06-17 NOTE — PROGRESS NOTES
Speech Language/Pathology  Speech Therapy Daily Progress Note    Patient presents as alert and cooperative during this session.  An  was not applicable.    Objective  A/C:  ID Object F:3- % with repetition, increasing as task progressed  ID Number Stated with Phone keypad- % with repetition, increasing as task progressed; did best when he was able to verbally repeat the number first  Single Word Repetition (a/c focused)- 80% with repetition    V/E:  Naming pictures: 80% for common items    Communicated that he can say it but can't figure it out, indicating emerging awareness. He was asking questions about 'why' and 'what' was happening. Educated patient via visual model of pathways, blockages, etc and patient appeared to have increased comprehension and awareness of receptive aphasia.     Assessment  Highly motivated and works hard but severe receptive aphasia. Continues to have expressive aphasia as well, however, improved.     Plan/Recommendations  Continue ST. Patient would greatly benefit from acute rehab from a speech therapy standpoint.     The ST Care Plan has been reviewed and current plan remains appropriate.    28 speech/language minutes     Kori Hoang MA, CCC-SLP

## 2021-06-17 NOTE — PROGRESS NOTES
Progress Note    Assessment/Plan  1. Acute ischemic left MCA stroke;  Lt. Internal Carotid artery dissection; complete occlusion of mid cervical left ICA seen on cerebral angiogram. Surprisingly minimal neuro symptoms given large area of left MCA infarct on imaging. On ASA and simvastatin. Brisk filling of left MCA via anterior and posterior communicating arteries. Plan for discharge to rehab with ongoing speech therapy.     Hypercoag work up revealing normal protein C activity       Subjective  Patient resting comfortably. ROS negative.    Objective    Vital signs in last 24 hours  Temp:  [97.5  F (36.4  C)-98.7  F (37.1  C)] 98.1  F (36.7  C)  Heart Rate:  [50-70] 62  Resp:  [14-20] 18  BP: (125-150)/(73-89) 137/79  Weight:   174 lb 12.8 oz (79.3 kg)    Intake/Output last 3 shifts  I/O last 3 completed shifts:  In: 3878 [P.O.:360; I.V.:3518]  Out: 1425 [Urine:1425]  Intake/Output this shift:  I/O this shift:  In: 266 [P.O.:266]  Out: -     Physical Exam  General appearance: alert and cooperative  Lungs: clear to auscultation bilaterally  Heart: regular rate and rhythm, S1, S2 normal  Abdomen: soft, non-tender  Extremities: atraumatic, no cyanosis or edema  Neurologic: a,a,ox3. Power 5/5 all extremities. Speech is much improved today.            Meds    sodium chloride 0.9 % with KCl 20 mEq 100 mL/hr (04/10/18 1149)     sodium chloride/acetate 3% (HYPERTONIC) IV solution 75 mL/hr (04/10/18 1231)       aspirin  81 mg Oral QHS    Or     aspirin  300 mg Rectal QHS     [COMPLETED] gadobutrol  8 mL Intravenous Once     heparin (PF)  5,000 Units Subcutaneous Q12H 09-21     polyethylene glycol  17 g Oral DAILY     senna-docusate  1 tablet Oral BID     simvastatin  20 mg Oral DAILY     sodium chloride  10-30 mL Intravenous Q8H FIXED TIMES     sodium chloride  2 g Oral BID    Or     sodium chloride  2 g Enteral Tube BID       Pertinent Labs   Lab Results:       Results from last 7 days  Lab Units 04/10/18  0222  04/10/18  0536 04/10/18  0006  04/09/18  1145  04/08/18  1159  04/07/18  1348   LN-SODIUM mmol/L 140 141  141 141  < > 142  < > 141  < > 140   LN-POTASSIUM mmol/L  --  3.7  --   --   --   --   --   --  3.6   LN-CHLORIDE mmol/L  --  109*  --   --  109*  --  114*  --  102   LN-CO2 mmol/L  --  24  --   --  24  --  20*  --  27   LN-BLOOD UREA NITROGEN mg/dL  --  6*  --   --   --   --   --   --  7*   LN-CREATININE mg/dL  --  0.67*  --   --   --   --   --   --  0.80   LN-CALCIUM mg/dL  --  8.7  --   --   --   --   --   --  9.9   < > = values in this interval not displayed.        Results from last 7 days  Lab Units 04/10/18  0536 04/09/18  0459 04/07/18  1847 04/07/18  1348   LN-WHITE BLOOD CELL COUNT thou/uL 8.8  --   --  10.5   LN-HEMOGLOBIN g/dL 11.3*  --   --  15.5   LN-HEMATOCRIT % 33.9*  --   --  44.9   LN-PLATELET COUNT thou/uL [phone]

## 2021-06-17 NOTE — PROGRESS NOTES
[facility] Daily Progress Note    Assessment/Plan:    Acute left hemispheric ischemic stroke   High risk of cerebral edema  Patient was not thought to be a candidate for TPA due to delayed presentation or thrombectomy due to the increase risk of reperfusion injury/hemorrhage with increased size of stroke  Patient did have evaluation by speech and is only aspirating when using straws for thin liquids  On aspirin 81 mg daily  Will start on statins now that he is able to swallow  Hypercoagulable workup     Left ICA artery obstruction  Just distal to his origin  On aspirin therapy  We will discuss with neurology in the management      Assessment:      Plan:  Start oral diet-not to use any straws as he is aspirating  when straws are been used  Start atorvastatin at 40 mg daily with acute stroke    Code status:Full Code        Subjective:  Per RN and family still has aphasia    Review of Systems:   No headache    Current Medications Reviewed via EHR List    Objective:  Vital signs in last 24 hours:  Temp:  [97.7  F (36.5  C)-98.5  F (36.9  C)] 98.2  F (36.8  C)  Heart Rate:  [68-97] 84  Resp:  [8-30] 16  BP: (106-165)/(57-97) 136/83  SpO2:  [95 %-100 %] 97 %    Intake/Output last 3 shifts:  I/O last 3 completed shifts:  In: 1711 [I.V.:1511; IV Piggyback:200]  Out: -       Physical Exam:  EYES: EOM+   NECK: no JVD  HEART : S1 S2 RRR   LUNGS: Clear to auscultation without Crepitations or Wheezing   CNS Patientdid not woke up with my exam   LOWER LIMBS: No Pedal Edema.        Imaging:  CT scan of the brain showed Broad areas of low-attenuation change are again seen involving the lateral and posterior aspects of the left MCA distribution and compatible with an area of acute ischemia. Overall extent appears stable. No findings for hemorrhagic transformation,   new infarct, or mass. No midline shift.    Lab Results:  Personally Reviewed.       Advanced Care Planning  Discharge plan: Unknown at this time      Patrick Wakefield  Date:  4/8/2018  Time: 12:30 PM  Hospitalist Service  Part of this chart was created using a dictation software. Typographic errors, word substitutions, and Grammatical errors may unintentionally occur.

## 2021-06-17 NOTE — PROGRESS NOTES
NEUROLOGY PROGRESS NOTE     ASSESSMENT & PLAN   Hospital Day#: 2    Left MCA infarction; left ICA dissection    Cerebral angiogram shows complete occlusion of the mid cervical left internal carotid due to dissection.  No evidence of left M2 branch on angiogram.  Brisk filling of the left middle cerebral artery via anterior and posterior communicating arteries    Previously MRI scan done on 4/8/2018 suggested a large area of left MCA infarct but surprisingly patient has minimal marrow findings.  Will repeat MRI scan    Currently on aspirin and simvastatin.    Hypercoagulable workup is pending including anti-thrombin III, factor V Leiden, MTHFR, lupus anticoagulant, protein C, protein S, homocystine.    Echocardiogram shows normal ejection fraction at 63% with no significant valvular heart disease.    Patient will need outpatient speech therapy and a transitional care unit    Neurology Discharge Planning : TBD    Patient Active Problem List    Diagnosis Date Noted     Lt. Carotid artery dissection 04/10/2018     Acute ischemic left MCA stroke 04/07/2018     Flank pain, acute 03/06/2017     Past Medical History: Patient  has no past medical history on file.     SUBJECTIVE     Patient had cerebral angiogram that showed left ICA dissection.  No occlusion was noted in M2 branch with complete [place] of Al and robust filling of middle cerebral artery via anterior and posterior circulations.  Patient alert but continues to have aphasia mostly with the receptive component     OBJECTIVE     Vital signs in last 24 hours  Temp:  [97.5  F (36.4  C)-98.7  F (37.1  C)] 97.5  F (36.4  C)  Heart Rate:  [50-70] 69  Resp:  [14-20] 16  BP: (125-155)/(68-89) 125/73    Weight:   174 lb 12.8 oz (79.3 kg)    I/O last 24 hours    Intake/Output Summary (Last 24 hours) at 04/10/18 0807  Last data filed at 04/10/18 0648   Gross per 24 hour   Intake             3878 ml   Output             1425 ml   Net             2453 ml       Review of  Systems   Pertinent items are noted in HPI.    General Physical Exam: Patient is alert and oriented x 2. Vital signs were reviewed and are documented in EMR. Neck was supple, no Carotid bruit, thyromegaly, JVD or lymphadenopathy noted.  Neurological Exam:  Patient is alert and at times able to answer question.  He has echolalia does not follow commands.  Patient has receptive aphasia.  Right pronator drift reflexes 2+ with equivocal toes normal finger-nose testing.  Strength appears 5/5.  He is able to mimic gestures.  At times verbalizes but appears can     DIAGNOSTIC STUDIES     Pertinent Radiology   Radiology Results: Personally reviewed image/s and Personally reviewed impression/s    CT  1.  Broad areas of low-attenuation change are again seen involving the lateral and posterior aspects of the left MCA distribution and compatible with an area of acute ischemia. Overall extent appears stable. No findings for hemorrhagic transformation,   new infarct, or mass. No midline shift.  4/7/2018  CT BRAIN WITHOUT CONTRAST:   1.  Large area of low-attenuation change is involving what appears to be the entirety of the lateral aspect of the left temporal lobe extending to the left parietooccipital region and compatible with an area of acute to subacute ischemia. No findings for   hemorrhagic transformation.   2.  Hyperdense left MCA sign.   3.  No finding for intracranial hemorrhage or mass.   4.  Extensive paranasal sinus mucosal thickening is noted with dependent air-fluid levels, compatible with active sinus disease.     CTA [place] OF HUGGINS:   1.  Occlusion of the petrous left ICA, retrograde filling of the cavernous and more distal segments on the left.   2.  Occlusion of a proximal M2 branch of the left MCA.   3.  The anterior communicating artery, both A1 segments, and both posterior communicating arteries are patent.     CTA OF THE NECK:  1.  Occlusion of the left ICA beginning just distal to its origin and extending  throughout its cervical extent on the left.   2.  No significant stenosis right carotid system or either cervical vertebral system.    MRI  1.  Large area of ischemia is seen involving the lateral aspect of the left MCA distribution, very similar in extent to prior CT. No convincing findings for hemorrhagic transformation.    2. No findings for mass.    3. Paranasal sinus mucosal thickening with air-fluid levels within both maxillary sinuses compatible with active sinus disease.    Cerebral angiogram  4/9/2018  1.  Complete occlusion of the mid cervical left internal carotid, most likely secondary to an occlusive dissection.  2.  Complete [place] of Al, with brisk filling of the left middle cerebral artery via a large anterior communicating artery, large left A1 segment, and large left posterior communicating artery.  3.  Widely patent left M1 segment and M2 segments of both the anterior posterior divisions, with no convincing left middle cerebral artery branch occlusion seen during vertebral or right internal carotid injections. Note, however, that the smaller   anterior temporal branch seen on CTA is difficult to definitively visualize via indirect/collateral angiography.  4.  Otherwise normal carotid and cerebral angiogram.    Echocardiogram    No previous study for comparison.    Normal left ventricular size, wall thickness and wall motion. Left ventricle ejection fraction is normal. The calculated left ventricular ejection fraction is 63%.    Normal right ventricular size and systolic function.    Bicuspid aortic valve without, stenosis, trace aortic valve regurgitation.    No intracardiac shunt is identified.    Mildly dilated ascending aorta.    Pertinent Labs   Lab Results: personally reviewed.   Recent Results (from the past 24 hour(s))   Sodium lab - every 6 hours    Collection Time: 04/09/18 11:45 AM   Result Value Ref Range    Sodium 142 136 - 145 mmol/L   POCT Glucose    Collection Time: 04/09/18  11:45 AM   Result Value Ref Range    Glucose,  mg/dL   Chloride    Collection Time: 04/09/18 11:45 AM   Result Value Ref Range    Chloride 109 (H) 98 - 107 mmol/L   Carbon Dioxide (CO2)    Collection Time: 04/09/18 11:45 AM   Result Value Ref Range    CO2 24 22 - 31 mmol/L   POCT Glucose    Collection Time: 04/09/18  4:48 PM   Result Value Ref Range    Glucose,  mg/dL   Sodium lab - every 6 hours    Collection Time: 04/09/18  5:01 PM   Result Value Ref Range    Sodium 142 136 - 145 mmol/L   POCT Glucose    Collection Time: 04/09/18  8:56 PM   Result Value Ref Range    Glucose, POC 89 mg/dL   POCT Glucose    Collection Time: 04/09/18 11:34 PM   Result Value Ref Range    Glucose, POC 88 mg/dL   Sodium lab - every 6 hours    Collection Time: 04/10/18 12:06 AM   Result Value Ref Range    Sodium 141 136 - 145 mmol/L   POCT Glucose    Collection Time: 04/10/18  5:14 AM   Result Value Ref Range    Glucose, POC 85 mg/dL   Basic Metabolic Panel    Collection Time: 04/10/18  5:36 AM   Result Value Ref Range    Sodium 141 136 - 145 mmol/L    Potassium 3.7 3.5 - 5.0 mmol/L    Chloride 109 (H) 98 - 107 mmol/L    CO2 24 22 - 31 mmol/L    Anion Gap, Calculation 8 5 - 18 mmol/L    Glucose 94 70 - 125 mg/dL    Calcium 8.7 8.5 - 10.5 mg/dL    BUN 6 (L) 8 - 22 mg/dL    Creatinine 0.67 (L) 0.70 - 1.30 mg/dL    GFR MDRD Af Amer >60 >60 mL/min/1.73m2    GFR MDRD Non Af Amer >60 >60 mL/min/1.73m2   Sodium lab - every 6 hours    Collection Time: 04/10/18  5:36 AM   Result Value Ref Range    Sodium 141 136 - 145 mmol/L   HM1 (CBC with Diff)    Collection Time: 04/10/18  5:36 AM   Result Value Ref Range    WBC 8.8 4.0 - 11.0 thou/uL    RBC 3.63 (L) 4.40 - 6.20 mill/uL    Hemoglobin 11.3 (L) 14.0 - 18.0 g/dL    Hematocrit 33.9 (L) 40.0 - 54.0 %    MCV 93 80 - 100 fL    MCH 31.1 27.0 - 34.0 pg    MCHC 33.3 32.0 - 36.0 g/dL    RDW 12.3 11.0 - 14.5 %    Platelets 248 140 - 440 thou/uL    MPV 10.4 8.5 - 12.5 fL    Neutrophils % 66  50 - 70 %    Lymphocytes % 18 (L) 20 - 40 %    Monocytes % 14 (H) 2 - 10 %    Eosinophils % 1 0 - 6 %    Basophils % 1 0 - 2 %    Neutrophils Absolute 5.9 2.0 - 7.7 thou/uL    Lymphocytes Absolute 1.6 0.8 - 4.4 thou/uL    Monocytes Absolute 1.2 (H) 0.0 - 0.9 thou/uL    Eosinophils Absolute 0.1 0.0 - 0.4 thou/uL    Basophils Absolute 0.1 0.0 - 0.2 thou/uL         HOSPITAL MEDICATIONS       aspirin  81 mg Oral QHS    Or     aspirin  300 mg Rectal QHS     heparin (PF)  5,000 Units Subcutaneous Q12H 09-21     polyethylene glycol  17 g Oral DAILY     senna-docusate  1 tablet Oral BID     simvastatin  20 mg Oral DAILY     sodium chloride  10-30 mL Intravenous Q8H FIXED TIMES     sodium chloride  2 g Oral BID    Or     sodium chloride  2 g Enteral Tube BID        Total time spent for face to face visit, reviewing labs/imaging studies, counseling and coordination of care was: 30 Minutes More than 50% of this time was spent on counseling and coordination of care.    Alexandro Raya MD  Direct Secure Messaging: [email or web address]  Tel: [phone]    This note was dictated using voice recognition software.  Any grammatical or context distortions are unintentional and inherent to the software.

## 2021-06-17 NOTE — H&P
Zhen Sherman   1960   MRN [id number] Code status:  Full Code   PCP: Huy Bruce MD, [phone]  [facility]       Assessment and Plan     Assessment:    Acute left hemispheric ischemic stroke   High risk of cerebral edema  Patient was not thought to be a candidate for TPA due to delayed presentation or thrombectomy due to the increase risk of reperfusion injury/hemorrhage with increased size of stroke  Patient is n.p.o.  On a bedrest  On aspirin 81 mg daily  Will be started on statins when he is able to swallow  Hypercoagulable workup  Repeat CT in the morning  Appreciate neurology consultation and recommendations    Left intracranial artery obstruction  Just distal to his origin  On aspirin therapy  We will discuss with neurology in the management    Plan:  Management as per neurology  Neurosurgical consultation later  CT scan in the morning to reevaluate signs of stroke  Speech and swallowing evaluation and treatment  Telemetry monitoring to evaluate for any irregular heartbeat  N.p.o. 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  There is no history of hypertension, diabetes, smoking, family history of early stroke or any irregular heartbeat to the family knows about.  History is got from the family at the patient has expressive aphasia.  He kept on telling me he has and is pointing towards his head.     Patient was doing well until around 10 AM in the morning when he visited his wife who lives in a different house in the process of divorce.  He wanted to go and rest as he felt tired.  He rested for about 2 hours following which he seemed confused and was not making sense when he was talking.  There is no nausea vomiting or any incontinence of bowel or bladder seen.  Patient's wife  took him then to [facility] emergency department from where he was [facility] to [facility] for further monitoring in neuro ICU.  There is no weakness in hands or legs.           Active Ambulatory (Non-Hospital) Problems    Diagnosis     Flank pain, acute     No past medical history on file.  Patient Active Problem List    Diagnosis Date Noted     Flank pain, acute 03/06/2017        He  has no past surgical history on file.   No past surgical history on file.    Current Facility-Administered Medications   Medication Dose Route Frequency Provider Last Rate Last Dose     acetaminophen tablet 500 mg (TYLENOL)  500 mg Oral Q4H PRN Buck Pimentel MD        Or     acetaminophen 500 mg/15.62 mL solution 500 mg (TYLENOL)  500 mg Enteral Tube Q4H PRN Buck Pimentel MD        Or     acetaminophen suppository 650 mg (TYLENOL)  650 mg Rectal Q4H PRN Buck Pimentel MD         [START ON 4/8/2018] aspirin chewable tablet 81 mg  81 mg Oral DAILY Buck Pimentel MD        Or     [START ON 4/8/2018] aspirin suppository 300 mg  300 mg Rectal DAILY Buck Pimentel MD         bisacodyl suppository 10 mg (DULCOLAX)  10 mg Rectal Daily PRN Buck Pimentel MD         heparin (PF) subcutaneous injection 5,000 Units  5,000 Units Subcutaneous Q12H 09-21 Buck Pimentel MD         labetalol injection 10 mg (NORMODYNE,TRANDATE)  10 mg Intravenous Q10 Min PRN Buck Pimentel MD         magnesium hydroxide suspension 30 mL (MILK OF MAG)  30 mL Oral Daily PRN Buck Pimentel MD         ondansetron injection 4 mg (ZOFRAN)  4 mg Intravenous Q4H PRN Buck Pimentel MD        Or     ondansetron tablet 8 mg (ZOFRAN)  8 mg Oral Q8H PRN Buck Pimentel MD         [START ON 4/8/2018] polyethylene glycol packet 17 g (MIRALAX)  17 g Oral DAILY Buck Pimentel MD         senna-docusate 8.6-50 mg tablet 1 tablet (PERICOLACE)  1 tablet Oral BID Buck Pimentel MD         [START ON 4/8/2018] simvastatin tablet 20 mg (ZOCOR)  20 mg Oral DAILY Buck Pimentel MD          sodium chloride 0.9 % with KCl 20 mEq/L infusion  100 mL/hr Intravenous Continuous Buck Pimentel  mL/hr at 04/07/18 1822 100 mL/hr at 04/07/18 1822     sodium chloride 3 % injection (HYPERTONIC)   Intravenous Continuous Buck Pimentel MD 75 mL/hr at 04/07/18 1823       sodium chloride flush 10-20 mL (NS)  10-20 mL Intravenous PRN Buck Pimentel MD         sodium chloride flush 10-30 mL (NS)  10-30 mL Intravenous PRN Buck Pimentel MD         sodium chloride flush 10-30 mL (NS)  10-30 mL Intravenous Q8H FIXED TIMES Buck Pimentel MD         sodium chloride flush 20 mL (NS)  20 mL Intravenous PRN Buck Pimentel MD         sodium chloride tablet 2 g  2 g Oral BID Buck Pimentel MD        Or     sodium chloride tablet 2 g  2 g Enteral Tube BID Buck Pimentel MD         No Known Allergies    Prescriptions Prior to Admission   Medication Sig Dispense Refill Last Dose     FLUoxetine (PROZAC) 10 MG capsule TAKE 1 CAPSULE(10 MG) BY MOUTH DAILY 30 capsule 3 Unknown at Unknown time     zolpidem (AMBIEN) 5 MG tablet Take 1 tablet nightly as needed for sleep during travel. 10 tablet 0 Unknown at Unknown time       he  reports that he has never smoked. He does not have any smokeless tobacco history on file.  Social History   Substance Use Topics     Smoking status: Never Smoker     Smokeless tobacco: Not on file     Alcohol use Not on file       family history is not on file.    Review Of Systems: 10-point ROS negative, except as noted.  Denies any headache, blurring of vision, double vision, neck pain jaw pain, shoulder pain, chest pain, shortness of breath, cough or increased sputum production. No nausea or vomiting, abdominal pain, diarrhea or constipation.  Denies any urinary symptoms of burning or increased frequency. Denies any weakness of upper or lower extremities or any seizure activity. Fever or chills. Bleeding from anywhere else.  No rashes or cellulitis.         Objective     There is no height or weight on file to  calculate BMI.  Vitals:    04/07/18 1800   BP: 149/87   Pulse: 82   Resp: 18   Temp:    SpO2: 97%       General : Alert, cooperative, no distress,   Skin:  Skin color, texture, turgor normal, no rashes or lesions  Head:  Normocephalic, without obvious abnormality, atraumatic  HEENT: PERRL, conjunctiva/corneas clear, EOM's intact  Neck:  Supple,no Jugular venous distension  Lungs:  Clear to auscultation bilaterally, respirations unlabored  Heart: Regular rate and rhythm, S1, S2 normal, no murmur, rub or gallop  Abdomen: Soft, non-tender, no guarding, bowel sounds active,  no masses, no organomegaly  Extremities :Extremities normal, atraumatic, no cyanosis or edema  Neurologic:  Alert and Oriented x 3, Expressive Aphasia.   .    Pertinent Labs Reviewed  CT scan of the brain as well as CTA of brain and neck showed  CT BRAIN WITHOUT CONTRAST:   1.  Large area of low-attenuation change is involving what appears to be the entirety of the lateral aspect of the left temporal lobe extending to the left parietooccipital region and compatible with an area of acute to subacute ischemia. No findings for   hemorrhagic transformation.   2.  Hyperdense left MCA sign.   3.  No finding for intracranial hemorrhage or mass.   4.  Extensive paranasal sinus mucosal thickening is noted with dependent air-fluid levels, compatible with active sinus disease.     CTA [place] OF [place]GGINS:   1.  Occlusion of the petrous left ICA, retrograde filling of the cavernous and more distal segments on the left.   2.  Occlusion of a proximal M2 branch of the left MCA.   3.  The anterior communicating artery, both A1 segments, and both posterior communicating arteries are patent.     CTA OF THE NECK:  1.  Occlusion of the left ICA beginning just distal to its origin and extending throughout its cervical extent on the left.   2.  No significant stenosis right carotid system or either cervical vertebral system.      Social History, Family History, PMH, PSH,  Medications and Allergies reviewed.  DVT prophylaxis:  Admission type:  Anticipated discharge:        [facility]  Part of this chart was created using a dictation software. Typographic errors, word substitutions, and Grammatical errors may unintentionally occur.

## 2021-06-17 NOTE — PROGRESS NOTES
Pharmacy Note - Admission Medication History    Pertinent Provider Information: Unable to talk to patient due to expressive aphasia.   Wife unsure if patient is compliant and if he is currently taking any other medications, OTCs.  Wife stated that they are currently      ______________________________________________________________________    Prior To Admission (PTA) med list completed and updated in EMR.       PTA Med List   Medication Sig Note Last Dose     FLUoxetine (PROZAC) 10 MG capsule TAKE 1 CAPSULE(10 MG) BY MOUTH DAILY 4/7/2018: Last fill date 12/27/2017 x 30-90 days Unknown at Unknown time     zolpidem (AMBIEN) 5 MG tablet Take 1 tablet nightly as needed for sleep during travel.  Unknown at Unknown time       Information source(s): Family member    Summary of Changes to PTA Med List  New: none  Discontinued: none  Changed: none    Patient was asked about OTC/herbal products specifically.  PTA med list reflects this.    Based on the pharmacist s assessment, the PTA med list information appears somewhat reliable:due to inability of patient to communicate    Patient appears adherent: Unable to assess    Allergies were reviewed, assessed, and updated with the patient.      Patient does not use any multi-dose medications prior to admission.    Thank you for the opportunity to participate in the care of this patient.    Shonna Ornelas, PharmALY  4/7/2018 5:24 PM

## 2021-06-17 NOTE — PROGRESS NOTES
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  Their spontaneous language is  intact.  Repeat MRI scan shows evolving infarct in the left insula, left temporal and temporal occipital cortex and temporal infarct likely is the etiology of patient's speech symptoms    Cerebral angiogram shows complete occlusion of the mid cervical left internal carotid due to dissection.  No evidence of left M2 branch on angiogram.  Brisk filling of the left middle cerebral artery via anterior and posterior communicating arteries    Patient on simvastatin and aspirin.  LDL is normal    Protein C, homocystine, factor II assay were normal.  MT HFR gene mutation is still pending.  Antithrombin III activity was borderline low that suggests congenital or acquired Antithrombin deficiency.  This could be an effect of the acute thrombotic event and will need to be repeated in 4-6 weeks     Echocardiogram shows normal ejection fraction at 63% with no significant valvular heart disease.    Transfer to rehab when okay with hospitalist.  Follow-up with me in 6-8 weeks.  I will sign off.    Neurology Discharge Planning : Transfer to rehab later today.  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 Carotid artery dissection 04/10/2018     Acute ischemic left MCA stroke 04/07/2018     Flank pain, acute 03/06/2017     Past Medical History: Patient  has no past medical history on file.     SUBJECTIVE     Patient about the same at times appeared confused.  Spontaneous speech is normal and is also able to comprehend written instructions but has difficulty  with verbal cues     OBJECTIVE     Vital signs in last 24 hours  Temp:  [97.8  F (36.6  C)-98.2  F (36.8  C)] 97.8  F (36.6  C)  Heart Rate:  [57-74] 74  Resp:  [16-18] 18  BP: (125-147)/(72-90) 139/73    Weight:   174 lb 12.8 oz (79.3 kg)    I/O last 24 hours    Intake/Output Summary (Last 24 hours) at 04/12/18 0825  Last data filed at 04/12/18 0018   Gross per 24 hour   Intake             2135 ml   Output                0 ml   Net             2135 ml       Review of Systems   Pertinent items are noted in HPI.    General Physical Exam: Patient is alert and oriented x 2. Vital signs were reviewed and are documented in EMR. Neck was supple, no Carotid bruit, thyromegaly, JVD or lymphadenopathy noted.  Neurological Exam:  Patient is alert and and oriented ×3.  Spontaneous speech is normal.  He can also understand written commands but comprehension is not intact.  Right pronator drift reflexes 2+ with equivocal toes normal finger-nose testing.  Strength appears 5/5.  He is able to mimic gestures.     DIAGNOSTIC STUDIES     Pertinent Radiology   Radiology Results: Personally reviewed image/s and Personally reviewed impression/s    CT  1.  Broad areas of low-attenuation change are again seen involving the lateral and posterior aspects of the left MCA distribution and compatible with an area of acute ischemia. Overall extent appears stable. No findings for hemorrhagic transformation,   new infarct, or mass. No midline shift.  4/7/2018  CT BRAIN WITHOUT CONTRAST:   1.  Large area of low-attenuation change is involving what appears to be the entirety of the lateral aspect of the left temporal lobe extending to the left parietooccipital region and compatible with an area of acute to subacute ischemia. No findings for   hemorrhagic transformation.   2.  Hyperdense left MCA sign.   3.  No finding for intracranial hemorrhage or mass.   4.  Extensive paranasal sinus mucosal thickening is noted with dependent air-fluid levels,  compatible with active sinus disease.     CTA [place] OF [place]:   1.  Occlusion of the petrous left ICA, retrograde filling of the cavernous and more distal segments on the left.   2.  Occlusion of a proximal M2 branch of the left MCA.   3.  The anterior communicating artery, both A1 segments, and both posterior communicating arteries are patent.     CTA OF THE NECK:  1.  Occlusion of the left ICA beginning just distal to its origin and extending throughout its cervical extent on the left.   2.  No significant stenosis right carotid system or either cervical vertebral system.    MRI  4/10/18  1.  Evolving infarcts in the left insula, left temporal lobe and left temporooccipital cortex.  2.  No new infarct.  3.  No gross mass effect.  4.  Persistent acute sinusitis. It has minimally improved.  4/8/18  1.  Large area of ischemia is seen involving the lateral aspect of the left MCA distribution, very similar in extent to prior CT. No convincing findings for hemorrhagic transformation.    2. No findings for mass.    3. Paranasal sinus mucosal thickening with air-fluid levels within both maxillary sinuses compatible with active sinus disease.    Cerebral angiogram  4/9/2018  1.  Complete occlusion of the mid cervical left internal carotid, most likely secondary to an occlusive dissection.  2.  Complete [place] of [place], with brisk filling of the left middle cerebral artery via a large anterior communicating artery, large left A1 segment, and large left posterior communicating artery.  3.  Widely patent left M1 segment and M2 segments of both the anterior posterior divisions, with no convincing left middle cerebral artery branch occlusion seen during vertebral or right internal carotid injections. Note, however, that the smaller   anterior temporal branch seen on CTA is difficult to definitively visualize via indirect/collateral angiography.  4.  Otherwise normal carotid and cerebral angiogram.    Echocardiogram    No  previous study for comparison.    Normal left ventricular size, wall thickness and wall motion. Left ventricle ejection fraction is normal. The calculated left ventricular ejection fraction is 63%.    Normal right ventricular size and systolic function.    Bicuspid aortic valve without, stenosis, trace aortic valve regurgitation.    No intracardiac shunt is identified.    Mildly dilated ascending aorta.    Pertinent Labs   Lab Results: personally reviewed.   Recent Results (from the past 24 hour(s))   POCT Glucose    Collection Time: 04/11/18 11:52 AM   Result Value Ref Range    Glucose,  mg/dL   POCT Glucose    Collection Time: 04/11/18  4:40 PM   Result Value Ref Range    Glucose,  mg/dL   POCT Glucose    Collection Time: 04/11/18  8:26 PM   Result Value Ref Range    Glucose, POC 91 mg/dL         HOSPITAL MEDICATIONS       aspirin  81 mg Oral QHS    Or     aspirin  300 mg Rectal QHS     heparin (PF)  5,000 Units Subcutaneous Q12H 09-21     polyethylene glycol  17 g Oral DAILY     senna-docusate  1 tablet Oral BID     simvastatin  20 mg Oral DAILY     sodium chloride  10-30 mL Intravenous Q8H FIXED TIMES     sodium chloride  2 g Oral BID    Or     sodium chloride  2 g Enteral Tube BID        Total time spent for face to face visit, reviewing labs/imaging studies, counseling and coordination of care was: 30 Minutes More than 50% of this time was spent on counseling and coordination of care.    Alexandro Raya MD  Direct Secure Messaging: [email or web address]  Tel: [phone]    This note was dictated using voice recognition software.  Any grammatical or context distortions are unintentional and inherent to the software.

## 2021-06-17 NOTE — PROGRESS NOTES
NEUROLOGY PROGRESS NOTE     ASSESSMENT & PLAN   Hospital Day#: 1    Large left MCA infarct; left ICA and left M2 occlusion    Patient was not a candidate for alteplase due to time of onset contraindication.    CT/CTA shows left ICA and left M2 occlusion.  Interventional radiology did not feel patient wells a candidate for thrombectomy.  In spite of large left MCA infarct patient surprisingly has minimal neurologic symptoms that could suggest collateral blood flow.  Will check cerebral angiogram to better define anatomy    Currently on aspirin and simvastatin.    Hypercoagulable workup is pending including anti-thrombin 3, factor V Leiden, lupus anticoagulant, protein C, protein S, homocystine.    Echocardiogram shows normal ejection fraction at 63% with no significant valvular heart disease.    Physical, occupational, speech and pharmacy consult appreciated patient will need inpatient rehab    Neurology Discharge Planning : TBD    Patient Active Problem List    Diagnosis Date Noted     Acute ischemic stroke 04/08/2018     Acute ischemic left MCA stroke 04/07/2018     Flank pain, acute 03/06/2017     Past Medical History: Patient  has no past medical history on file.     SUBJECTIVE     Patient oriented to person denies any complaints.  No change in speech.  According to wife he is high functioning autistic and also is ambidextrous     OBJECTIVE     Vital signs in last 24 hours  Temp:  [98  F (36.7  C)-98.6  F (37  C)] 98.1  F (36.7  C)  Heart Rate:  [61-70] 61  Resp:  [16] 16  BP: (117-153)/(71-84) 117/71    Weight:   174 lb 3.2 oz (79 kg)    I/O last 24 hours    Intake/Output Summary (Last 24 hours) at 04/09/18 0657  Last data filed at 04/09/18 0600   Gross per 24 hour   Intake          3874.16 ml   Output                0 ml   Net          3874.16 ml       Review of Systems   Pertinent items are noted in HPI.    General Physical Exam: Patient is alert and oriented x 2. Vital signs were reviewed and are documented  in EMR. Neck was supple, no Carotid bruit, thyromegaly, JVD or lymphadenopathy noted.  Neurological Exam:  Patient is alert and at times able to answer question.  He has echolalia does not follow commands.  Patient has receptive aphasia.  Right pronator drift reflexes 2+ with equivocal toes normal finger-nose testing.  Strength appears 5/5.  He is able to mimic gestures.  At times verbalizes but appears can     DIAGNOSTIC STUDIES     Pertinent Radiology   Radiology Results: Personally reviewed image/s and Personally reviewed impression/s    CT  1.  Broad areas of low-attenuation change are again seen involving the lateral and posterior aspects of the left MCA distribution and compatible with an area of acute ischemia. Overall extent appears stable. No findings for hemorrhagic transformation,   new infarct, or mass. No midline shift.  4/7/2018  CT BRAIN WITHOUT CONTRAST:   1.  Large area of low-attenuation change is involving what appears to be the entirety of the lateral aspect of the left temporal lobe extending to the left parietooccipital region and compatible with an area of acute to subacute ischemia. No findings for   hemorrhagic transformation.   2.  Hyperdense left MCA sign.   3.  No finding for intracranial hemorrhage or mass.   4.  Extensive paranasal sinus mucosal thickening is noted with dependent air-fluid levels, compatible with active sinus disease.     CTA [place]:   1.  Occlusion of the petrous left ICA, retrograde filling of the cavernous and more distal segments on the left.   2.  Occlusion of a proximal M2 branch of the left MCA.   3.  The anterior communicating artery, both A1 segments, and both posterior communicating arteries are patent.     CTA OF THE NECK:  1.  Occlusion of the left ICA beginning just distal to its origin and extending throughout its cervical extent on the left.   2.  No significant stenosis right carotid system or either cervical vertebral system.    MRI  1.  Large  area of ischemia is seen involving the lateral aspect of the left MCA distribution, very similar in extent to prior CT. No convincing findings for hemorrhagic transformation.    2. No findings for mass.    3. Paranasal sinus mucosal thickening with air-fluid levels within both maxillary sinuses compatible with active sinus disease.    Echocardiogram    No previous study for comparison.    Normal left ventricular size, wall thickness and wall motion. Left ventricle ejection fraction is normal. The calculated left ventricular ejection fraction is 63%.    Normal right ventricular size and systolic function.    Bicuspid aortic valve without, stenosis, trace aortic valve regurgitation.    No intracardiac shunt is identified.    Mildly dilated ascending aorta.    Pertinent Labs   Lab Results: personally reviewed.   Recent Results (from the past 24 hour(s))   POCT Glucose    Collection Time: 04/08/18  8:05 AM   Result Value Ref Range    Glucose, POC 92 mg/dL   Echo Transthoracic    Collection Time: 04/08/18  8:50 AM   Result Value Ref Range    LV volume diastolic 121 62 - 150 cm3    LV volume systolic 44.8 21 - 61 cm3    IVSd 0.976 0.6 - 1.0 cm    LVIDd 5.01 4.2 - 5.8 cm    LVIDs 3.28 2.5 - 4.0 cm    LVOT diam 2.4 cm    LVOT mean gradient 2 mmHg    LVOT peak VTI 21.3 cm    LVOT mean eladia 62.8 cm/s    LVOT peak eladia 93.9 cm/s    LVOT peak gradient 4 mmHg    LV PWd 1 0.6 - 1.0 cm    MV E' lat eladia 11 cm/s    MV E' med eladia 9.46 cm/s    AV mean eladia 91.9 cm/s    AV mean gradient 4 mmHg    AV VTI 30.6 cm    AV peak eladia 139 cm/s    AO root 4.2 cm    AO ascending 4.2 cm    LA size 3.5 cm    MV decel time 246 ms    MV peak A eladia 95.1 cm/s    MV peak E eladia 61.6 cm/s    BSA 2.03 m2    Hieght 75 in    Weight 2760 lbs    /83 mmHg    HR 84 bpm    IVS/PW ratio 1.0     LV FS 34.5 28 - 44 %    Echo LVEF calculated 63 55 - 75 %    LA volume 80.6 mL    LV mass 179.6 g    AV area 3.1 cm2    AV DIM IND eladia 0.7     MV E/A Ratio 0.6     LVOT  area 4.52 cm2    LVOT SV 96.3 cm3    AV peak gradient 7.7 mmHg    LV systolic volume index 22.1 11 - 31 cm3/m2    LV diastolic volume index 59.6 34 - 74 cm3/m2    LA volume index 39.7 mL/m2    LV mass index 88.5 g/m2    LV SVi 47.4 ml/m2    TAPSE 2.5 cm    MV med E/e' ratio 6.5     MV lat E/e' ratio 5.6     LV CO 8.1 l/min    LV Ci 4.0 l/min/m2    LA area 2 24.1 cm2    LA area 1 24.0 cm2    Height 75.0 in    Weight 173 lbs    MV Avg E/e' Ratio 6.0 cm/s    LA length 6.1 cm    AV DIM IND VTI 0.7    Sodium lab - every 6 hours    Collection Time: 04/08/18 11:59 AM   Result Value Ref Range    Sodium 141 136 - 145 mmol/L   Chloride    Collection Time: 04/08/18 11:59 AM   Result Value Ref Range    Chloride 114 (H) 98 - 107 mmol/L   Carbon Dioxide (CO2)    Collection Time: 04/08/18 11:59 AM   Result Value Ref Range    CO2 20 (L) 22 - 31 mmol/L   POCT Glucose    Collection Time: 04/08/18 12:05 PM   Result Value Ref Range    Glucose, POC 99 mg/dL   POCT Glucose    Collection Time: 04/08/18  4:47 PM   Result Value Ref Range    Glucose, POC 88 mg/dL   Sodium lab - every 6 hours    Collection Time: 04/08/18  5:44 PM   Result Value Ref Range    Sodium 140 136 - 145 mmol/L   POCT Glucose    Collection Time: 04/08/18  9:07 PM   Result Value Ref Range    Glucose,  mg/dL   Sodium lab - every 6 hours    Collection Time: 04/08/18 10:37 PM   Result Value Ref Range    Sodium 143 136 - 145 mmol/L   Sodium lab - every 6 hours    Collection Time: 04/09/18  4:59 AM   Result Value Ref Range    Sodium 142 136 - 145 mmol/L   Platelet Count - every other day x 3    Collection Time: 04/09/18  4:59 AM   Result Value Ref Range    Platelets 257 140 - 440 thou/uL         HOSPITAL MEDICATIONS       aspirin  81 mg Oral QHS    Or     aspirin  300 mg Rectal QHS     heparin (PF)  5,000 Units Subcutaneous Q12H 09-21     polyethylene glycol  17 g Oral DAILY     senna-docusate  1 tablet Oral BID     simvastatin  20 mg Oral DAILY     sodium chloride   10-30 mL Intravenous Q8H FIXED TIMES     sodium chloride  2 g Oral BID    Or     sodium chloride  2 g Enteral Tube BID        Total time spent for face to face visit, reviewing labs/imaging studies, counseling and coordination of care was: 30 Minutes More than 50% of this time was spent on counseling and coordination of care.    Alexandro Raya MD  Direct Secure Messaging: [email or web address]  Tel: [phone]    This note was dictated using voice recognition software.  Any grammatical or context distortions are unintentional and inherent to the software.

## 2021-06-17 NOTE — PROGRESS NOTES
Progress Note    Assessment/Plan  1. Acute ischemic left MCA stroke with significant receptive aphasia;  Lt. Internal Carotid artery dissection; complete occlusion of mid cervical left ICA seen on cerebral angiogram. Surprisingly minimal neuro symptoms given large area of left MCA infarct on imaging. On ASA and simvastatin. Brisk filling of left MCA via anterior and posterior communicating arteries. Plan for discharge to rehab with ongoing speech therapy.     Has slightly low anti thrombin III: D/w Dr. Raya. Continue aspirin. Needs to be repeated in a few weeks. Some hypercoagulability work up labs are pending.     Active Problems:    Acute ischemic left MCA stroke    Lt. Carotid artery dissection    Antithrombin III deficiency    Pure word deafness    Discharge Disposition: Acute rehab in AM  Barrier: Placement    Subjective  Resting comfortably, ROS negative    Objective    Vital signs in last 24 hours  Temp:  [97.5  F (36.4  C)-98.8  F (37.1  C)] 98.1  F (36.7  C)  Heart Rate:  [54-65] 62  Resp:  [16-18] 16  BP: (129-140)/(70-89) 140/78  Weight:   174 lb 12.8 oz (79.3 kg)    Intake/Output last 3 shifts  I/O last 3 completed shifts:  In: 1240 [P.O.:366; I.V.:874]  Out: -   Intake/Output this shift:  I/O this shift:  In: 240 [P.O.:240]  Out: -     Physical Exam  General appearance: alert and cooperative  Lungs: clear to auscultation bilaterally  Heart: regular rate and rhythm, S1, S2 normal  Abdomen: soft, non-tender  Extremities: atraumatic, no cyanosis or edema  Neurologic: Significant Receptive aphasia, Moving all 4 extremities spontaneously        Meds    aspirin  81 mg Oral QHS    Or     aspirin  300 mg Rectal QHS     heparin (PF)  5,000 Units Subcutaneous Q12H 09-21     polyethylene glycol  17 g Oral DAILY     senna-docusate  1 tablet Oral BID     simvastatin  20 mg Oral DAILY     sodium chloride  10-30 mL Intravenous Q8H FIXED TIMES     sodium chloride  2 g Oral BID    Or     sodium chloride  2 g Enteral  Tube BID       dextrose 5%-NaCl 0.45% 75 mL/hr (04/11/18 2028)       Pertinent Labs   Lab Results:       Results from last 7 days  Lab Units 04/11/18  0351 04/10/18  2119 04/10/18  1640  04/10/18  0536  04/09/18  1145  04/08/18  1159  04/07/18  1348   LN-SODIUM mmol/L 138 140 161*  < > 141  141  < > 142  < > 141  < > 140   LN-POTASSIUM mmol/L  --   --   --   --  3.7  --   --   --   --   --  3.6   LN-CHLORIDE mmol/L  --   --   --   --  109*  --  109*  --  114*  --  102   LN-CO2 mmol/L  --   --   --   --  24  --  24  --  20*  --  27   LN-BLOOD UREA NITROGEN mg/dL  --   --   --   --  6*  --   --   --   --   --  7*   LN-CREATININE mg/dL  --   --   --   --  0.67*  --   --   --   --   --  0.80   LN-CALCIUM mg/dL  --   --   --   --  8.7  --   --   --   --   --  9.9   < > = values in this interval not displayed.        Results from last 7 days  Lab Units 04/11/18  0351 04/10/18  0536 04/09/18  0459  04/07/18  1348   LN-WHITE BLOOD CELL COUNT thou/uL  --  8.8  --   --  10.5   LN-HEMOGLOBIN g/dL  --  11.3*  --   --  15.5   LN-HEMATOCRIT %  --  33.9*  --   --  44.9   LN-PLATELET COUNT thou/uL 223 248 257  < > 353   < > = values in this interval not displayed.          .

## 2021-06-18 NOTE — PROGRESS NOTES
ASSESSMENT & PLAN:  1. Acute ischemic left MCA stroke  He requests refill of his Zocor which was new since his stroke.  Will recheck nonfasting lipids AST and ALT today.  - simvastatin (ZOCOR) 20 MG tablet; Take 1 tablet (20 mg total) by mouth daily.  Dispense: 90 tablet; Refill: 3  - Lipid Cascade  - AST (SGOT)  - ALT (SGPT)    2.  Mental health concerns  Struggling more with depression over the last 1-2 weeks, and according to his ex-wife here with him today he has been getting agitated quite easily, expressing at least passive suicidal ideation on a daily basis, and seems out of touch with reality, possibly even delusional or manic at times.  It got to the point over the weekend where she even felt unsafe.  During our visit today, his mental status is concerning.  I do not feel comfortable letting him go home without further evaluation, and I spoke with crisis  at [facility] emergency room.  EMS was called to transport the patient to [facility] emergency room on a transport hold.  Patient was cooperative although repeatedly expressed frustration and dissatisfaction with the need to go to the emergency room for this.    There are no Patient Instructions on file for this visit.    Orders Placed This Encounter   Procedures     Lipid Cascade     Order Specific Question:   Fasting is required?     Answer:   No     AST (SGOT)     ALT (SGPT)     Medications Discontinued During This Encounter   Medication Reason     simvastatin (ZOCOR) 20 MG tablet Reorder       No Follow-up on file.    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 male presenting to the clinic today for depression. History of stroke on 4/7/18. He presents with his wife. Wife states that she  called the crisis hotline last night and was told to follow up today. He started on sertraline about 2 weeks ago; taking 25 mg daily. He believes he is doing better on the medication but his wife says otherwise. They are currently  and she serves as his primary caretaker. Wife states that she believes he is manic; reports racing thoughts, getting stuck repeating the same story, and irritability. Wife notes that he sometimes talks about committing suicide. He has lost 10 lbs in the past month. Wife states he has a history of depression on antidepressants, but he denies this.     REVIEW OF SYSTEMS:   He currently follows with [place] regarding word deafness. He recently got his drivers license back on Thursday. He continues on 20 mg simvastatin daily. All other systems are negative.    PFSH:  Medical: Bicuspid aortic valve.  Social: Currently     TOBACCO USE:  History   Smoking Status     Never Smoker   Smokeless Tobacco     Never Used       VITALS:  Vitals:    05/29/18 1413   BP: 132/78   Patient Site: Right Arm   Patient Position: Sitting   Cuff Size: Adult Regular   Pulse: 80   Resp: 16   Temp: 98  F (36.7  C)   TempSrc: Oral   Weight: 165 lb 4.8 oz (75 kg)     Wt Readings from Last 3 Encounters:   05/29/18 165 lb 4.8 oz (75 kg)   05/08/18 173 lb 6 oz (78.6 kg)   04/10/18 174 lb 12.8 oz (79.3 kg)     Body mass index is 20.66 kg/(m^2).    PHYSICAL EXAM:  GENERAL: Pleasant, well-appearing patient in no acute distress.   NEURO: Alert and oriented. Grossly nonfocal.   PSYCHIATRIC: Presents on time and well groomed.  Tearful at times.  During interview is frequently fixated on separation from spouse and daughters, and ex-wife contradicts several of the things he is saying as simply untrue, but he remains adamant about them.  Expresses thoughts of being better off dead.  Seems to have no insight into his current situation.  Gets defensive and somewhat agitated easily.       QUALITY MEASURES:  The following  are part of a depression follow up plan for the patient:  mental health care management and psychiatric follow-up    ADDITIONAL HISTORY SUMMARIZED (2): Reviewed 4/12/18 discharge summary regarding stroke.  DECISION TO OBTAIN EXTRA INFORMATION (1): None.   RADIOLOGY TESTS (1): None.  LABS (1): Labs were ordered today.  MEDICINE TESTS (1): None.  INDEPENDENT REVIEW (2 each): None.     The visit lasted a total of 35 minutes face to face with the patient. Over 50% of the time was spent counseling and educating the patient about depression.    IYana, am scribing for and in the presence of, Dr. Boateng.    I, Dr. Boateng, personally performed the services described in this documentation, as scribed by Yana Frank in my presence, and it is both accurate and complete.    MEDICATIONS:  Current Outpatient Prescriptions   Medication Sig Dispense Refill     aspirin 81 mg chewable tablet Chew 1 tablet (81 mg total) at bedtime.  0     sertraline (ZOLOFT) 25 MG tablet Take 1 tablet (25 mg total) by mouth daily. 30 tablet 2     simvastatin (ZOCOR) 20 MG tablet Take 1 tablet (20 mg total) by mouth daily. 90 tablet 3     traZODone (DESYREL) 50 MG tablet TK 1/2 TO 1 T PO D HS  3     No current facility-administered medications for this visit.        Total data points: 3

## 2021-06-18 NOTE — PROGRESS NOTES
ASSESSMENT/PLAN  1. Depression  Discussed with patient and wife who is present about options  They do not have referral yet at this time for psychiatry which will help them get  The interim due to increasing depressive symptoms and some hyperactivity they would like to do a trial on a previous medication that he was on a few years ago Paxil  On this medication he felt he is not as high functioning and able to do his work as efficiently and so he discontinue the medication they are actually hoping for some of that fact to help him with his depressive symptoms and slight hyperactivity at this time and fast thought process  Discussed with him the concern for bringing out manic type features but they are hoping for similar effects what happened years ago  Discussed with him bring chemistry slightly altered after stroke-their understanding of this but would like to try this medication which will send the pharmacy        SUBJECTIVE:   Chief Complaint   Patient presents with     Follow-up     depression     Zhen Sherman 57 y.o. male    Current Outpatient Prescriptions   Medication Sig Dispense Refill     aspirin 81 mg chewable tablet Chew 1 tablet (81 mg total) at bedtime.  0     simvastatin (ZOCOR) 20 MG tablet Take 1 tablet (20 mg total) by mouth daily. 90 tablet 3     traZODone (DESYREL) 50 MG tablet TK 1/2 TO 1 T PO D HS  3     PARoxetine (PAXIL) 10 MG tablet Take 1 tablet (10 mg total) by mouth daily. 30 tablet 2     No current facility-administered medications for this visit.      Allergies: Pollen extracts   No LMP for male patient.    HPI:   Patient is wife are here for follow-up in regards to increasing depressive symptoms and irritability.  Patient has history of depression and years ago was taking Paxil with some improvement but was not feeling that he is functioning at high efficacy at work and so discontinue the medication he felt kind of blunted by the medication.  Since she has had a stroke is been having  increasing depressive symptoms but also increased irritability.  He is frustrated with the slow progression of symptoms but I am happy to see him today and I discussed with him and his wife who is present he is actually made large changes in regards to understanding speech and being able to communicate better since I saw him last a couple weeks ago.  We did do a trial on sertraline which caused more hyperactivity and his wife was concerned about possible manic type features we discussed the possibility with the entire classification of medications to be possible-they would like to hopefully rely the fact that he previously had no problems with Paxil but did have some control depression and decreased hyperactivity well on that they are hoping to get the same effect as he had prior-I discussed with him that he does not have the same brain chemistry status post stroke.  There expressing understanding with the plan they have continue to follow with speech therapy at [facility] for his peer word deafness but they would like to do a trial medication they are awaiting appointment with psychiatry we discussed the risk benefits and possible side effects of medication with him today and would like to do a trial of low dose of paroxetine.  They will contact me with any concerns.  Patient is largely perseverating on the fact that he thinks that people are trying to take the custody of his children away from him-I do not think it safe for him to be driving his children around at this time and I think it is good that he has help with this this is very frustrating to him and he feels as though his rights are being taken away.  I do not think his judgment is back to his baseline and I am in agreement with his wife that I will be recommending supervision when he is with the children-he states he had clearance to drive from his neurologist previously which his wife does not think is accurate-he should have paperwork documenting this  if this is accurate which his wife will check on when they get home    ROS: negative except as per HPI    OBJECTIVE:   The patient appears well, alert, oriented x 3, in no distress.  /78 (Patient Site: Right Arm, Patient Position: Sitting, Cuff Size: Adult Regular)  Pulse 70  Temp 98  F (36.7  C) (Oral)   Resp 16  Wt 165 lb 9 oz (75.1 kg)  BMI 20.69 kg/m2      Lungs: clear, good air entry, no wheezes, rhonchi or rales.   Cardiac: S1 and S2 normal, no murmurs, regular rate and rhythm.    Extremities: show no edema, normal peripheral pulses.   Skin: clear, dry, no rashes/lesions  Psych-anxious at times, then irritated as well      Pt states an understanding and agrees to the above plan.  Greater than 25 minutes was spent today in interview and examination with Zhen Sherman with more than 50% of that time in counseling and coordination of care.

## 2021-06-19 NOTE — PROGRESS NOTES
ASSESSMENT/PLAN  1. History of stroke  Patient is having improvement in his speech is still having marked abnormalities and we does get slightly anxious they are hastened  Discussed with him I think he should continue with his current therapy and I think continue to show some improvement    2. Major depression, recurrent (H)  Patient has doubled his paroxetine is been taking 20 mg he has an appointment with neurological psych testing next week and a psychologist and psychiatrist in the future  We will refill his paroxetine at 20 mg  With his labile mood and anxiousness I do still recommend to him that he should not be left alone with the children  His wife was present discussed with him as well that the children have been scared in the alteration of how is been acting  Patient does not feel is been acting differently he is willing to proceed with neuropsychological testing and psychiatry try to continue to get better    3. Anxiety  Patient's anxiety is worse and he is having trouble understanding his children are finding concerned with his change in mentation and personality  His wife was present today helps provide some history as well and some insight as unfortunately think Zhen insight for his own mental health at this point is poor  Do think he will benefit from psychology and neurological testing  We discussed with him and his wife present writing more things down at this time to help Zhen        SUBJECTIVE:   Chief Complaint   Patient presents with     Follow-up     Follow up after stroke, discuss mood, and medication check      Zhen Sherman 57 y.o. male    Current Outpatient Prescriptions   Medication Sig Dispense Refill     aspirin 81 mg chewable tablet Chew 1 tablet (81 mg total) at bedtime.  0     simvastatin (ZOCOR) 20 MG tablet Take 1 tablet (20 mg total) by mouth daily. 90 tablet 3     traZODone (DESYREL) 50 MG tablet Take 1 tablet (50 mg total) by mouth at bedtime. 90 tablet 1     PARoxetine  (PAXIL) 20 MG tablet Take 1 tablet (20 mg total) by mouth every morning. 30 tablet 5     No current facility-administered medications for this visit.      Allergies: Pollen extracts   No LMP for male patient.    HPI:   Patient presents here for follow-up regards to his stroke and change in mood  He has plans for neuropsychological testing next week and meet with a psychologist and then a psychiatrist in the near future  He has noted some improvement of his mood he states with the paroxetine but is increased up to 20 mg  We will send refills at that dosing  He continues use trazodone for sleep at night  He is initially met with by himself and then his wife is called in the room later on  He does have increasing agitation when she is in the room but he wants to be present to provide both sides of the story  When he gets irritated he does have more speech abnormalities but in regards to previous he has had huge improvements in his speech he is benefiting from speech therapy at this time  His wife states he still is very labile and has mood swings and the children are scared around him at times due to the fact that he has personality changes  He sees kids during the day quite often but his wife likes him to be seen with someone else such as a nephew or herself  She states that her kids are not comfortable and they have been scared with some of the personality changes  Discussed with him and his wife present I do think it is better for him not to be alone with the children at this time as he continues to heal  I do think it is find a season during the day and thinks important for his healing process I do agree with the patient  But due to his lability and change in personality and recent stroke with a he needs to continue to heal prior to being left alone with the children    Concerning is his lack of insight especially with recent hospitalization with suicidal ideation  Reviewed notes from [facility]  He states  "this is minor which we discussed is not  He states he never talk about things like that in front of his children  Discussed with him I think that he would benefit from a psychologist and psychiatry appointment or come in the near future which he feels are only pharmacy pushers  He is agreement to continue on with his paroxetine 20 mg daily  He denies any current suicidal thoughts or plans  He states repeatedly how he does understand how he is and he will likely not allowed to be around his children  Discussed that he is spending time with them on a regular basis but he does not like how he is not left alone with them he does not understand why he cannot sleep in the house at night with them  Discussed with them that his wife also shared with me that the children are scared around him at this time and they do not understand the personality changes- which the patient himself states that he just does not understand    Patient is agreement to continue with current medications and follow-up with psychiatry as scheduled    ROS: negative except as per HPI    OBJECTIVE:   The patient appears well, alert, oriented x 3, in no distress.  /86 (Patient Site: Left Arm, Patient Position: Sitting, Cuff Size: Adult Regular)  Pulse 69  Temp 97.7  F (36.5  C) (Oral)   Resp 16  Ht 6' 3\" (1.905 m)  Wt 164 lb 6.4 oz (74.6 kg)  BMI 20.55 kg/m2    Lungs: clear, good air entry, no wheezes, rhonchi or rales.   Cardiac: S1 and S2 normal, no murmurs, regular rate and rhythm.   Abdomen: normal bowel sounds, soft   Extremities: show no edema, normal peripheral pulses.   Neurological: word jumbling, some expressive aphasia but much improved speech from prior  Skin: clear, dry, no rashes/lesions  Psych- Pt perseverates on the inability to recover from his stroke while dealing both with separation from his wife and the stroke-he does get more anxious and slightly irritable when discussing-when reviewing his recent ER visit and some " issues and let up to it he said those are minor      Pt states an understanding and agrees to the above plan.  Greater than 25 minutes was spent today in interview and examination with Zhen Sherman with more than 50% of that time in counseling and coordination of care.

## 2021-06-20 NOTE — PROGRESS NOTES
ASSESSMENT/PLAN  1. History of TIA (transient ischemic attack) and stroke  Patient is status post stroke  He has discontinued his cholesterol medication secondary the fact he does not believe he needs it  We discussed with him again the theory behind being on a cholesterol-lowering medication status post stroke  He does not feel he needs to back on this medication    2. Moderate episode of recurrent major depressive disorder (H)  Largest portion of patient's conversation today around perseverating around how he feels that no psychiatrist can help him as a states that they are unable to help him  He perseverates on the fact that he is unable to heel as he is not allowed to be unsupervised with his children  He also feels it without being able live in his home he is unable to heel  Discussed with him at this time that he needs to continue to strive to get better at this time so his children are comfortable being around him so he can see them unsupervised  Expressed with him again the fact that before his stroke he was already  with his wife  They are working to get him in with a new psychiatrist next week that specializes in autism and Asperger's  At this time besides going back on his simvastatin I would recommend him maintaining on 20 mg of paroxetine  Discussed with him the ability to use 1-1/2-2 tabs of trazodone if he is having difficulty with sleep        SUBJECTIVE:   Chief Complaint   Patient presents with     Follow-up     Zhen Sherman 58 y.o. male    Current Outpatient Prescriptions   Medication Sig Dispense Refill     aspirin 81 mg chewable tablet Chew 1 tablet (81 mg total) at bedtime.  0     PARoxetine (PAXIL) 20 MG tablet Take 1 tablet (20 mg total) by mouth every morning. 30 tablet 5     simvastatin (ZOCOR) 20 MG tablet Take 1 tablet (20 mg total) by mouth daily. 90 tablet 3     traZODone (DESYREL) 50 MG tablet Take 1.5 tablets (75 mg total) by mouth at bedtime. 135 tablet 1     No current  facility-administered medications for this visit.      Allergies: Pollen extracts   No LMP for male patient.    HPI:   Patient is here for follow-up regards to depression and status post stroke.  Prior to stroke patient was a high functioning  with Asperger's and has been able to work status post a stroke.  The patient had been  from his wife prior to the injury and is having marked functional concerns due to the fact that he does not feel that he can heal mentally without being at his home and being able to be unsupervised with his children.  The patient's children do not feel comfortable being around him at this time and his wife presents with him today discussing their ongoing concerns.  They are trying to meet with a new psychiatrist who can help him hopefully more with some of the heightened as per her symptoms that has been experiencing status post a stroke-he does not feel that anything is going well for him he states is unable to work he is unable to be in his own home or be unsupervised with his children-we discussed with him he has made marked strides since his stroke and is doing much better in regards to being able to communicate and talk and read which is not a large victory for him from his perspective.  Discussed with him clearly that he needs to continue to work on his communication and getting back to his baseline further even be a possibility of him to be unsupervised with his children.  He plans on meeting with a new psychiatrist next week.  He uses trazodone at night to sleep we discussed them he can safely take 1-2 tablets of this.  Discussed the importance of continuing with simvastatin status post stroke.  Patient has poor insight into his functional change and personality changes status post stroke and will need to make marked improvements and thus I suspect if there is going to be a chance that he can return to work or get the comfort and confidence back from his children  for them to feel comfortable with him without supervision.    Patient will follow with psychiatry.    ROS: negative except as per HPI    OBJECTIVE:   The patient appears well, alert, oriented x 3, in no distress.  /84 (Patient Site: Left Arm, Patient Position: Sitting, Cuff Size: Adult Regular)  Pulse 63  Temp 96.8  F (36  C) (Oral)   Resp 16  Wt 164 lb 9.6 oz (74.7 kg)  BMI 20.57 kg/m2    Lungs: clear, good air entry, no wheezes, rhonchi or rales.   Cardiac: S1 and S2 normal, no murmurs, regular rate and rhythm.   Neurological: normal, no focal findings.  Skin: clear, dry, no rashes/lesions  Psych- normal mood and affect      Pt states an understanding and agrees to the above plan.  Greater than 25 minutes was spent today in interview and examination with Zhen Sherman with more than 50% of that time in counseling and coordination of care.

## 2021-06-24 NOTE — TELEPHONE ENCOUNTER
Refill Approved    Rx renewed per Medication Renewal Policy. Medication was last renewed on 8/7/18.    Ruma Carpenter, [facility] Triage/Med Refill 2/12/2019     Requested Prescriptions   Pending Prescriptions Disp Refills     PARoxetine (PAXIL) 20 MG tablet [Pharmacy Med Name: PAROXETINE 20MG TABLETS] 30 tablet 0     Sig: TAKE 1 TABLET BY MOUTH EVERY MORNING    [facility] Refill Protocol  Passed - 2/11/2019  9:25 AM       Passed - PCP or prescribing provider visit in last year    Last office visit with prescriber/PCP: 10/9/2018 Huy Bruce MD OR same dept: 10/9/2018 Huy Bruce MD OR same specialty: 10/9/2018 Huy Bruce MD  Last physical: Visit date not found Last MTM visit: Visit date not found   Next visit within 3 mo: Visit date not found  Next physical within 3 mo: Visit date not found  Prescriber OR PCP: Huy Bruce MD  Last diagnosis associated with med order: There are no diagnoses linked to this encounter.  If protocol passes may refill for 12 months if within 3 months of last provider visit (or a total of 15 months).

## 2021-06-25 NOTE — TELEPHONE ENCOUNTER
RN cannot approve Refill Request    RN can NOT refill this medication Protocol failed and NO refill given. Last office visit: 3/20/2019 Huy Bruce MD Last Physical: Visit date not found Last MTM visit: Visit date not found Last visit same specialty: 3/20/2019 Huy Bruce MD.  Next visit within 3 mo: Visit date not found  Next physical within 3 mo: Visit date not found      Taya Clark, [facility] Triage/Med Refill 6/12/2021    Requested Prescriptions   Pending Prescriptions Disp Refills     PARoxetine (PAXIL) 20 MG tablet [Pharmacy Med Name: PAROXETINE 20MG TABLETS] 30 tablet 1     Sig: TAKE 1 TABLET BY MOUTH EVERY MORNING       [facility] Refill Protocol  Failed - 6/12/2021  1:55 PM        Failed - PCP or prescribing provider visit in last year     Last office visit with prescriber/PCP: 3/20/2019 Huy Bruce MD OR same dept: Visit date not found OR same specialty: 3/20/2019 Huy Bruce MD  Last physical: Visit date not found Last MTM visit: Visit date not found   Next visit within 3 mo: Visit date not found  Next physical within 3 mo: Visit date not found  Prescriber OR PCP: Huy Bruce MD  Last diagnosis associated with med order: 1. Moderate episode of recurrent major depressive disorder (H)  - PARoxetine (PAXIL) 20 MG tablet [Pharmacy Med Name: PAROXETINE 20MG TABLETS]; TAKE 1 TABLET BY MOUTH EVERY MORNING  Dispense: 30 tablet; Refill: 1    If protocol passes may refill for 12 months if within 3 months of last provider visit (or a total of 15 months).

## 2021-06-25 NOTE — PROGRESS NOTES
ASSESSMENT/PLAN  1. Moderate episode of recurrent major depressive disorder (H)  Patient is here for follow-up  Continue with paroxetine at this time and I recommend continuing on this medication  Consideration for increasing this in the future if symptoms are worsening  He is under considerable stress at this time with no job and going through a divorce    2. 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 Insomnia, unspecified type  Patient continues to use trazodone at night to help him sleep  With the stressors of his divorce and ongoing concerns with no employment and slow progression of his speech and cognition abnormality with the pure deafness he continues to have insomnia issues        SUBJECTIVE:   Chief Complaint   Patient presents with     Follow-up     Trazodone is working well, patient taking 50 mg     Zhen Sherman 58 y.o. male    Current Outpatient Medications   Medication Sig Dispense Refill     aspirin 81 mg chewable tablet Chew 1 tablet (81 mg total) at bedtime.  0     baclofen (LIORESAL) 10 MG tablet TK 1 TO 2 TS PO HS PRF MSP  0     PARoxetine (PAXIL) 20 MG tablet TAKE 1 TABLET BY MOUTH EVERY MORNING 30 tablet 6     simvastatin (ZOCOR) 20 MG tablet Take 1 tablet (20 mg total) by mouth daily. 90 tablet 3     traZODone (DESYREL) 50 MG tablet Take 1.5 tablets (75 mg total) by mouth at bedtime. 135 tablet 1     No current facility-administered medications for this visit.      Allergies: Pollen extracts   No LMP for male patient.    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  He continues to struggle with words and sentence formation  He continues with therapy at this  time but he is unable to return to work he has a disability paperwork to be filled out which will send to his insurance group  Patient is very frustrated at this time with a slowness of his progress  He is also going through a divorce right now and sounds as though next week might be finalized  He speaks of trying to keep himself busy with volunteering as he drives himself nutty he stated his apartment -so is trying to keep active outside the home when he can  He continues to use paroxetine-he feels medication is working fine although he is quite tearful when discussing his stressors that he has at this time  We discussed possibly increasing the dosing of this medication but he like to stay on the same dosage  He continues to use trazodone at night for insomnia issues  Physically he seems to be doing well but mentally he would be unable to work at this time  Unless there is progression of his word forming difficulties he is not going to be able to sustain employment    ROS: negative except as per HPI    OBJECTIVE:   The patient appears well, alert, oriented x 3, in no distress.  /80 (Patient Site: Left Arm, Patient Position: Sitting, Cuff Size: Adult Regular)   Pulse 82   Temp 98.1  F (36.7  C) (Oral)   Resp 16   Wt 162 lb 9.6 oz (73.8 kg)   BMI 20.32 kg/m      Lungs: clear, good air entry, no wheezes, rhonchi or rales.   Cardiac: S1 and S2 normal, no murmurs, regular rate and rhythm.   Abdomen: normal bowel sounds, soft   Extremities: show no edema, normal peripheral pulses.   Neurological: word jumbling, difficultly with sentences  Skin: clear, dry, no rashes/lesions  Psych- tearful when speaking of his upcoming divorce and kids      Pt states an understanding and agrees to the above plan.  Greater than 25 minutes was spent today in interview and examination with Zhen Sherman with more than 50% of that time in counseling and coordination of care.

## 2021-06-26 NOTE — PROGRESS NOTES
Progress Notes by Buck Pimentel MD at 4/8/2018  8:01 AM     Author: Buck Pimentel MD Service: Neurology Author Type: Physician    Filed: 4/8/2018  9:59 AM Date of Service: 4/8/2018  8:01 AM Status: Signed    : Buck Pimentel MD (Physician)       NEUROLOGY   INPATIENT F/U NOTE       1. Assessment/Plan for Hospital Day: 2     Acute left hemispheric ischemic stroke with high risk of developing malignant cerebral brain edema     clinical presentation: Aphasia,  hemianopia to the right and new pronator drift     Imaging studies: Extensive cortical ischemic left MCA stroke on CT head with left ICA occlusion and M2 occlusion the left    Repeat CT today prelim stable    Hypercoagulable workup pending    Echocardiogram pending     Recommendations  1. Advance diet after swallow eval  2. 81 mg aspirin, risk and benefit versus stroke prevention and pending craniotomy discussed with family and nursing staff  3. Statin once patient is allowed to swallow  4.  MRI brain   5. Keep blood pressure into permissive hypertension range 200/110 are allowed  6. DVT preventive  7. Speech and swallow evaluation  8. PT/OT       1. Neurological Barriers to D/C: Stroke  2. Anticipated D/C: 4-6 days  3. Neurological Disposition: TCU/acute rehab unit    2. SUBJECTIVE     CC: Yoel Sherman is a 57 y.o. male seen for a stroke.    4/8/18 this patient had overnight no new events.  He was allowed to go to the bathroom with assistance.  He has still profound aphasia mostly vertical style was very fluent language but no real understanding.  He does not have any full pneumatic or semantic paraphasias.  On exam today he has a slight right pronator drift.    Wife state overnight.  He seems to have a headache.  The 3% normal saline makes this most dry.  4/7/18 Patient was on apparently in his usual state of health.  He was yesterday on the phone somewhat odd the wife said.  She was not sure if there was anything wrong with him or not.   Today he went to his work.  The coworkers thought that he was very quiet and did not talk much.  He seems to move normally and was eating doughnut with a coworker but was unusually quiet.  Today then around 1030 the patient went to his former wife to  the children when she noticed that he was somewhat confused with his language.  He was tired and she asked him to lie down.  He slept for 2 hours and came back and was still fairly confused with his language.  He was brought into the emergency room.  He was seen by Dr. Walsh.  He was noted patient was somewhat confused and had an altered language presentation.  Sometimes he was understanding things at other times he had nonsensical speech.  He was not really weak but very agitated and anxious as well as again had language concerns  It was found out that he had a large left MCA stroke.  He had also left ICA occlusion with left M2 occlusions.  He was not a candidate for TPA or interventional procedures due to the unclear time of onset as well as also the demarcation of the stroke and the risk of reperfusion. Patient was transferred urgently to the neuro critical care unit here at [facility] I discussed the case then with ICU, neurosurgery, nursing staff, wife and patient. I informed the wife that the patient has a high risk of malignant swelling of the stroke. I discussed with her that we will start him normal saline as well as conservative measurements to avoid the swelling of the stroke.  We are monitoring if any neurological change occur so that we have to obtain a CT scan and potentially take him down for craniotomy.     Patient Active Problem List    Diagnosis Date Noted   ? Acute ischemic left MCA stroke 04/07/2018   ? Flank pain, acute 03/06/2017       No past surgical history on file.    No family history on file.    Social History     Social History   ? Marital status:      Spouse name: N/A   ? Number of children: N/A   ? Years of  education: N/A     Occupational History   ? Not on file.     Social History Main Topics   ? Smoking status: Never Smoker   ? Smokeless tobacco: Not on file   ? Alcohol use Not on file   ? Drug use: Not on file   ? Sexual activity: Not on file     Other Topics Concern   ? Not on file     Social History Narrative       No Known Allergies      3. SCHEDULED MEDICATIONS     ? acetaminophen  1,000 mg Intravenous Q6H   ? aspirin  81 mg Oral QHS    Or   ? aspirin  300 mg Rectal QHS   ? heparin (PF)  5,000 Units Subcutaneous Q12H 09-21   ? polyethylene glycol  17 g Oral DAILY   ? senna-docusate  1 tablet Oral BID   ? simvastatin  20 mg Oral DAILY   ? sodium chloride  10-30 mL Intravenous Q8H FIXED TIMES   ? sodium chloride  2 g Oral BID    Or   ? sodium chloride  2 g Enteral Tube BID       4. INFUSIONS     ? sodium chloride 0.9 % with KCl 20 mEq 100 mL/hr (04/08/18 0550)   ? sodium chloride 3 % 50 mL/hr (04/08/18 0635)       5. PRN MEDICATIONS   acetaminophen **OR** acetaminophen **OR** acetaminophen, bisacodyl, labetalol, magnesium hydroxide, ondansetron **OR** ondansetron, sodium chloride, sodium chloride, sodium chloride    6. ROS   Review of systems not obtained due to inability to communicate with the patient.     7. PHYSICAL EXAMINATION   Vital signs in last 24 hours:  Vitals:    04/08/18 0515 04/08/18 0530 04/08/18 0545 04/08/18 0600   BP: 124/62 147/77 125/68 136/83   Pulse: 76 77 78 84   Resp: 15 8 16 18   Temp:       SpO2:       Weight:       Height:         I/O last 3 completed shifts:  In: 1711 [I.V.:1511; IV Piggyback:200]  Out: -           Neurological Exam:     NIH Stroke Scale    Date of exam 4/8/2018  Time of Exam 7:30 AM    1a Level of consciousness:  0=alert; keenly responsive   1b. LOC questions: 2=Performs neither task correctly   1c. LOC commands: 2=Performs neither task correctly   2. Best Gaze: 0=normal   3.Visual: 2=Complete hemianopia   4. Facial Palsy: 0=Normal symmetric movement   5a. Motor left  arm: 0=No drift, limb holds 90 (or 45) degrees for full 10 seconds   5b. Motor right arm: 1= vdrift, limb holds 90 (or 45) degrees for full 10 seconds   6a. motor left le=No drift, limb holds 90 (or 45) degrees for full 10 seconds   6b Motor right le=No drift, limb holds 90 (or 45) degrees for full 10 seconds   7. Limb Ataxia: 0=Absent   8. Sensory: 0=Normal; no sensory loss   9. Best Language: 3=Mute, global aphasia; no usable speech or auditory comprehension   10. Dysarthria: 0=Normal   11. Extinction andInattention: 0=No abnormality       NIH Stroke Scale Total:  10         Ngoc Coma Score  Eye openin - Opens eyes on own   Verbal:  3 - Talks, but nonsensical   Motor:  5 - Pushes away noxious stimulus   GCS Total: 12       Mental status:   Alert,   Language is fluent.  He is somewhat puzzled.  He tries to talk to his wife to have her answer the questions.  He has echolalia was repeating words that he hears a.  He does not really follow any commands.  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  He does not have any staccato in his speech.    Cranial nerves:   I: smell    Not tested  II: fundoscopy   wnl  II: visual fields    Full to confrontation  II: pupils    Equal, round, reactive to light  III,VII: ptosis    None  III,IV,VI: extraocular muscles  Full ROM  V: mastication    Normal  V: facial light touch sensation  unclear  V,VII: corneal reflex    Present  VII: facial muscle function - upper  Normal  VII: facial muscle function - lower Normal  VIII: hearing    Not tested  IX: soft palate elevation   Normal  IX,X: gag reflex   unclear  XI: trapezius strength   5/5  XI: sternocleidomastoid strength 5/5  XI: neck flexion strength   5/5  XII: tongue strength    Normal  Sensory exam   Motor exam  pronator drift  right face  normal  Right Face 5/5   left face normal  Left face 5/5   RUE    normal  RUE  5/5 ++  LUE  normal  LUE  5/5 -  RLE  normal  RLE  5/5   LLE  normal  LLE  5/5   Reflexes    right left  biceps reflex (C-5 to C-6)   2/4 2/4  triceps reflex (C-7 to C-8)   2/4 2/4  quadriceps reflex (L-2 to L-4)  2/4 2/4  Achilles reflex (L-5 to S-2)  2/4 2/4  Babinski    0 0  Coordination:   Romberg test normal   def  Finger-Nose-Finger testing  copies my movements correctly  Rapid Alternating Movements  copies my movements correctly  Gait: def  Muscle tone: normal, without rigidity or spasticity, no tremor    MODIFIED BLANCA SCALE (MRS) 4  0 No symptoms at all   1 No significant disability despite symptoms; able to carry out all usual duties and activities   2 Slight disability; unable to carry out all previous activities, but able to look after own affairs  without assistance   3 Moderate disability; requiring some help, but able to walk without assistance   4 Moderately severe disability; unable to walk without assistance and unable to attend to own bodily needs without assistance   5 Severe disability; bedridden, incontinent and requiring constant nursing care and attention   6 Dead     8. RESULTS REVIEW       8.1 Imaging studies   All studies were personally reviewed        CT BRAIN WITHOUT CONTRAST; CTA [place] OF [place] AND NECK WITH CONTRAST    4/7/2018 1:55 PM     INDICATION: Confusion/delirium. Altered level of consciousness. Stroke code.   TECHNIQUE: Contiguous serial axial images were obtained through the brain without contrast. Upon administration of IV contrast, contiguous serial axial images of the [place] of [place] and neck were obtained using thin-section high-resolution CT   angiographic technique with image acquisition during the arterial phase. Post processing performed with generation of 3D MIP angiographic reconstructions. Dose reduction techniques were used.   CONTRAST: 100 mL of Omnipaque 350 intravenous contrast.  COMPARISON: MRI brain 1/19/2017.      FINDINGS:   CT BRAIN  WITHOUT CONTRAST: There is a large area of low-attenuation change involving the lateral aspect of essentially the entire left temporal lobe and to a lesser extent lateral left parietal lobe, compatible with an area of acute to subacute ischemia.   The area measures at least 8.6 cm in AP dimension, up to 4.1 cm in craniocaudal dimension along the lateral temporal lobe, and at least 2.5 cm in thickness along the anterior left temporal lobe. No finding for hemorrhagic transformation. Small   hyperdensity within the anterior left sylvian fissure (axial image 13) is compatible with MCA thrombus.     Otherwise normal gray-white matter differentiation. Although there is left-sided localized sulcal effacement, no midline shift.     CTA [place] OF [place]: There is occlusion of the petrous left ICA with small-caliber more distal left ICA, presumably reflecting retrograde filling from the right A1 segment and left posterior communicating artery. Both anterior cerebral arteries and more   distal anterior cerebral arteries appear normal. There is occlusion of a proximal M2 branch of the left MCA (axial source image 453). This corresponds to the hyperdense vessel sign. Both posterior communicating arteries are patent. Posterior circulation   otherwise unremarkable.     Calvarium is intact, without suspicious lytic or blastic foci. Moderate ethmoid and left frontal sinus mucosal thickening. Moderate bilateral maxillary sinus mucosal thickening with dependent maxillary sinus air-fluid levels, compatible with active sinus   disease. Mild left sphenoid sinus mucosal thickening. Middle ear cavities and mastoid air cells are clear.     Orbits are unremarkable.     CTA OF THE NECK: Visualized aortic arch is unremarkable. There is occlusion of the left ICA just distal to its origin without enhancement seen more distally within the left cervical ICA.     No significant stenosis right carotid system by modified NASCET criteria. Normal  appearance of both cervical vertebral arteries without findings for dissection or significant stenosis.     Lung apices are clear. Osseous structures are without suspicious lytic or blastic foci.     IMPRESSION:   CT BRAIN WITHOUT CONTRAST:   1.  Large area of low-attenuation change is involving what appears to be the entirety of the lateral aspect of the left temporal lobe extending to the left parietooccipital region and compatible with an area of acute to subacute ischemia. No findings for   hemorrhagic transformation.   2.  Hyperdense left MCA sign.   3.  No finding for intracranial hemorrhage or mass.   4.  Extensive paranasal sinus mucosal thickening is noted with dependent air-fluid levels, compatible with active sinus disease.     CTA [place] OF [place]:   1.  Occlusion of the petrous left ICA, retrograde filling of the cavernous and more distal segments on the left.   2.  Occlusion of a proximal M2 branch of the left MCA.   3.  The anterior communicating artery, both A1 segments, and both posterior communicating arteries are patent.     CTA OF THE NECK:  1.  Occlusion of the left ICA beginning just distal to its origin and extending throughout its cervical extent on the left.   2.  No significant stenosis right carotid system or either cervical vertebral system.          CT 4/8/18          8.2 Laboratory testing        CBC:   Lab Results   Component Value Date    WBC 10.5 04/07/2018    RBC 5.07 04/07/2018        Lab Results   Component Value Date    ALT 22 05/12/2016    AST 22 05/12/2016    ALKPHOS 96 05/12/2016    BILITOT 0.5 05/12/2016       Results from last 7 days  Lab Units 04/08/18  0603 04/07/18  2335 04/07/18  1348   LN-SODIUM mmol/L 143 143 140   LN-POTASSIUM mmol/L  --   --  3.6   LN-CHLORIDE mmol/L  --   --  102   LN-CO2 mmol/L  --   --  27   LN-BLOOD UREA NITROGEN mg/dL  --   --  7*   LN-CREATININE mg/dL  --   --  0.80   LN-CALCIUM mg/dL  --   --  9.9         Lab Results   Component Value Date     TSH 1.21 05/12/2016     Time spent on counseling/coordination of care: 1 Hours. More than 50% of this time during the visit for Zhen Sherman was devoted to counseling and coordination of care, including face to face time with the patient, time spent reviewing data, obtaining patient information, and discussing the case with other health care providers. I discussed time was spent on and off critical care floor time  from 7:30AM  to 10 AM managing stroke, care management, ICU issues,         Buck Pimentel MD, PhD  Neurology & Sleep Medicine

## 2021-07-01 NOTE — PROCEDURES
"Procedures by Cornelio Franco RN at 4/7/2018  6:36 PM     Author: Cornelio Franco RN Service: -- Author Type: Registered Nurse    Filed: 4/7/2018  6:38 PM Date of Service: 4/7/2018  6:36 PM Status: Signed    : Cornelio Franco RN (Registered Nurse)       PICC Line Insertion Procedure Note  Pt. Name: Zhen Sherman  MRN:        [id number]    Procedure: Insertion of a  triple Lumen  5 fr  Bard SOLO (valved) Power PICC, Lot number [id number]    Indications: 3% NAcl    Contraindications : none    Procedure Details   Patient identified with 2 identifiers and \"Time Out\" conducted.  .     Central line insertion bundle followed: hand hygeine performed prior to procedure, site cleansed with cholraprep, hat, mask, sterile gloves,sterile gown worn, patient draped with maximum barrier head to toe drape, sterile field maintained.    The vein was assessed and found to be compressible and of adequate size. 1 ml 1% Lidocaine administered sq to the insertion site. A 5 Fr PICC was inserted into the brachial vein of the right arm with ultrasound guidance. 1 attempt(s) required to access vein.   Catheter threaded without difficulty. Good blood return noted.    Modified Seldinger Technique used for insertion.    The 8 sharps that are included in the PICC insertion kit were accounted for and disposed of in the sharps container prior to breakdown of the sterile field.    Catheter secured with Statlock, biopatch and Tegaderm dressing applied.    Findings:  Total catheter length  43 cm, with 4 cm exposed. Mid upper arm circumference is 29 cm. Catheter was flushed with 30 cc NS. Patient  tolerated procedure well.    Tip placement verified by 3CG technology.    CLABSI prevention brochure left at bedside.    Patient's primary RN notified PICC is ready for use.    Comments:          Cornelio Franco RN    [facility]  [phone]           "

## 2021-07-14 PROBLEM — I63.9 ACUTE ISCHEMIC STROKE (H): Status: RESOLVED | Noted: 2018-04-08 | Resolved: 2018-04-10

## 2021-08-14 ENCOUNTER — HEALTH MAINTENANCE LETTER (OUTPATIENT)
Age: 61
End: 2021-08-14

## 2021-09-01 ENCOUNTER — NURSE TRIAGE (OUTPATIENT)
Dept: NURSING | Facility: CLINIC | Age: 61
End: 2021-09-01

## 2021-09-01 NOTE — TELEPHONE ENCOUNTER
Triage call  Patient called to report when he goes to the bathroom  to the bathroom blood in urine  Pink tinged today and another time last week.  He denies eating beets or anything with red food coloring.  States that he is due for a physical last office visit was 3/20/2019. Denies frequency or burning with urination.  difficulty starting stream at times.    Per protocol see in office today or tomorrow. Care advice given.  Verbalizes understanding and agrees with plan. Transferred to scheduling    COVID 19 Nurse Triage Plan/Patient Instructions    Please be aware that novel coronavirus (COVID-19) may be circulating in the community. If you develop symptoms such as fever, cough, or SOB or if you have concerns about the presence of another infection including coronavirus (COVID-19), please contact your health care provider or visit [email or web address].     Disposition/Instructions    In-Person Visit with provider recommended. Reference Visit Selection Guide.    Thank you for taking steps to prevent the spread of this virus.  o Limit your contact with others.  o Wear a simple mask to cover your cough.  o Wash your hands well and often.    Resources    [facility]: About COVID-19: [email or web address]    CDC: What to Do If You're Sick: www.cdc.gov/coronavirus/2019-ncov/about/steps-when-sick.html    CDC: Ending Home Isolation: www.cdc.gov/coronavirus/2019-ncov/hcp/disposition-in-home-patients.html     CDC: Caring for Someone: www.cdc.gov/coronavirus/2019-ncov/if-you-are-sick/care-for-someone.html     [facility]: Interim Guidance for Hospital Discharge to Home: www.health.state.[place]/diseases/coronavirus/hcp/hospdischarge.pdf    [facility] clinical trials (COVID-19 research studies): [email or web address]     Below are the COVID-19 hotlines at the [facility] of [facility] ([facility]). Interpreters are available.   o For health questions: Call 332-604-4601 or  1-146.806.6272 (7 a.m. to 7 p.m.)  o For questions about schools and childcare: Call 195-679-3913 or 1-507.529.9480 (7 a.m. to 7 p.m.)     Reason for Disposition    All other patients with blood in urine (Exception: could be normal menstrual bleeding)    Additional Information    Negative: Shock suspected (e.g., cold/pale/clammy skin, too weak to stand, low BP, rapid pulse)    Negative: Sounds like a life-threatening emergency to the triager    Negative: Urinary catheter, questions about    Negative: Recent back or abdominal injury    Negative: Recent genital injury    Negative: Unable to urinate (or only a few drops) > 4 hours and bladder feels very full (e.g., palpable bladder or strong urge to urinate)    Negative: Passing pure blood or large blood clots (i.e., larger than a dime or grape)    Negative: Fever > 100.4 F (38.0 C)    Negative: Patient sounds very sick or weak to the triager    Negative: Known sickle cell disease    Negative: Taking Coumadin (warfarin) or other strong blood thinner, or known bleeding disorder (e.g., thrombocytopenia)    Negative: Side (flank) or back pain present    Negative: Pain or burning with passing urine (urination)    Negative: Patient wants to be seen    Negative: Pink or red-colored urine and likely from food (beets, rhubarb, red food dye) and lasts > 24 hours after stopping food    Protocols used: URINE - BLOOD IN-A-OH

## 2021-09-02 ENCOUNTER — PRE VISIT (OUTPATIENT)
Dept: UROLOGY | Facility: CLINIC | Age: 61
End: 2021-09-02

## 2021-09-02 ENCOUNTER — OFFICE VISIT (OUTPATIENT)
Dept: FAMILY MEDICINE | Facility: CLINIC | Age: 61
End: 2021-09-02
Payer: MEDICARE

## 2021-09-02 VITALS
TEMPERATURE: 98.8 F | BODY MASS INDEX: 19.37 KG/M2 | OXYGEN SATURATION: 100 % | SYSTOLIC BLOOD PRESSURE: 127 MMHG | DIASTOLIC BLOOD PRESSURE: 77 MMHG | WEIGHT: 155 LBS | HEART RATE: 68 BPM

## 2021-09-02 DIAGNOSIS — R31.9 HEMATURIA, UNSPECIFIED TYPE: Primary | ICD-10-CM

## 2021-09-02 DIAGNOSIS — G47.00 INSOMNIA, UNSPECIFIED TYPE: Primary | ICD-10-CM

## 2021-09-02 LAB
ALBUMIN UR-MCNC: 30 MG/DL
APPEARANCE UR: ABNORMAL
BACTERIA #/AREA URNS HPF: ABNORMAL /HPF
BILIRUB UR QL STRIP: NEGATIVE
CAOX CRY #/AREA URNS HPF: ABNORMAL /HPF
COLOR UR AUTO: YELLOW
GLUCOSE UR STRIP-MCNC: NEGATIVE MG/DL
HGB UR QL STRIP: ABNORMAL
KETONES UR STRIP-MCNC: ABNORMAL MG/DL
LEUKOCYTE ESTERASE UR QL STRIP: NEGATIVE
NITRATE UR QL: NEGATIVE
PH UR STRIP: 5.5 [PH] (ref 5–8)
RBC #/AREA URNS AUTO: >100 /HPF
SP GR UR STRIP: 1.02 (ref 1–1.03)
SQUAMOUS #/AREA URNS AUTO: ABNORMAL /LPF
UROBILINOGEN UR STRIP-ACNC: 0.2 E.U./DL
WBC #/AREA URNS AUTO: ABNORMAL /HPF

## 2021-09-02 PROCEDURE — 99214 OFFICE O/P EST MOD 30 MIN: CPT | Performed by: PHYSICIAN ASSISTANT

## 2021-09-02 PROCEDURE — 81001 URINALYSIS AUTO W/SCOPE: CPT | Performed by: PHYSICIAN ASSISTANT

## 2021-09-02 PROCEDURE — 87086 URINE CULTURE/COLONY COUNT: CPT | Performed by: PHYSICIAN ASSISTANT

## 2021-09-02 NOTE — TELEPHONE ENCOUNTER
Reason for visit: microscopic hematuria consult      Dx/Hx/Sx: gross and microscopic hematuria, gross hematuria addressed at FP visit 9/2    Records/imaging/labs/orders: UA/UC from 9/2 in [facility]     At Rooming: video visit

## 2021-09-02 NOTE — PROGRESS NOTES
Patient presents with:  Kidney Problem: a few times has had blood in urine Pink tinged today and another time last week      Clinical Decision Making:  Patient is here for gross hematuria with 2 episodes within the last week.  Patient does not have symptoms of urinary tract infection, prostatitis or other constitutional symptoms to include fever chills night sweats or fatigue.  Patient is referred to urology for work-up of microscopic hematuria.  Russians were answered to patient's satisfaction before discharge.  Patient shares he has had his usual and customary 1 bout of nocturia per night.      ICD-10-CM    1. Hematuria, unspecified type  R31.9 UA macro with reflex to Microscopic and Culture - Clinc Collect     Urine Microscopic     Adult Urology Referral     Urine Culture Aerobic Bacterial       Patient Instructions   Follow-up with urology for work-up and evaluation of your blood in the urine.      Patient Education     Blood in Urine (Hematuria)   Blood in your urine is called hematuria. Most of the time, the cause is not serious. But you should never ignore blood in the urine. Your healthcare provider can evaluate you to find the cause of the bleeding and treat it, if needed.   Types of hematuria    Gross hematuria. This means that the blood can be seen by the naked eye. The urine may look pinkish, brownish, or bright red.    Microscopic hematuria. This means that the urine is clear, but blood cells can be seen when urine is looked at under a microscope or tested in a lab.  Both types of hematuria can have the same causes. Neither one is more serious than the other. With either type, you may not have any other symptoms at all. Or you may have other symptoms such as:     Pain, pressure, or burning when you urinate    Belly pain    Back pain  No matter how much blood is found, the cause of the bleeding needs to be identified.   What causes hematuria?  Causes of hematuria vary. They include things such as:      Injury    Strenuous exercise    Infection of the bladder, kidney, or prostate    Menstruation  Other reasons people may have blood in their urine are more serious. They include:       Blood-clotting disorders    Bladder or kidney cancer    Sickle cell disease    Inflammation of the kidney, urethra, bladder, or prostate  Many treatments are available for blood in the urine, depending on the cause.   Diagnosing hematuria  Your healthcare provider will first confirm that blood is in your urine. He or she will also take your health history and give you a physical exam. Then other tests are done to find exactly where the blood is coming from and why. Your provider will decide which tests will best figure out the cause of your hematuria. These are some common tests that may be done:     Lab tests (may include urinalysis, a urine culture, a urine cytology, and blood tests)    Cystoscopy    CT or CT urography    MRI or MR urography    Ultrasound of the kidney    Kidney biopsy  [employer] last reviewed this educational content on 6/1/2019 2000-2021 [employer]. All rights reserved. This information is not intended as a substitute for professional medical care. Always follow your healthcare professional's instructions.           Patient Education     Hematuria: Possible Causes     Many things can lead to blood in the urine (hematuria). The blood may be seen with the eye (macroscopic or gross hematuria). Or it may only be seen when the urine is looked at under a microscope (microscopic hematuria). Often no cause for the blood can be found. This is called idiopathic hematuria. Here are some of the most common causes of blood in the urine:     Kidney or bladder stones. These are collections of crystals. They form in the urine. Stones may be found anywhere in the urinary tract. But they form most often in the kidneys or bladder. In addition to blood in the urine, they can cause severe pain.    BPH (benign  prostatic hyperplasia). This is enlargement of the prostate gland. It happens as men age. BPH often causes problems with urination. It sometimes causes blood in the urine.    Urinary tract infection (UTI). This is due to bacteria growing in the urinary tract. It can cause blood in the urine. Other symptoms include burning or pain with urination. You may need to urinate often or urgently. You may also have a fever.    Damage to the urinary tract may cause blood in the urine. This damage may be due to a blow or accident. It may also result from the use of a urinary catheter. Very hard exercise may sometimes irritate the urinary tract and cause bleeding.    Cancer may occur anywhere in the urinary tract. A tumor may sometimes cause no symptoms other than bleeding.  Other possible causes of bleeding include:     Prostate gland infection (prostatitis)    Taking anticoagulants    Blockage in the urinary tract    Kidney disease or inflammation    Cystic diseases of the kidneys    Sickle cell anemia    Vigorous exercise    Endometriosis  [employer] last reviewed this educational content on 7/1/2019 2000-2021 [employer]. All rights reserved. This information is not intended as a substitute for professional medical care. Always follow your healthcare professional's instructions.           Patient Education     Blood in the Urine    Blood in the urine (hematuria) has many possible causes. If it occurs after an injury (such as a car accident or fall), it is most often a sign of bruising to the kidney or bladder. Common causes of blood in the urine include urinary tract infections, kidney stones, inflammation, tumors, or certain other diseases of the kidney or bladder. Menstruation can cause blood to appear in the urine sample, but it is not coming from the urinary tract.   If only a tiny (trace) amount of blood is present, it will show up on the urine test, even though the urine may be yellow and not pink or  red. This may occur with any of the above conditions, as well as heavy exercise or high fever. In this case, your healthcare provider may want to repeat the urine test on another day. This will show if there is still blood in the urine. If there is, then other tests can be done to find out the cause.   Home care  Follow these home care guidelines:    If your urine does not look bloody (pink, brown, or red) then you don't need to restrict your activity in any way.    If you can see blood in your urine, rest and don't do any strenuous activity until your next exam. Don't use aspirin, blood thinners, or anti-platelet or anti-inflammatory medicines. These include ibuprofen and naproxen. These thin the blood and may increase bleeding. Call your healthcare provider to talk about using these medicines.  Follow-up care  Follow up with your healthcare provider, or as advised. If you were injured and had blood in your urine, you should have a repeat urine test in 1 to 2 days. Contact your provider for this test.   A radiologist will review any X-rays that were taken. You will be told of any new findings that may affect your care.   When to seek medical advice  Call your healthcare provider right away if any of these occur:    Bright red blood or blood clots in the urine (if you did not have this before)    Weakness, dizziness or fainting    New groin, belly, or back pain    Fever of 100.4 F (38 C) or higher, or as directed by your provider    Repeated vomiting    Bleeding from the nose or gums or easy bruising  [employer] last reviewed this educational content on 9/1/2019 2000-2021 [employer]. All rights reserved. This information is not intended as a substitute for professional medical care. Always follow your healthcare professional's instructions.               HPI:  Zhen Sherman is a 60 year old male who presents today with 2 separate incidences of gross hematuria.  Patient had 2 separate incidences  within the last week.  The last time was today.  States that he has had blood throughout the entire getting middle and end of the stream.  He has not had any obstructive symptoms irritative voiding symptoms or urinary retention.  No fever chills night sweats fatigue flank or back pain constitutional symptoms to include chills night sweats or weight loss.    History obtained from chart review and the patient.    Problem List:  2018-04: Stroke (H)  2018-04: Acute ischemic stroke (H)      No past medical history on file.    Social History     Tobacco Use     Smoking status: Never Smoker     Smokeless tobacco: Never Used   Substance Use Topics     Alcohol use: Not on file       Review of Systems  As above in HPI otherwise negative.    Vitals:    09/02/21 1404   BP: 127/77   Pulse: 68   Temp: 98.8  F (37.1  C)   TempSrc: Oral   SpO2: 100%   Weight: 70.3 kg (155 lb)     General: Patient is resting comfortably no acute distress is afebrile  HEENT: Head is normocephalic atraumatic   eyes are PERRL EOMI sclera anicteric   TMs are clear bilaterally  Throat is with mild pharyngeal wall erythema and no exudate  No cervical lymphadenopathy present  LUNGS: Clear to auscultation bilaterally  HEART: Regular rate and rhythm  Abdomen: No CVA tenderness to percussion no suprapubic tenderness to palpation.  Skin: Without rash non-diaphoretic      Physical Exam    Labs:  Results for orders placed or performed in visit on 09/02/21   UA macro with reflex to Microscopic and Culture - Clinc Collect     Status: Abnormal    Specimen: Urine, Clean Catch   Result Value Ref Range    Color Urine Yellow Colorless, Straw, Light Yellow, Yellow    Appearance Urine Cloudy (A) Clear    Glucose Urine Negative Negative mg/dL    Bilirubin Urine Negative Negative    Ketones Urine Trace (A) Negative mg/dL    Specific Gravity Urine 1.025 1.005 - 1.030    Blood Urine Large (A) Negative    pH Urine 5.5 5.0 - 8.0    Protein Albumin Urine 30  (A) Negative mg/dL     Urobilinogen Urine 0.2 0.2, 1.0 E.U./dL    Nitrite Urine Negative Negative    Leukocyte Esterase Urine Negative Negative     Urine is sent for culture.    At the end of the encounter, I discussed results, diagnosis, medications. Discussed red flags for immediate return to clinic/ER, as well as indications for follow up if no improvement. Patient understood and agreed to plan. Patient was stable for discharge.

## 2021-09-02 NOTE — PATIENT INSTRUCTIONS
Follow-up with urology for work-up and evaluation of your blood in the urine.      Patient Education     Blood in Urine (Hematuria)   Blood in your urine is called hematuria. Most of the time, the cause is not serious. But you should never ignore blood in the urine. Your healthcare provider can evaluate you to find the cause of the bleeding and treat it, if needed.   Types of hematuria    Gross hematuria. This means that the blood can be seen by the naked eye. The urine may look pinkish, brownish, or bright red.    Microscopic hematuria. This means that the urine is clear, but blood cells can be seen when urine is looked at under a microscope or tested in a lab.  Both types of hematuria can have the same causes. Neither one is more serious than the other. With either type, you may not have any other symptoms at all. Or you may have other symptoms such as:     Pain, pressure, or burning when you urinate    Belly pain    Back pain  No matter how much blood is found, the cause of the bleeding needs to be identified.   What causes hematuria?  Causes of hematuria vary. They include things such as:     Injury    Strenuous exercise    Infection of the bladder, kidney, or prostate    Menstruation  Other reasons people may have blood in their urine are more serious. They include:       Blood-clotting disorders    Bladder or kidney cancer    Sickle cell disease    Inflammation of the kidney, urethra, bladder, or prostate  Many treatments are available for blood in the urine, depending on the cause.   Diagnosing hematuria  Your healthcare provider will first confirm that blood is in your urine. He or she will also take your health history and give you a physical exam. Then other tests are done to find exactly where the blood is coming from and why. Your provider will decide which tests will best figure out the cause of your hematuria. These are some common tests that may be done:     Lab tests (may include urinalysis, a urine  culture, a urine cytology, and blood tests)    Cystoscopy    CT or CT urography    MRI or MR urography    Ultrasound of the kidney    Kidney biopsy  [employer] last reviewed this educational content on 6/1/2019 2000-2021 [employer]. All rights reserved. This information is not intended as a substitute for professional medical care. Always follow your healthcare professional's instructions.           Patient Education     Hematuria: Possible Causes     Many things can lead to blood in the urine (hematuria). The blood may be seen with the eye (macroscopic or gross hematuria). Or it may only be seen when the urine is looked at under a microscope (microscopic hematuria). Often no cause for the blood can be found. This is called idiopathic hematuria. Here are some of the most common causes of blood in the urine:     Kidney or bladder stones. These are collections of crystals. They form in the urine. Stones may be found anywhere in the urinary tract. But they form most often in the kidneys or bladder. In addition to blood in the urine, they can cause severe pain.    BPH (benign prostatic hyperplasia). This is enlargement of the prostate gland. It happens as men age. BPH often causes problems with urination. It sometimes causes blood in the urine.    Urinary tract infection (UTI). This is due to bacteria growing in the urinary tract. It can cause blood in the urine. Other symptoms include burning or pain with urination. You may need to urinate often or urgently. You may also have a fever.    Damage to the urinary tract may cause blood in the urine. This damage may be due to a blow or accident. It may also result from the use of a urinary catheter. Very hard exercise may sometimes irritate the urinary tract and cause bleeding.    Cancer may occur anywhere in the urinary tract. A tumor may sometimes cause no symptoms other than bleeding.  Other possible causes of bleeding include:     Prostate gland infection  (prostatitis)    Taking anticoagulants    Blockage in the urinary tract    Kidney disease or inflammation    Cystic diseases of the kidneys    Sickle cell anemia    Vigorous exercise    Endometriosis  [employer] last reviewed this educational content on 7/1/2019 2000-2021 [employer]. All rights reserved. This information is not intended as a substitute for professional medical care. Always follow your healthcare professional's instructions.           Patient Education     Blood in the Urine    Blood in the urine (hematuria) has many possible causes. If it occurs after an injury (such as a car accident or fall), it is most often a sign of bruising to the kidney or bladder. Common causes of blood in the urine include urinary tract infections, kidney stones, inflammation, tumors, or certain other diseases of the kidney or bladder. Menstruation can cause blood to appear in the urine sample, but it is not coming from the urinary tract.   If only a tiny (trace) amount of blood is present, it will show up on the urine test, even though the urine may be yellow and not pink or red. This may occur with any of the above conditions, as well as heavy exercise or high fever. In this case, your healthcare provider may want to repeat the urine test on another day. This will show if there is still blood in the urine. If there is, then other tests can be done to find out the cause.   Home care  Follow these home care guidelines:    If your urine does not look bloody (pink, brown, or red) then you don't need to restrict your activity in any way.    If you can see blood in your urine, rest and don't do any strenuous activity until your next exam. Don't use aspirin, blood thinners, or anti-platelet or anti-inflammatory medicines. These include ibuprofen and naproxen. These thin the blood and may increase bleeding. Call your healthcare provider to talk about using these medicines.  Follow-up care  Follow up with your  healthcare provider, or as advised. If you were injured and had blood in your urine, you should have a repeat urine test in 1 to 2 days. Contact your provider for this test.   A radiologist will review any X-rays that were taken. You will be told of any new findings that may affect your care.   When to seek medical advice  Call your healthcare provider right away if any of these occur:    Bright red blood or blood clots in the urine (if you did not have this before)    Weakness, dizziness or fainting    New groin, belly, or back pain    Fever of 100.4 F (38 C) or higher, or as directed by your provider    Repeated vomiting    Bleeding from the nose or gums or easy bruising  [employer] last reviewed this educational content on 9/1/2019 2000-2021 [employer]. All rights reserved. This information is not intended as a substitute for professional medical care. Always follow your healthcare professional's instructions.

## 2021-09-03 LAB — BACTERIA UR CULT: NO GROWTH

## 2021-09-03 RX ORDER — TRAZODONE HYDROCHLORIDE 50 MG/1
TABLET, FILM COATED ORAL
Qty: 135 TABLET | Refills: 0 | Status: SHIPPED | OUTPATIENT
Start: 2021-09-03 | End: 2021-12-03

## 2021-09-08 ENCOUNTER — TELEPHONE (OUTPATIENT)
Dept: FAMILY MEDICINE | Facility: CLINIC | Age: 61
End: 2021-09-08

## 2021-09-08 ENCOUNTER — VIRTUAL VISIT (OUTPATIENT)
Dept: UROLOGY | Facility: CLINIC | Age: 61
End: 2021-09-08
Attending: PHYSICIAN ASSISTANT
Payer: MEDICARE

## 2021-09-08 DIAGNOSIS — R31.9 HEMATURIA, UNSPECIFIED TYPE: ICD-10-CM

## 2021-09-08 PROCEDURE — 99203 OFFICE O/P NEW LOW 30 MIN: CPT | Mod: 95 | Performed by: UROLOGY

## 2021-09-08 NOTE — PROGRESS NOTES
Zhen is a 61 year old who is being evaluated via a billable video visit.      How would you like to obtain your AVS? [employer]  If the video visit is dropped, the invitation should be resent by: Text to cell phone: [phone]  Will anyone else be joining your video visit? No      Video Start Time: 0910 AM  Video-Visit Details  Zhen Sherman is a 61 year old male who is being evaluated via a billable video visit.      How would you like to obtain your AVS? [employer]        Video Start Time: 910 AM        Chief Complaint:    Gross Hematuria          Consult or Referral:     Mr. Zhen Sherman is a 61 year old male evaluated at the request of Dr. Myers.           History of Present Illness:    Zhen Sherman is a very pleasant 61 year old male who presents with a history of Gross Hematuria which started 2 to 3 weeks ago.  He does not report any associated urinary symptoms.  He does not recall any family history of genitourinary tract malignancies, or personal history of recurrent UTIs, kidney stones.  He is not a smoker.     Of note, he had a stroke in 2018 resulted in aphasia with no clear motor deficit.  After intense speech rehab, aphasia has almost completely resolved.  Occasionally, he has a hard time finding words.    She has a very healthy lifestyle and exercise regularly.  ECOG 0         Past Medical History:   CVA in 2018          Past Surgical History:     Shoulder surgery          Medications     Current Outpatient Medications   Medication     aspirin 81 MG chewable tablet     senna-docusate (SENOKOT-S;PERICOLACE) 8.6-50 MG per tablet     simvastatin (ZOCOR) 20 MG tablet     traZODone (DESYREL) 50 MG tablet     No current facility-administered medications for this visit.   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[place]; work in a [employer]   Fit with healthy lifestyle   Non smoker   Social drinker   No drugs or marijuna     Social History     Socioeconomic History     Marital status:      Spouse name: Not on file     Number of children: Not on file     Years of education: Not on file     Highest education level: Not on file   Occupational History     Not on file   Tobacco Use     Smoking status: Never Smoker     Smokeless tobacco: Never Used   Substance and Sexual Activity     Alcohol use: Not on file     Drug use: Not on file     Sexual activity: Not on file   Other Topics Concern     Not on file   Social History Narrative     Not on file     Social Determinants of Health     Financial Resource Strain:      Difficulty of Paying Living Expenses:    Food Insecurity:      Worried About Running Out of Food in the Last Year:      Ran Out of Food in the Last Year:    Transportation Needs:      Lack of Transportation (Medical):      Lack of Transportation (Non-Medical):    Physical Activity:      Days of Exercise per Week:      Minutes of Exercise per Session:    Stress:      Feeling of Stress :    Social Connections:      Frequency of Communication with Friends and Family:      Frequency of Social Gatherings with Friends and Family:      Attends Synagogue Services:      Active Member of Clubs or Organizations:      Attends Club or Organization Meetings:      Marital Status:    Intimate Partner Violence:      Fear of Current or Ex-Partner:      Emotionally Abused:      Physically Abused:      Sexually Abused:             Allergies:   Pollen extracts [pollen extract]         Review of Systems:      Constitutional, HEENT, cardiovascular, pulmonary, GI, , musculoskeletal, neuro, skin, endocrine and psych systems are  negative, except as otherwise noted.         Physical Exam:     Vitals:  No vitals were obtained today due to virtual visit.    Physical Exam   GENERAL: Healthy, alert and no distress  EYES: Eyes grossly normal to inspection.  No discharge or erythema, or obvious scleral/conjunctival abnormalities.  RESP: No audible wheeze, cough, or visible cyanosis.  No visible retractions or increased work of breathing.    MS: No gross musculoskeletal defects noted.  Normal range of motion.  No visible edema.  SKIN: Visible skin clear. No significant rash, abnormal pigmentation or lesions.  NEURO: Cranial nerves grossly intact.  Mentation and speech appropriate for age.  PSYCH: Mentation appears normal, affect normal/bright, judgement and insight intact, normal speech and appearance well-groomed.      Labs and Pathology:    I reviewed all applicable laboratory and pathology data and went over findings with patient  Significant for   Lab Results   Component Value Date    CR 0.90 05/29/2018    CR 0.67 04/10/2018    CR 0.80 04/07/2018    CR 0.8 04/07/2018     Prostate Specific Antigen Screen   Date Value Ref Range Status   05/12/2016 2.6 0.00 - 3.50 ng/mL Final             Imaging:    No images are available for this visit           Outside and Past Medical records:    Review of relevant notes as documented in Epic   [facility]         Assessment and Plan:     Assessment:  Gross hematuria    We discussed different etiologies for gross hematuria including but not limited to urinary tract infection, benign prostatic hyperplasia, and more significant pathologies such as genitourinary tract malignancies. One of the etiologies that always needs to be ruled out is bladder tumor.  Standard work-up includes imaging in the form of CT urogram and office cystoscopy.    Plan:  CT urogram   In person visit for cystoscopy     Type of service:   Video Visit    Video End Time:9:33 AM    Originating Location (pt. Location): Home    Distant Location (provider location):  [facility]     Platform used for Video Visit: [employer]      30 total minutes spent on the date of the encounter including direct interaction with the patient, performing chart review, documentation and further activities as noted above.    Yani Caal MD   Urology  [facility]

## 2021-09-08 NOTE — TELEPHONE ENCOUNTER
"Just an FYI - Incoming call from patient returning a call. Relayed message to patient. Pt upset and states \"I know I havent seen Dr. Bruce for over 2 years because it's so hard to get in to see him\". Apologized to patient that provider does have a full schedule. Patient wants to see Dr. Bruce only to get paperwork filled out for [employer]. I scheduled patient for Tuesday 09/14 at 10 am. Patient is aware of appt date and time.    "

## 2021-09-08 NOTE — LETTER
9/8/2021      RE: Zhen Sherman  [address] [place] [address]  [place] [address]       Zhen is a 61 year old who is being evaluated via a billable video visit.      How would you like to obtain your AVS? [employer]  If the video visit is dropped, the invitation should be resent by: Text to cell phone: [phone]  Will anyone else be joining your video visit? No      Video Start Time: 0910 AM  Video-Visit Details  Zhen Sherman is a 61 year old male who is being evaluated via a billable video visit.      How would you like to obtain your AVS? [employer]        Video Start Time: 910 AM        Chief Complaint:    Gross Hematuria          Consult or Referral:     Mr. Zhen Sherman is a 61 year old male evaluated at the request of Dr. Myers.           History of Present Illness:    Zhen Sherman is a very pleasant 61 year old male who presents with a history of Gross Hematuria which started 2 to 3 weeks ago.  He does not report any associated urinary symptoms.  He does not recall any family history of genitourinary tract malignancies, or personal history of recurrent UTIs, kidney stones.  He is not a smoker.     Of note, he had a stroke in 2018 resulted in aphasia with no clear motor deficit.  After intense speech rehab, aphasia has almost completely resolved.  Occasionally, he has a hard time finding words.    She has a very healthy lifestyle and exercise regularly.  ECOG 0         Past Medical History:   CVA in 2018          Past Surgical History:     Shoulder surgery          Medications     Current Outpatient Medications   Medication     aspirin 81 MG chewable tablet     senna-docusate (SENOKOT-S;PERICOLACE) 8.6-50 MG per tablet     simvastatin (ZOCOR) 20 MG tablet     traZODone (DESYREL) 50 MG tablet     No current facility-administered medications for this visit.   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[employer]; work in [employer]   Fit with healthy lifestyle   Non smoker   Social drinker   No drugs or marijuna     Social History     Socioeconomic History     Marital status:      Spouse name: Not on file     Number of children: Not on file     Years of education: Not on file     Highest education level: Not on file   Occupational History     Not on file   Tobacco Use     Smoking status: Never Smoker     Smokeless tobacco: Never Used   Substance and Sexual Activity     Alcohol use: Not on file     Drug use: Not on file     Sexual activity: Not on file   Other Topics Concern     Not on file   Social History Narrative     Not on file     Social Determinants of Health     Financial Resource Strain:      Difficulty of Paying Living Expenses:    Food Insecurity:      Worried About Running Out of Food in the Last Year:      Ran Out of Food in the Last Year:    Transportation Needs:      Lack of Transportation (Medical):      Lack of Transportation (Non-Medical):    Physical Activity:      Days of Exercise per Week:      Minutes of Exercise per Session:    Stress:      Feeling of Stress :    Social Connections:      Frequency of Communication with Friends and Family:      Frequency of Social Gatherings with Friends and Family:      Attends Quaker Services:      Active Member of Clubs or Organizations:      Attends Club or Organization Meetings:      Marital Status:    Intimate Partner Violence:      Fear of Current or Ex-Partner:      Emotionally Abused:      Physically Abused:      Sexually Abused:             Allergies:   Pollen extracts [pollen extract]         Review of Systems:      Constitutional, HEENT, cardiovascular,  pulmonary, GI, , musculoskeletal, neuro, skin, endocrine and psych systems are negative, except as otherwise noted.         Physical Exam:     Vitals:  No vitals were obtained today due to virtual visit.    Physical Exam   GENERAL: Healthy, alert and no distress  EYES: Eyes grossly normal to inspection.  No discharge or erythema, or obvious scleral/conjunctival abnormalities.  RESP: No audible wheeze, cough, or visible cyanosis.  No visible retractions or increased work of breathing.    MS: No gross musculoskeletal defects noted.  Normal range of motion.  No visible edema.  SKIN: Visible skin clear. No significant rash, abnormal pigmentation or lesions.  NEURO: Cranial nerves grossly intact.  Mentation and speech appropriate for age.  PSYCH: Mentation appears normal, affect normal/bright, judgement and insight intact, normal speech and appearance well-groomed.      Labs and Pathology:    I reviewed all applicable laboratory and pathology data and went over findings with patient  Significant for   Lab Results   Component Value Date    CR 0.90 05/29/2018    CR 0.67 04/10/2018    CR 0.80 04/07/2018    CR 0.8 04/07/2018     Prostate Specific Antigen Screen   Date Value Ref Range Status   05/12/2016 2.6 0.00 - 3.50 ng/mL Final             Imaging:    No images are available for this visit           Outside and Past Medical records:    Review of relevant notes as documented in Epic   [facility]         Assessment and Plan:     Assessment:  Gross hematuria    We discussed different etiologies for gross hematuria including but not limited to urinary tract infection, benign prostatic hyperplasia, and more significant pathologies such as genitourinary tract malignancies. One of the etiologies that always needs to be ruled out is bladder tumor.  Standard work-up includes imaging in the form of CT urogram and office  cystoscopy.    Plan:  CT urogram   In person visit for cystoscopy     Type of service:  Video Visit    Video End Time:9:33 AM    Originating Location (pt. Location): Home    Distant Location (provider location):  [facility]     Platform used for Video Visit: [employer]      30 total minutes spent on the date of the encounter including direct interaction with the patient, performing chart review, documentation and further activities as noted above.    Yani Caal MD   Urology  [facility]

## 2021-09-08 NOTE — TELEPHONE ENCOUNTER
LMTCB. We received paperwork from [facility] requesting updates but pt has not seen Dr Bruce for over 2 years. Please ask pt if he is still needing paperwork filled out for [employer] and if so will need to schedule appt with Dr Bruce so we have updated information to send.     [employer] forms are in my office

## 2021-09-08 NOTE — LETTER
9/8/2021       RE: Zhen Sherman  [address]  [place] [address]     Dear Colleague,    Thank you for referring your patient, Zhen Sherman, to the [facility]OLOGY CL[facility]IC [place] [facility]. Please see a copy of my visit note below.    Zhen is a 61 year old who is being evaluated via a billable video visit.      How would you like to obtain your AVS? [employer]  If the video visit is dropped, the invitation should be resent by: Text to cell phone: [phone]  Will anyone else be joining your video visit? No      Video Start Time: 0910 AM  Video-Visit Details  Zhen Sherman is a 61 year old male who is being evaluated via a billable video visit.      How would you like to obtain your AVS? [employer]        Video Start Time: 910 AM        Chief Complaint:    Gross Hematuria          Consult or Referral:     Mr. Zhen Sherman is a 61 year old male evaluated at the request of Dr. Myers.           History of Present Illness:    Zhen Sherman is a very pleasant 61 year old male who presents with a history of Gross Hematuria which started 2 to 3 weeks ago.  He does not report any associated urinary symptoms.  He does not recall any family history of genitourinary tract malignancies, or personal history of recurrent UTIs, kidney stones.  He is not a smoker.     Of note, he had a stroke in 2018 resulted in aphasia with no clear motor deficit.  After intense speech rehab, aphasia has almost completely resolved.  Occasionally, he has a hard time finding words.    She has a very healthy lifestyle and exercise regularly.  ECOG 0         Past Medical History:   CVA in 2018          Past Surgical History:     Shoulder surgery          Medications     Current Outpatient Medications   Medication     aspirin 81 MG chewable tablet     senna-docusate (SENOKOT-S;PERICOLACE) 8.6-50 MG per tablet     simvastatin (ZOCOR) 20 MG tablet     traZODone (DESYREL) 50  MG tablet     No current facility-administered medications for this visit.   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[employer]; work in a [employer]   Fit with healthy lifestyle   Non smoker   Social drinker   No drugs or marijuna     Social History     Socioeconomic History     Marital status:      Spouse name: Not on file     Number of children: Not on file     Years of education: Not on file     Highest education level: Not on file   Occupational History     Not on file   Tobacco Use     Smoking status: Never Smoker     Smokeless tobacco: Never Used   Substance and Sexual Activity     Alcohol use: Not on file     Drug use: Not on file     Sexual activity: Not on file   Other Topics Concern     Not on file   Social History Narrative     Not on file     Social Determinants of Health     Financial Resource Strain:      Difficulty of Paying Living Expenses:    Food Insecurity:      Worried About Running Out of Food in the Last Year:      Ran Out of Food in the Last Year:    Transportation Needs:      Lack of Transportation (Medical):      Lack of Transportation (Non-Medical):    Physical Activity:      Days of Exercise per Week:      Minutes of Exercise per Session:    Stress:      Feeling of Stress :    Social Connections:      Frequency of Communication with Friends and Family:      Frequency of Social Gatherings with Friends and Family:      Attends Rastafarian Services:      Active Member of Clubs or Organizations:      Attends Club or Organization Meetings:      Marital Status:    Intimate Partner Violence:      Fear  of Current or Ex-Partner:      Emotionally Abused:      Physically Abused:      Sexually Abused:             Allergies:   Pollen extracts [pollen extract]         Review of Systems:      Constitutional, HEENT, cardiovascular, pulmonary, GI, , musculoskeletal, neuro, skin, endocrine and psych systems are negative, except as otherwise noted.         Physical Exam:     Vitals:  No vitals were obtained today due to virtual visit.    Physical Exam   GENERAL: Healthy, alert and no distress  EYES: Eyes grossly normal to inspection.  No discharge or erythema, or obvious scleral/conjunctival abnormalities.  RESP: No audible wheeze, cough, or visible cyanosis.  No visible retractions or increased work of breathing.    MS: No gross musculoskeletal defects noted.  Normal range of motion.  No visible edema.  SKIN: Visible skin clear. No significant rash, abnormal pigmentation or lesions.  NEURO: Cranial nerves grossly intact.  Mentation and speech appropriate for age.  PSYCH: Mentation appears normal, affect normal/bright, judgement and insight intact, normal speech and appearance well-groomed.      Labs and Pathology:    I reviewed all applicable laboratory and pathology data and went over findings with patient  Significant for   Lab Results   Component Value Date    CR 0.90 05/29/2018    CR 0.67 04/10/2018    CR 0.80 04/07/2018    CR 0.8 04/07/2018     Prostate Specific Antigen Screen   Date Value Ref Range Status   05/12/2016 2.6 0.00 - 3.50 ng/mL Final             Imaging:    No images are available for this visit           Outside and Past Medical records:    Review of relevant notes as documented in Epic   Review of prior external note(s) from - [facility] information from stroke and neurosurgery and IR reviewed         Assessment and Plan:     Assessment:  Gross hematuria    We discussed different etiologies for gross hematuria including but not limited to urinary tract infection, benign prostatic hyperplasia, and  more significant pathologies such as genitourinary tract malignancies. One of the etiologies that always needs to be ruled out is bladder tumor.  Standard work-up includes imaging in the form of CT urogram and office cystoscopy.    Plan:  CT urogram   In person visit for cystoscopy     Type of service:  Video Visit    Video End Time:9:33 AM    Originating Location (pt. Location): Home    Distant Location (provider location):  [facility]OLOG[facility] CL[facility]IC [place]     Platform used for Video Visit: [employer]      30 total minutes spent on the date of the encounter including direct interaction with the patient, performing chart review, documentation and further activities as noted above.    Yani Caal MD   Urology  [facility]

## 2021-09-08 NOTE — LETTER
September 9, 2021       TO: Zhen Sherman  [address] [place] [address]       DearMr.Whit,    We are writing to inform you of your test results.    {p results letter list:999678}    No results found from the In Basket message.    ***

## 2021-09-09 ENCOUNTER — TELEPHONE (OUTPATIENT)
Dept: UROLOGY | Facility: CLINIC | Age: 61
End: 2021-09-09

## 2021-09-09 NOTE — PATIENT INSTRUCTIONS
Patient Education     Blood in the Urine    Blood in the urine (hematuria) has many possible causes. If it occurs after an injury (such as a car accident or fall), it is most often a sign of bruising to the kidney or bladder. Common causes of blood in the urine include urinary tract infections, kidney stones, inflammation, tumors, or certain other diseases of the kidney or bladder. Menstruation can cause blood to appear in the urine sample, but it is not coming from the urinary tract.   If only a tiny (trace) amount of blood is present, it will show up on the urine test, even though the urine may be yellow and not pink or red. This may occur with any of the above conditions, as well as heavy exercise or high fever. In this case, your healthcare provider may want to repeat the urine test on another day. This will show if there is still blood in the urine. If there is, then other tests can be done to find out the cause.   Home care  Follow these home care guidelines:    If your urine does not look bloody (pink, brown, or red) then you don't need to restrict your activity in any way.    If you can see blood in your urine, rest and don't do any strenuous activity until your next exam. Don't use aspirin, blood thinners, or anti-platelet or anti-inflammatory medicines. These include ibuprofen and naproxen. These thin the blood and may increase bleeding. Call your healthcare provider to talk about using these medicines.  Follow-up care  Follow up with your healthcare provider, or as advised. If you were injured and had blood in your urine, you should have a repeat urine test in 1 to 2 days. Contact your provider for this test.   A radiologist will review any X-rays that were taken. You will be told of any new findings that may affect your care.   When to seek medical advice  Call your healthcare provider right away if any of these occur:    Bright red blood or blood clots in the urine (if you did not have this  before)    Weakness, dizziness or fainting    New groin, belly, or back pain    Fever of 100.4 F (38 C) or higher, or as directed by your provider    Repeated vomiting    Bleeding from the nose or gums or easy bruising  Raphael last reviewed this educational content on 9/1/2019 2000-2021 [employer]. All rights reserved. This information is not intended as a substitute for professional medical care. Always follow your healthcare professional's instructions.

## 2021-09-14 ENCOUNTER — OFFICE VISIT (OUTPATIENT)
Dept: FAMILY MEDICINE | Facility: CLINIC | Age: 61
End: 2021-09-14
Payer: MEDICARE

## 2021-09-14 VITALS
DIASTOLIC BLOOD PRESSURE: 69 MMHG | HEART RATE: 60 BPM | BODY MASS INDEX: 19.89 KG/M2 | TEMPERATURE: 96.7 F | WEIGHT: 160 LBS | HEIGHT: 75 IN | SYSTOLIC BLOOD PRESSURE: 128 MMHG

## 2021-09-14 DIAGNOSIS — D64.9 ANEMIA, UNSPECIFIED TYPE: ICD-10-CM

## 2021-09-14 DIAGNOSIS — Z00.00 HEALTH CARE MAINTENANCE: ICD-10-CM

## 2021-09-14 DIAGNOSIS — Z86.73 HISTORY OF STROKE: ICD-10-CM

## 2021-09-14 DIAGNOSIS — Z12.5 ENCOUNTER FOR SCREENING FOR MALIGNANT NEOPLASM OF PROSTATE: ICD-10-CM

## 2021-09-14 DIAGNOSIS — F33.41 RECURRENT MAJOR DEPRESSIVE DISORDER, IN PARTIAL REMISSION (H): ICD-10-CM

## 2021-09-14 DIAGNOSIS — Z12.11 COLON CANCER SCREENING: Primary | ICD-10-CM

## 2021-09-14 LAB
ALBUMIN SERPL-MCNC: 3.5 G/DL (ref 3.5–5)
ALP SERPL-CCNC: 168 U/L (ref 45–120)
ALT SERPL W P-5'-P-CCNC: 12 U/L (ref 0–45)
ANION GAP SERPL CALCULATED.3IONS-SCNC: 11 MMOL/L (ref 5–18)
AST SERPL W P-5'-P-CCNC: 15 U/L (ref 0–40)
BILIRUB SERPL-MCNC: 0.3 MG/DL (ref 0–1)
BUN SERPL-MCNC: 12 MG/DL (ref 8–22)
CALCIUM SERPL-MCNC: 9.1 MG/DL (ref 8.5–10.5)
CHLORIDE BLD-SCNC: 106 MMOL/L (ref 98–107)
CHOLEST SERPL-MCNC: 116 MG/DL
CO2 SERPL-SCNC: 25 MMOL/L (ref 22–31)
CREAT SERPL-MCNC: 0.85 MG/DL (ref 0.7–1.3)
ERYTHROCYTE [DISTWIDTH] IN BLOOD BY AUTOMATED COUNT: 13.3 % (ref 10–15)
FASTING STATUS PATIENT QL REPORTED: YES
FERRITIN SERPL-MCNC: 14 NG/ML (ref 27–300)
GFR SERPL CREATININE-BSD FRML MDRD: >90 ML/MIN/1.73M2
GLUCOSE BLD-MCNC: 87 MG/DL (ref 70–125)
HCT VFR BLD AUTO: 34 % (ref 40–53)
HDLC SERPL-MCNC: 51 MG/DL
HGB BLD-MCNC: 10.5 G/DL (ref 13.3–17.7)
LDLC SERPL CALC-MCNC: 57 MG/DL
MCH RBC QN AUTO: 27.4 PG (ref 26.5–33)
MCHC RBC AUTO-ENTMCNC: 30.9 G/DL (ref 31.5–36.5)
MCV RBC AUTO: 89 FL (ref 78–100)
PLATELET # BLD AUTO: 325 10E3/UL (ref 150–450)
POTASSIUM BLD-SCNC: 4.4 MMOL/L (ref 3.5–5)
PROT SERPL-MCNC: 6.8 G/DL (ref 6–8)
PSA SERPL-MCNC: 2.42 UG/L (ref 0–3.5)
RBC # BLD AUTO: 3.83 10E6/UL (ref 4.4–5.9)
SODIUM SERPL-SCNC: 142 MMOL/L (ref 136–145)
TRIGL SERPL-MCNC: 41 MG/DL
WBC # BLD AUTO: 7.7 10E3/UL (ref 4–11)

## 2021-09-14 PROCEDURE — 80061 LIPID PANEL: CPT | Performed by: FAMILY MEDICINE

## 2021-09-14 PROCEDURE — 82728 ASSAY OF FERRITIN: CPT | Performed by: FAMILY MEDICINE

## 2021-09-14 PROCEDURE — 36415 COLL VENOUS BLD VENIPUNCTURE: CPT | Performed by: FAMILY MEDICINE

## 2021-09-14 PROCEDURE — G0103 PSA SCREENING: HCPCS | Performed by: FAMILY MEDICINE

## 2021-09-14 PROCEDURE — 99214 OFFICE O/P EST MOD 30 MIN: CPT | Mod: 25 | Performed by: FAMILY MEDICINE

## 2021-09-14 PROCEDURE — 90714 TD VACC NO PRESV 7 YRS+ IM: CPT | Performed by: FAMILY MEDICINE

## 2021-09-14 PROCEDURE — 85027 COMPLETE CBC AUTOMATED: CPT | Performed by: FAMILY MEDICINE

## 2021-09-14 PROCEDURE — 90471 IMMUNIZATION ADMIN: CPT | Performed by: FAMILY MEDICINE

## 2021-09-14 PROCEDURE — 80053 COMPREHEN METABOLIC PANEL: CPT | Performed by: FAMILY MEDICINE

## 2021-09-14 RX ORDER — DORZOLAMIDE HCL 20 MG/ML
1 SOLUTION/ DROPS OPHTHALMIC EVERY MORNING
COMMUNITY
Start: 2021-08-07 | End: 2023-02-14

## 2021-09-14 RX ORDER — PAROXETINE 20 MG/1
TABLET, FILM COATED ORAL
COMMUNITY
Start: 2021-06-14 | End: 2021-09-14

## 2021-09-14 RX ORDER — LATANOPROST 50 UG/ML
SOLUTION/ DROPS OPHTHALMIC
COMMUNITY
Start: 2021-08-04 | End: 2023-02-14

## 2021-09-14 RX ORDER — PAROXETINE 20 MG/1
20 TABLET, FILM COATED ORAL EVERY MORNING
Qty: 90 TABLET | Refills: 3 | Status: SHIPPED | OUTPATIENT
Start: 2021-09-14 | End: 2022-11-15

## 2021-09-14 ASSESSMENT — PATIENT HEALTH QUESTIONNAIRE - PHQ9: SUM OF ALL RESPONSES TO PHQ QUESTIONS 1-9: 3

## 2021-09-14 ASSESSMENT — MIFFLIN-ST. JEOR: SCORE: 1626.39

## 2021-09-15 DIAGNOSIS — D64.9 ANEMIA, UNSPECIFIED TYPE: Primary | ICD-10-CM

## 2021-09-15 RX ORDER — FERROUS SULFATE 325(65) MG
325 TABLET ORAL
Qty: 90 TABLET | Refills: 1 | Status: SHIPPED | OUTPATIENT
Start: 2021-09-15 | End: 2022-11-15

## 2021-09-16 ENCOUNTER — TELEPHONE (OUTPATIENT)
Dept: FAMILY MEDICINE | Facility: CLINIC | Age: 61
End: 2021-09-16

## 2021-09-16 NOTE — TELEPHONE ENCOUNTER
----- Message from Huy Bruce MD sent at 9/15/2021  9:31 PM CDT -----  Please contact patient.  Cholesterol levels are at goal range.  Hemoglobin, the blood levels however are low which is new and it looks to be from iron deficiency.  Is patient on a special diet? I would like him to start on iron supplement to build back up iron store and improve hemoglobin- I will send to the pharmacy.  Side effect could be constipation, so if patient starts to have problem he should take a stool softner.  He should take iron supplement for 2-3 months and recheck hemoglobin levels.  Kidney and liver function are normal, no signs of diabetes.  PSA is stable- we will monitor yearly.

## 2021-09-20 ENCOUNTER — HOSPITAL ENCOUNTER (OUTPATIENT)
Dept: CT IMAGING | Facility: CLINIC | Age: 61
Discharge: HOME | End: 2021-09-20
Attending: UROLOGY | Admitting: UROLOGY
Payer: MEDICARE

## 2021-09-20 DIAGNOSIS — R31.9 HEMATURIA, UNSPECIFIED TYPE: ICD-10-CM

## 2021-09-20 PROCEDURE — 74178 CT ABD&PLV WO CNTR FLWD CNTR: CPT | Mod: ME

## 2021-09-20 PROCEDURE — 250N000011 HC RX IP 250 OP 636: Performed by: UROLOGY

## 2021-09-20 RX ORDER — IOPAMIDOL 755 MG/ML
100 INJECTION, SOLUTION INTRAVASCULAR ONCE
Status: COMPLETED | OUTPATIENT
Start: 2021-09-20 | End: 2021-09-20

## 2021-09-20 RX ADMIN — IOPAMIDOL 100 ML: 755 INJECTION, SOLUTION INTRAVENOUS at 17:14

## 2021-09-21 NOTE — PROGRESS NOTES
Assessment & Plan     Health care maintenance  Pt is over due for labs- will obtain today and f/u based on results- low hgb noted today- will further assess with iron study  - Comprehensive metabolic panel (BMP + Alb, Alk Phos, ALT, AST, Total. Bili, TP); Future  - CBC with platelets; Future  - Lipid Profile (Chol, Trig, HDL, LDL calc); Future  - PSA, screen; Future  - PARoxetine (PAXIL) 20 MG tablet; Take 1 tablet (20 mg) by mouth every morning  - Comprehensive metabolic panel (BMP + Alb, Alk Phos, ALT, AST, Total. Bili, TP)  - CBC with platelets  - Lipid Profile (Chol, Trig, HDL, LDL calc)  - PSA, screen    Colon cancer screening  Due for colon cancer screening  - Adult Gastro Ref - Procedure Only; Future    Encounter for screening for malignant neoplasm of prostate   Screen for prostate cancer  - PSA, screen; Future  - PSA, screen    Anemia, unspecified type  Anemia - no obvious blood loss- will get ferritin levels  - Ferritin; Future  - Ferritin    History of stroke  Hx of stroke- has not been able to work his previous job for years  Left with damage to thinking process, language both with speech and understanding  Has paperwork to be filled out- pt not able to work at this time his previous job  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    Huy Bruce MD  [facility]    Brittany   Zhen is a 61 year old who presents for the following health issues   HPI   Pt is a 62 yo M here for paperwork- pt is on long term disability from a stroke that occurred years ago.  Pt has not been seen by me for a couple of years.  Pt was left with congitive and speech problems after the stroke and could not return  "to his work.  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  He gets to see his daughters which bring him much eric.  He feels his current selective serotonin reuptake inhibitor is working well.  He is due for blood work- initial labs in clinic show new anemia- we will further assess with ferritin and f/u based on results.  Pt is due for colonoscopy- no acute blood loss noted by patient- no bleeding or bruising noted- wonder if iron deficiency is playing a roll.  Obtain labs today and f/u based on results  Forms filled out for his disability- pt unable to preform his previous job.      Review of Systems   Constitutional, HEENT, cardiovascular, pulmonary, gi and gu systems are negative, except as otherwise noted.      Objective    /69   Pulse 60   Temp (!) 96.7  F (35.9  C) (Oral)   Ht 1.905 m (6' 3\")   Wt 72.6 kg (160 lb)   BMI 20.00 kg/m    Body mass index is 20 kg/m .  Physical Exam   GENERAL: healthy, alert and no distress  EYES: Eyes grossly normal to inspection, PERRL and conjunctivae and sclerae normal  Resp: clear  CV: RRR, no murmurs  Abd: normal bowel sounds  Neuro: no acute abnormalities of concern  Speech- improved in comparison to years ago- but still slow in speech and seems slow to following conversation easily            "

## 2021-09-29 ENCOUNTER — TELEPHONE (OUTPATIENT)
Dept: FAMILY MEDICINE | Facility: CLINIC | Age: 61
End: 2021-09-29

## 2021-09-29 NOTE — TELEPHONE ENCOUNTER
Patient is calling because [employer] is stating they have not received the paperwork filled out at his visit on 9/14/2021.  Please call Zhen at [phone] to let him that we refaxed the forms.

## 2021-10-10 ENCOUNTER — HEALTH MAINTENANCE LETTER (OUTPATIENT)
Age: 61
End: 2021-10-10

## 2021-11-03 ENCOUNTER — TELEPHONE (OUTPATIENT)
Dept: FAMILY MEDICINE | Facility: CLINIC | Age: 61
End: 2021-11-03
Payer: MEDICARE

## 2021-11-03 NOTE — TELEPHONE ENCOUNTER
Patient dropping off paperwork from [employer] to be filled out for life insurance. Patient would like a call when this is completed so he can pick this up in office. Handing off to Deidre castro.

## 2021-11-12 NOTE — TELEPHONE ENCOUNTER
Left detailed message for patient that Dr. Bruce has completed Attending Physician's Progress Report and is ready for  at clinic .

## 2021-11-24 ENCOUNTER — IMMUNIZATION (OUTPATIENT)
Dept: NURSING | Facility: CLINIC | Age: 61
End: 2021-11-24
Payer: MEDICARE

## 2021-11-24 PROCEDURE — G0008 ADMIN INFLUENZA VIRUS VAC: HCPCS

## 2021-11-24 PROCEDURE — 91300 PR COVID VAC PFIZER DIL RECON 30 MCG/0.3 ML IM: CPT

## 2021-11-24 PROCEDURE — 90682 RIV4 VACC RECOMBINANT DNA IM: CPT

## 2021-11-24 PROCEDURE — 0004A PR COVID VAC PFIZER DIL RECON 30 MCG/0.3 ML IM: CPT

## 2021-12-01 DIAGNOSIS — G47.00 INSOMNIA, UNSPECIFIED TYPE: ICD-10-CM

## 2021-12-03 RX ORDER — TRAZODONE HYDROCHLORIDE 50 MG/1
TABLET, FILM COATED ORAL
Qty: 135 TABLET | Refills: 3 | Status: SHIPPED | OUTPATIENT
Start: 2021-12-03 | End: 2022-01-12

## 2021-12-03 NOTE — TELEPHONE ENCOUNTER
"Last Written Prescription Date:  9/3/21  Last Fill Quantity: 135,  # refills: 0   Last office visit provider:  9/14/21     Requested Prescriptions   Pending Prescriptions Disp Refills     traZODone (DESYREL) 50 MG tablet [Pharmacy Med Name: TRAZODONE 50MG TABLETS] 135 tablet 0     Sig: TAKE 1 AND 1/2 TABLETS BY MOUTH EVERY NIGHT AT BEDTIME       Serotonin Modulators Passed - 12/1/2021 11:18 AM        Passed - Recent (12 mo) or future (30 days) visit within the authorizing provider's specialty     Patient has had an office visit with the authorizing provider or a provider within the authorizing providers department within the previous 12 mos or has a future within next 30 days. See \"Patient Info\" tab in inbasket, or \"Choose Columns\" in Meds & Orders section of the refill encounter.              Passed - Medication is active on med list        Passed - Patient is age 18 or older             Kevin Mercedes RN 12/03/21 9:07 AM  "

## 2022-01-12 DIAGNOSIS — G47.00 INSOMNIA, UNSPECIFIED TYPE: ICD-10-CM

## 2022-01-12 RX ORDER — TRAZODONE HYDROCHLORIDE 50 MG/1
TABLET, FILM COATED ORAL
Qty: 135 TABLET | Refills: 3 | Status: SHIPPED | OUTPATIENT
Start: 2022-01-12 | End: 2023-01-06

## 2022-01-26 ENCOUNTER — OFFICE VISIT (OUTPATIENT)
Dept: OPHTHALMOLOGY | Facility: CLINIC | Age: 62
End: 2022-01-26
Payer: MEDICARE

## 2022-01-26 DIAGNOSIS — H40.1131 PRIMARY OPEN ANGLE GLAUCOMA (POAG) OF BOTH EYES, MILD STAGE: Primary | ICD-10-CM

## 2022-01-26 PROCEDURE — 92133 CPTRZD OPH DX IMG PST SGM ON: CPT | Performed by: STUDENT IN AN ORGANIZED HEALTH CARE EDUCATION/TRAINING PROGRAM

## 2022-01-26 PROCEDURE — 92002 INTRM OPH EXAM NEW PATIENT: CPT | Performed by: STUDENT IN AN ORGANIZED HEALTH CARE EDUCATION/TRAINING PROGRAM

## 2022-01-26 PROCEDURE — 92083 EXTENDED VISUAL FIELD XM: CPT | Performed by: STUDENT IN AN ORGANIZED HEALTH CARE EDUCATION/TRAINING PROGRAM

## 2022-01-26 ASSESSMENT — CUP TO DISC RATIO
OD_RATIO: 0.6 UD
OS_RATIO: 0.5 UD

## 2022-01-26 ASSESSMENT — REFRACTION_WEARINGRX
OS_ADD: +2.50
SPECS_TYPE: PAL
OD_AXIS: 001
OD_SPHERE: -1.25
OS_SPHERE: -2.25
OD_CYLINDER: +0.50
OD_ADD: +2.50
OS_CYLINDER: SPHERE

## 2022-01-26 ASSESSMENT — TONOMETRY
OD_IOP_MMHG: 16
OS_IOP_MMHG: 15
IOP_METHOD: APPLANATION

## 2022-01-26 ASSESSMENT — EXTERNAL EXAM - LEFT EYE: OS_EXAM: NORMAL

## 2022-01-26 ASSESSMENT — CONF VISUAL FIELD
METHOD: COUNTING FINGERS
OD_NORMAL: 1
OS_NORMAL: 1

## 2022-01-26 ASSESSMENT — VISUAL ACUITY
OD_CC: 20/20
CORRECTION_TYPE: GLASSES
METHOD: SNELLEN - LINEAR
OS_CC: 20/25

## 2022-01-26 ASSESSMENT — PACHYMETRY
OS_CT(UM): 554
OD_CT(UM): 557

## 2022-01-26 ASSESSMENT — SLIT LAMP EXAM - LIDS: COMMENTS: 1+ BLEPHARITIS

## 2022-01-26 ASSESSMENT — EXTERNAL EXAM - RIGHT EYE: OD_EXAM: NORMAL

## 2022-01-26 NOTE — LETTER
1/26/2022         RE: Zhen Sherman  [address]  [place] [address]        Dear Colleague,    Thank you for referring your patient, Zhen Sherman, to the [facility]. Please see a copy of my visit note below.     Current Eye Medications:  Dorzolamide every morning both eyes, last took at 6:30 am. Latanoprost every morning both eyes, last took at 6:35 am.      Subjective:  Glaucoma evaluation. Vision is doing well both eyes distance and near. No eye pain or discomfort in either eye. Last eye exam with Dr. Bob at [facility] in [facility] about 1 year ago. Last seen with Ophthalmologist Dr. Kevin Rain in [place] about 6 months ago, he retired.      Objective:  See Ophthalmology Exam.       Assessment:  Zhen Sherman is a 61 year old male who presents with:   Encounter Diagnosis   Name Primary?     Primary open angle glaucoma (POAG) of both eyes, mild stage Intraocular pressure 16/15 today with normal central corneal thickness (557/554). Continue same medications.     OCT optic nerve: avg retinal nerve fiber layer 72/73; thin S&T right eye; thin T and borderline S&I left eye.     Chung visual field (HVF) 24-2: mild scattered loss both eyes.        Plan:  Continue Latanoprost (green top) once daily both eyes     Continue dorzolamide (orange top) every morning in both eyes     Newton Kumar MD  [phone]          Again, thank you for allowing me to participate in the care of your patient.        Sincerely,        Newton Kumar MD

## 2022-01-26 NOTE — PATIENT INSTRUCTIONS
Continue Latanoprost (green top) once daily both eyes     Continue dorzolamide (orange top) every morning in both eyes     Newton Kumar MD  [phone]

## 2022-01-26 NOTE — PROGRESS NOTES
Current Eye Medications:  Dorzolamide every morning both eyes, last took at 6:30 am. Latanoprost every morning both eyes, last took at 6:35 am.      Subjective:  Glaucoma evaluation. Vision is doing well both eyes distance and near. No eye pain or discomfort in either eye. Last eye exam with Dr. Bob at [facility] in [facility] about 1 year ago. Last seen with Ophthalmologist Dr. Kevin Rain in [place] about 6 months ago, he retired.      Objective:  See Ophthalmology Exam.       Assessment:  Zhen Sherman is a 61 year old male who presents with:   Encounter Diagnosis   Name Primary?     Primary open angle glaucoma (POAG) of both eyes, mild stage Intraocular pressure 16/15 today with normal central corneal thickness (557/554). Continue same medications.     OCT optic nerve: avg retinal nerve fiber layer 72/73; thin S&T right eye; thin T and borderline S&I left eye.     Chung visual field (HVF) 24-2: mild scattered loss both eyes.        Plan:  Continue Latanoprost (green top) once daily both eyes     Continue dorzolamide (orange top) every morning in both eyes     Newton Kumar MD  [phone]

## 2022-07-18 ENCOUNTER — LAB (OUTPATIENT)
Dept: FAMILY MEDICINE | Facility: CLINIC | Age: 62
End: 2022-07-18
Attending: FAMILY MEDICINE
Payer: MEDICARE

## 2022-07-18 DIAGNOSIS — Z20.822 CLOSE EXPOSURE TO 2019 NOVEL CORONAVIRUS: ICD-10-CM

## 2022-07-18 PROCEDURE — U0005 INFEC AGEN DETEC AMPLI PROBE: HCPCS

## 2022-07-18 PROCEDURE — U0003 INFECTIOUS AGENT DETECTION BY NUCLEIC ACID (DNA OR RNA); SEVERE ACUTE RESPIRATORY SYNDROME CORONAVIRUS 2 (SARS-COV-2) (CORONAVIRUS DISEASE [COVID-19]), AMPLIFIED PROBE TECHNIQUE, MAKING USE OF HIGH THROUGHPUT TECHNOLOGIES AS DESCRIBED BY CMS-2020-01-R: HCPCS

## 2022-07-19 LAB — SARS-COV-2 RNA RESP QL NAA+PROBE: NEGATIVE

## 2022-09-18 ENCOUNTER — HEALTH MAINTENANCE LETTER (OUTPATIENT)
Age: 62
End: 2022-09-18

## 2022-11-14 DIAGNOSIS — D64.9 ANEMIA, UNSPECIFIED TYPE: ICD-10-CM

## 2022-11-14 DIAGNOSIS — Z00.00 HEALTH CARE MAINTENANCE: ICD-10-CM

## 2022-11-15 RX ORDER — PAROXETINE 20 MG/1
TABLET, FILM COATED ORAL
Qty: 90 TABLET | Refills: 0 | Status: SHIPPED | OUTPATIENT
Start: 2022-11-15 | End: 2023-02-10

## 2022-11-15 NOTE — TELEPHONE ENCOUNTER
"Last Written Prescription Date:  9/14/21  Last Fill Quantity: 90,  # refills: 3   Last office visit provider:  9/14/21 - scheduled 12/9/22     Requested Prescriptions   Pending Prescriptions Disp Refills     PARoxetine (PAXIL) 20 MG tablet [Pharmacy Med Name: PAROXETINE 20MG TABLETS] 90 tablet 3     Sig: TAKE 1 TABLET(20 MG) BY MOUTH EVERY MORNING       SSRIs Protocol Passed - 11/15/2022  2:33 PM        Passed - Recent (12 mo) or future (30 days) visit within the authorizing provider's specialty     Patient has had an office visit with the authorizing provider or a provider within the authorizing providers department within the previous 12 mos or has a future within next 30 days. See \"Patient Info\" tab in inbasket, or \"Choose Columns\" in Meds & Orders section of the refill encounter.              Passed - Medication is active on med list        Passed - Patient is age 18 or older           FEROSUL 325 (65 Fe) MG tablet [Pharmacy Med Name: FERROUS SULFATE 325MG (5GR) TABS] 90 tablet 1     Sig: TAKE 1 TABLET(325 MG) BY MOUTH DAILY WITH BREAKFAST       Iron Supplements Failed - 11/14/2022  2:26 PM        Failed - Hgb OR Hct on record within the past 12 mos.     Patient need only have had a HGB or HCT on file in the past 12 mos. That result does not need to be normal.    Recent Labs   Lab Test 09/14/21  1042 04/10/18  0536 04/07/18  1348   HGB 10.5* 11.3* 15.5     Recent Labs   Lab Test 09/14/21  1042 04/10/18  0536 04/07/18  1348   HCT 34.0* 33.9* 44.9       Please verify a HGB or HCT has been checked SINCE THE LAST DOSE CHANGE.            Passed - Patient is 12 years of age or older        Passed - Recent (12 mo) or future (30 days) visit within the authorizing provider's specialty     Patient has had an office visit with the authorizing provider or a provider within the authorizing providers department within the previous 12 mos or has a future within next 30 days. See \"Patient Info\" tab in inbasket, or \"Choose " "Columns\" in Meds & Orders section of the refill encounter.              Passed - Medication is active on med list             Kevin Mercedes RN 11/15/22 2:33 PM  "

## 2022-11-15 NOTE — TELEPHONE ENCOUNTER
"Routing refill request to provider for review/approval because:  Labs not current:  HGB  A break in medication  Patient needs to be seen because it has been more than 1 year since last office visit.    Last Written Prescription Date:  9/15/21  Last Fill Quantity: 90,  # refills: 1   Last office visit provider:  9/14/21     Requested Prescriptions   Pending Prescriptions Disp Refills     FEROSUL 325 (65 Fe) MG tablet [Pharmacy Med Name: FERROUS SULFATE 325MG (5GR) TABS] 90 tablet 1     Sig: TAKE 1 TABLET(325 MG) BY MOUTH DAILY WITH BREAKFAST       Iron Supplements Failed - 11/14/2022  2:26 PM        Failed - Hgb OR Hct on record within the past 12 mos.     Patient need only have had a HGB or HCT on file in the past 12 mos. That result does not need to be normal.    Recent Labs   Lab Test 09/14/21  1042 04/10/18  0536 04/07/18  1348   HGB 10.5* 11.3* 15.5     Recent Labs   Lab Test 09/14/21  1042 04/10/18  0536 04/07/18  1348   HCT 34.0* 33.9* 44.9       Please verify a HGB or HCT has been checked SINCE THE LAST DOSE CHANGE.            Passed - Patient is 12 years of age or older        Passed - Recent (12 mo) or future (30 days) visit within the authorizing provider's specialty     Patient has had an office visit with the authorizing provider or a provider within the authorizing providers department within the previous 12 mos or has a future within next 30 days. See \"Patient Info\" tab in inbasket, or \"Choose Columns\" in Meds & Orders section of the refill encounter.              Passed - Medication is active on med list         Signed Prescriptions Disp Refills    PARoxetine (PAXIL) 20 MG tablet 90 tablet 0     Sig: TAKE 1 TABLET(20 MG) BY MOUTH EVERY MORNING       SSRIs Protocol Passed - 11/15/2022  2:33 PM        Passed - Recent (12 mo) or future (30 days) visit within the authorizing provider's specialty     Patient has had an office visit with the authorizing provider or a provider within the authorizing " "providers department within the previous 12 mos or has a future within next 30 days. See \"Patient Info\" tab in inbasket, or \"Choose Columns\" in Meds & Orders section of the refill encounter.              Passed - Medication is active on med list        Passed - Patient is age 18 or older             Kevin Mercedes RN 11/15/22 2:35 PM  "

## 2022-11-17 RX ORDER — DORZOLAMIDE HCL 20 MG/ML
SOLUTION/ DROPS OPHTHALMIC
Qty: 10 ML | OUTPATIENT
Start: 2022-11-17

## 2022-11-17 RX ORDER — LATANOPROST 50 UG/ML
SOLUTION/ DROPS OPHTHALMIC
Qty: 7.5 ML | OUTPATIENT
Start: 2022-11-17

## 2022-11-17 RX ORDER — FERROUS SULFATE 325(65) MG
325 TABLET ORAL
Qty: 90 TABLET | Refills: 0 | Status: SHIPPED | OUTPATIENT
Start: 2022-11-17 | End: 2024-07-02

## 2022-12-09 ENCOUNTER — OFFICE VISIT (OUTPATIENT)
Dept: FAMILY MEDICINE | Facility: CLINIC | Age: 62
End: 2022-12-09
Payer: MEDICARE

## 2022-12-09 VITALS
BODY MASS INDEX: 22.13 KG/M2 | OXYGEN SATURATION: 99 % | DIASTOLIC BLOOD PRESSURE: 95 MMHG | HEART RATE: 55 BPM | HEIGHT: 73 IN | SYSTOLIC BLOOD PRESSURE: 164 MMHG | RESPIRATION RATE: 16 BRPM | TEMPERATURE: 96.5 F | WEIGHT: 167 LBS

## 2022-12-09 DIAGNOSIS — Z00.00 HEALTH CARE MAINTENANCE: Primary | ICD-10-CM

## 2022-12-09 DIAGNOSIS — Q23.81 BICUSPID AORTIC VALVE: ICD-10-CM

## 2022-12-09 DIAGNOSIS — Z12.11 SCREEN FOR COLON CANCER: ICD-10-CM

## 2022-12-09 DIAGNOSIS — Z12.5 ENCOUNTER FOR SCREENING FOR MALIGNANT NEOPLASM OF PROSTATE: ICD-10-CM

## 2022-12-09 DIAGNOSIS — Z86.73 HISTORY OF STROKE: ICD-10-CM

## 2022-12-09 LAB
ALBUMIN SERPL BCG-MCNC: 3.6 G/DL (ref 3.5–5.2)
ALP SERPL-CCNC: 154 U/L (ref 40–129)
ALT SERPL W P-5'-P-CCNC: 12 U/L (ref 10–50)
ANION GAP SERPL CALCULATED.3IONS-SCNC: 14 MMOL/L (ref 7–15)
AST SERPL W P-5'-P-CCNC: 19 U/L (ref 10–50)
BILIRUB SERPL-MCNC: 0.2 MG/DL
BUN SERPL-MCNC: 12.3 MG/DL (ref 8–23)
CALCIUM SERPL-MCNC: 8.7 MG/DL (ref 8.8–10.2)
CHLORIDE SERPL-SCNC: 104 MMOL/L (ref 98–107)
CHOLEST SERPL-MCNC: 99 MG/DL
CREAT SERPL-MCNC: 0.82 MG/DL (ref 0.67–1.17)
DEPRECATED HCO3 PLAS-SCNC: 24 MMOL/L (ref 22–29)
ERYTHROCYTE [DISTWIDTH] IN BLOOD BY AUTOMATED COUNT: 14.3 % (ref 10–15)
FERRITIN SERPL-MCNC: 22 NG/ML (ref 31–409)
GFR SERPL CREATININE-BSD FRML MDRD: >90 ML/MIN/1.73M2
GLUCOSE SERPL-MCNC: 86 MG/DL (ref 70–99)
HCT VFR BLD AUTO: 31.1 % (ref 40–53)
HDLC SERPL-MCNC: 44 MG/DL
HGB BLD-MCNC: 9.5 G/DL (ref 13.3–17.7)
LDLC SERPL CALC-MCNC: 46 MG/DL
MCH RBC QN AUTO: 25.9 PG (ref 26.5–33)
MCHC RBC AUTO-ENTMCNC: 30.5 G/DL (ref 31.5–36.5)
MCV RBC AUTO: 85 FL (ref 78–100)
NONHDLC SERPL-MCNC: 55 MG/DL
PLATELET # BLD AUTO: 305 10E3/UL (ref 150–450)
POTASSIUM SERPL-SCNC: 3.9 MMOL/L (ref 3.4–5.3)
PROT SERPL-MCNC: 7.1 G/DL (ref 6.4–8.3)
PSA SERPL-MCNC: 2.3 NG/ML (ref 0–4.5)
RBC # BLD AUTO: 3.67 10E6/UL (ref 4.4–5.9)
SODIUM SERPL-SCNC: 142 MMOL/L (ref 136–145)
TRIGL SERPL-MCNC: 46 MG/DL
WBC # BLD AUTO: 6.6 10E3/UL (ref 4–11)

## 2022-12-09 PROCEDURE — 80053 COMPREHEN METABOLIC PANEL: CPT | Performed by: FAMILY MEDICINE

## 2022-12-09 PROCEDURE — 80061 LIPID PANEL: CPT | Performed by: FAMILY MEDICINE

## 2022-12-09 PROCEDURE — 82728 ASSAY OF FERRITIN: CPT | Performed by: FAMILY MEDICINE

## 2022-12-09 PROCEDURE — 36415 COLL VENOUS BLD VENIPUNCTURE: CPT | Performed by: FAMILY MEDICINE

## 2022-12-09 PROCEDURE — 90682 RIV4 VACC RECOMBINANT DNA IM: CPT | Performed by: FAMILY MEDICINE

## 2022-12-09 PROCEDURE — 91312 COVID-19 VACCINE BIVALENT BOOSTER 12+ (PFIZER): CPT | Performed by: FAMILY MEDICINE

## 2022-12-09 PROCEDURE — 99213 OFFICE O/P EST LOW 20 MIN: CPT | Mod: 25 | Performed by: FAMILY MEDICINE

## 2022-12-09 PROCEDURE — 0124A COVID-19 VACCINE BIVALENT BOOSTER 12+ (PFIZER): CPT | Performed by: FAMILY MEDICINE

## 2022-12-09 PROCEDURE — G0103 PSA SCREENING: HCPCS | Performed by: FAMILY MEDICINE

## 2022-12-09 PROCEDURE — 85027 COMPLETE CBC AUTOMATED: CPT | Performed by: FAMILY MEDICINE

## 2022-12-09 PROCEDURE — G0008 ADMIN INFLUENZA VIRUS VAC: HCPCS | Performed by: FAMILY MEDICINE

## 2022-12-09 ASSESSMENT — PATIENT HEALTH QUESTIONNAIRE - PHQ9
SUM OF ALL RESPONSES TO PHQ QUESTIONS 1-9: 4
SUM OF ALL RESPONSES TO PHQ QUESTIONS 1-9: 4
10. IF YOU CHECKED OFF ANY PROBLEMS, HOW DIFFICULT HAVE THESE PROBLEMS MADE IT FOR YOU TO DO YOUR WORK, TAKE CARE OF THINGS AT HOME, OR GET ALONG WITH OTHER PEOPLE: NOT DIFFICULT AT ALL

## 2022-12-09 NOTE — PROGRESS NOTES
Assessment & Plan     Bicuspid aortic valve  Patient like to discuss his history of bicuspid valve  He has no murmur on examination reviewed echo from a few years ago indicating no stenosis bicuspid valve noted discussed with him we can monitor at this time    Screen for colon cancer  Patient due for colon cancer screening  - Colonoscopy Screening  Referral; Future    Health care maintenance  Patient is fasting today will obtain blood work and follow-up based on results  - PSA, screen; Future  - Ferritin; Future  - CBC with platelets; Future  - Comprehensive metabolic panel (BMP + Alb, Alk Phos, ALT, AST, Total. Bili, TP); Future  - Lipid Profile (Chol, Trig, HDL, LDL calc); Future  - PSA, screen  - Ferritin  - CBC with platelets  - Comprehensive metabolic panel (BMP + Alb, Alk Phos, ALT, AST, Total. Bili, TP)  - Lipid Profile (Chol, Trig, HDL, LDL calc)    Encounter for screening for malignant neoplasm of prostate  Patient due for prostate cancer screening  - PSA, screen; Future  - PSA, screen    History of stroke  Patient had a stroke a few years ago leading to aphasia difficulty  He has been unable to continue to work his previous employment  Currently does some odds and ends work and periodically works at a local [place]  He has forms to be filled out today in regards to his inability to perform his previous work          No follow-ups on file.    Huy Bruce MD  [facility]   Zhen is a 62 year old, presenting for the following health issues:  Follow Up (Discuss Bicuspid Aortic Valve) and Forms (Disability form)  62-year-old male here for paperwork that needs to be filled out for his history of a stroke and aphasia  Is due for labs and prostate cancer and colon cancer screening which we will order today  Is concerned that he needs to be doing something more in regards to his history of a bicuspid aortic valve we discussed monitoring at this  "time  Echo a few years ago showed no signs of stenosis and is having no symptoms of concern  We will continue to monitor  He is unable to work his previous employment  Presently has been doing some odds and ends work and 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    History of Present Illness       Reason for visit:  Bicuspid aortic valve issues    He eats 2-3 servings of fruits and vegetables daily.He consumes 0 sweetened beverage(s) daily.He exercises with enough effort to increase his heart rate 20 to 29 minutes per day.  He exercises with enough effort to increase his heart rate 5 days per week.   He is taking medications regularly.    Today's PHQ-9         PHQ-9 Total Score: 4    PHQ-9 Q9 Thoughts of better off dead/self-harm past 2 weeks :   Not at all    How difficult have these problems made it for you to do your work, take care of things at home, or get along with other people: Not difficult at all             Review of Systems   Constitutional, HEENT, cardiovascular, pulmonary, gi and gu systems are negative, except as otherwise noted.      Objective    BP (!) 169/87 (BP Location: Left arm, Patient Position: Sitting, Cuff Size: Adult Regular)   Pulse 56   Temp (!) 96.5  F (35.8  C) (Oral)   Resp 16   Ht 1.854 m (6' 1\")   Wt 75.8 kg (167 lb)   SpO2 99%   BMI 22.03 kg/m    Body mass index is 22.03 kg/m .  Physical Exam   GENERAL: healthy, alert and no distress  RESP: lungs clear to auscultation - no rales, rhonchi or wheezes  CV: regular rate and rhythm, normal S1 S2, no S3 or S4, no murmur, click or rub, no peripheral edema and peripheral pulses strong  MS: no gross musculoskeletal defects noted, no edema  NEURO: Normal strength and tone, sensory exam grossly normal and still having word finding difficulty and some aphasia improved from 3 years ago with complete word " deafness

## 2022-12-13 ENCOUNTER — TELEPHONE (OUTPATIENT)
Dept: FAMILY MEDICINE | Facility: CLINIC | Age: 62
End: 2022-12-13

## 2022-12-13 DIAGNOSIS — D50.9 IRON DEFICIENCY ANEMIA, UNSPECIFIED IRON DEFICIENCY ANEMIA TYPE: Primary | ICD-10-CM

## 2022-12-13 RX ORDER — FERROUS SULFATE 325(65) MG
325 TABLET ORAL
Qty: 90 TABLET | Refills: 1 | Status: SHIPPED | OUTPATIENT
Start: 2022-12-13 | End: 2023-11-21

## 2022-12-13 NOTE — TELEPHONE ENCOUNTER
----- Message from Huy Bruce MD sent at 12/13/2022  9:15 AM CST -----  Cholesterol levels are at goal range.  PSA is stable - we will monitor yearly.  Hemoglobin levels are low- and so is ferritin- would recommend starting iron supplement to increase hemoglobin.  If he is in agreement I will send to the pharmacy.  Kidney and liver function are normal, no signs of diabetes.

## 2022-12-13 NOTE — TELEPHONE ENCOUNTER
Pt returned call, results were relayed. Pt is in agreement with iron supplement. No further questions at this time

## 2022-12-13 NOTE — TELEPHONE ENCOUNTER
Left message to call back for: Results  Information to relay to patient: LMTCB, please see message below from provider.

## 2023-01-05 DIAGNOSIS — G47.00 INSOMNIA, UNSPECIFIED TYPE: ICD-10-CM

## 2023-01-06 RX ORDER — TRAZODONE HYDROCHLORIDE 50 MG/1
TABLET, FILM COATED ORAL
Qty: 135 TABLET | Refills: 3 | Status: SHIPPED | OUTPATIENT
Start: 2023-01-06 | End: 2024-07-02

## 2023-01-06 NOTE — TELEPHONE ENCOUNTER
"Last Written Prescription Date:  1/12/22  Last Fill Quantity: 135,  # refills: 3   Last office visit provider:  12/9/22     Requested Prescriptions   Pending Prescriptions Disp Refills     traZODone (DESYREL) 50 MG tablet [Pharmacy Med Name: TRAZODONE 50MG TABLETS] 135 tablet 3     Sig: TAKE 1 AND 1/2 TABLETS BY MOUTH EVERY NIGHT AT BEDTIME       Serotonin Modulators Passed - 1/6/2023  8:11 AM        Passed - Recent (12 mo) or future (30 days) visit within the authorizing provider's specialty     Patient has had an office visit with the authorizing provider or a provider within the authorizing providers department within the previous 12 mos or has a future within next 30 days. See \"Patient Info\" tab in inbasket, or \"Choose Columns\" in Meds & Orders section of the refill encounter.              Passed - Medication is active on med list        Passed - Patient is age 18 or older             Kevin Mercedes RN 01/06/23 8:12 AM  "

## 2023-01-20 ENCOUNTER — TELEPHONE (OUTPATIENT)
Dept: GASTROENTEROLOGY | Facility: CLINIC | Age: 63
End: 2023-01-20
Payer: MEDICARE

## 2023-01-20 ENCOUNTER — HOSPITAL ENCOUNTER (OUTPATIENT)
Facility: AMBULATORY SURGERY CENTER | Age: 63
End: 2023-01-20
Attending: SURGERY
Payer: MEDICARE

## 2023-01-20 ENCOUNTER — MYC REFILL (OUTPATIENT)
Dept: FAMILY MEDICINE | Facility: CLINIC | Age: 63
End: 2023-01-20
Payer: MEDICARE

## 2023-01-20 NOTE — TELEPHONE ENCOUNTER
"    Screening Questions  BLUE  KIND OF PREP RED  LOCATION [review exclusion criteria] GREEN  SEDATION TYPE        y Are you active on mychart?       Teo Ordering/Referring Provider?        Medicare What type of coverage do you have?      n Have you had a positive covid test in the last 14 days?     21.2 1. BMI  [BMI 40+ - review exclusion criteria]    y  2. Are you able to give consent for your medical care? [IF NO,RN REVIEW]          n  3. Are you taking any prescription pain medications on a routine schedule   (ex narcotics: tramadol, oxycodone, roxicodone, oxycontin,  and percocet)?        n  3a. EXTENDED PREP What kind of prescription?     n 4. Do you have any chemical dependencies such as alcohol, street drugs, or methadone?        **If yes 3- 5 , please schedule with MAC sedation.**          IF YES TO ANY 6 - 10 - HOSPITAL SETTING ONLY.     n6.   Do you need assistance transferring?     n 7.   Have you had a heart or lung transplant?    n 8.   Are you currently on dialysis?   n 9.   Do you use daily home oxygen?   n 10. Do you take nitroglycerin?   10a. n If yes, how often?     11. [FEMALES]  n Are you currently pregnant?    11a. n If yes, how many weeks? [ Greater than 12 weeks, OR NEEDED]    n 12. Do you have Pulmonary Hypertension? *NEED PAC APPT AT [facility]*     n 13. [review exclusion criteria]  Do you have any implantable devices in your body (pacemaker, defib, LVAD)?    n 14. In the past 6 months, have you had any heart related issues including cardiomyopathy or heart attack?     14a. n If yes, did it require cardiac stenting if so when?     n 15. Have you had a stroke or Transient ischemic attack (TIA - aka  mini stroke ) within 6 months?      n 16. Do you have mod to severe Obstructive Sleep Apnea?  [Hospital only]    n 17. Do you have SEVERE AND UNCONTROLLED asthma? *NEED PAC APPT AT [facility]*     n 18. Are you currently taking any blood thinners?     18a. If yes, inform patient to \"follow up w/ " "ordering provider for bridging instructions.\"    n 19. Do you take the medication Phentermine?    19a. If yes, \"Hold for 7 days before procedure.  Please consult your prescribing provider if you have questions about holding this medication.\"     n  20. Do you have chronic kidney disease?      n  21. Do you have a diagnosis of diabetes?     y  22. On a regular basis do you go 3-5 days between bowel movements?      23. Preferred LOCAL Pharmacy for Pre Prescription    [ LIST ONLY ONE PHARMACY]     [id number] - [facility] [address] [place] [facility] [address]        - CLOSING REMINDERS -    Informed patient they will need an adult    Cannot take any type of public or medical transportation alone    Conscious Sedation- Needs  for 6 hours after the procedure       MAC/General-Needs  for 24 hours after procedure    Pre-Procedure Covid test to be completed [[facility] PCR Testing Required]    Confirmed Nurse will call to complete assessment       - SCHEDULING DETAILS -  n Hospital Setting Required? If yes, what is the exclusion?: n   Madisyn  Surgeon    5/15  Date of Procedure  Lower Endoscopy [Colonoscopy]  Type of Procedure Scheduled  [facility] Which Colonoscopy Prep was Sent?     mac Sedation Type     n PAC / Pre-op Required                 "

## 2023-01-22 NOTE — TELEPHONE ENCOUNTER
Routing refill request to provider for review/approval because:  Drug not on the G refill protocol   Medication is reported/historical    Last Written Prescription Date:  9/14/21  Last Fill Quantity: ,  # refills:    Last office visit provider:  12/9/22     Requested Prescriptions   Pending Prescriptions Disp Refills     latanoprost (XALATAN) 0.005 % ophthalmic solution         There is no refill protocol information for this order          Ruma Carpenter RN 01/21/23 7:52 PM

## 2023-01-23 RX ORDER — LATANOPROST 50 UG/ML
SOLUTION/ DROPS OPHTHALMIC
OUTPATIENT
Start: 2023-01-23

## 2023-01-28 ENCOUNTER — HEALTH MAINTENANCE LETTER (OUTPATIENT)
Age: 63
End: 2023-01-28

## 2023-02-10 DIAGNOSIS — Z00.00 HEALTH CARE MAINTENANCE: ICD-10-CM

## 2023-02-10 RX ORDER — PAROXETINE 20 MG/1
TABLET, FILM COATED ORAL
Qty: 90 TABLET | Refills: 0 | Status: SHIPPED | OUTPATIENT
Start: 2023-02-10 | End: 2023-05-11

## 2023-02-14 ENCOUNTER — TELEPHONE (OUTPATIENT)
Dept: OPHTHALMOLOGY | Facility: CLINIC | Age: 63
End: 2023-02-14
Payer: MEDICARE

## 2023-02-14 DIAGNOSIS — H40.1131 PRIMARY OPEN ANGLE GLAUCOMA (POAG) OF BOTH EYES, MILD STAGE: Primary | ICD-10-CM

## 2023-02-14 RX ORDER — DORZOLAMIDE HCL 20 MG/ML
1 SOLUTION/ DROPS OPHTHALMIC EVERY MORNING
Qty: 5 ML | Refills: 0 | Status: SHIPPED | OUTPATIENT
Start: 2023-02-14 | End: 2023-02-17

## 2023-02-14 RX ORDER — LATANOPROST 50 UG/ML
1 SOLUTION/ DROPS OPHTHALMIC AT BEDTIME
Qty: 2.5 ML | Refills: 0 | Status: SHIPPED | OUTPATIENT
Start: 2023-02-14 | End: 2023-02-17

## 2023-02-14 NOTE — TELEPHONE ENCOUNTER
Refill x 1 - pt overdue to be seen. LVM for pt to call us to schedule. Will leave a note for pharmacy as well.

## 2023-02-14 NOTE — TELEPHONE ENCOUNTER
[facility] Call Center    Phone Message    May a detailed message be left on voicemail: yes     Reason for Call: Medication Refill Request    Has the patient contacted the pharmacy for the refill? Yes   Name of medication being requested: Dorzolamide Solution & Latanprost  Provider who prescribed the medication:   Pharmacy: [id number] [place], [place] - [address] [place] [facility] [address]  Date medication is needed: asap         Action Taken: Message routed to:  [facility] ([facility] eye    Travel Screening: Not Applicable

## 2023-02-17 ENCOUNTER — OFFICE VISIT (OUTPATIENT)
Dept: OPHTHALMOLOGY | Facility: CLINIC | Age: 63
End: 2023-02-17
Payer: MEDICARE

## 2023-02-17 DIAGNOSIS — H52.13 MYOPIA WITH ASTIGMATISM AND PRESBYOPIA, BILATERAL: ICD-10-CM

## 2023-02-17 DIAGNOSIS — H40.1131 PRIMARY OPEN ANGLE GLAUCOMA (POAG) OF BOTH EYES, MILD STAGE: Primary | ICD-10-CM

## 2023-02-17 DIAGNOSIS — H52.4 MYOPIA WITH ASTIGMATISM AND PRESBYOPIA, BILATERAL: ICD-10-CM

## 2023-02-17 DIAGNOSIS — H52.203 MYOPIA WITH ASTIGMATISM AND PRESBYOPIA, BILATERAL: ICD-10-CM

## 2023-02-17 PROCEDURE — 92015 DETERMINE REFRACTIVE STATE: CPT | Mod: GY | Performed by: STUDENT IN AN ORGANIZED HEALTH CARE EDUCATION/TRAINING PROGRAM

## 2023-02-17 PROCEDURE — 92014 COMPRE OPH EXAM EST PT 1/>: CPT | Performed by: STUDENT IN AN ORGANIZED HEALTH CARE EDUCATION/TRAINING PROGRAM

## 2023-02-17 RX ORDER — LATANOPROST 50 UG/ML
1 SOLUTION/ DROPS OPHTHALMIC AT BEDTIME
Qty: 2.5 ML | Refills: 11 | Status: SHIPPED | OUTPATIENT
Start: 2023-02-17 | End: 2024-08-16

## 2023-02-17 RX ORDER — DORZOLAMIDE HCL 20 MG/ML
1 SOLUTION/ DROPS OPHTHALMIC EVERY MORNING
Qty: 5 ML | Refills: 11 | Status: SHIPPED | OUTPATIENT
Start: 2023-02-17 | End: 2024-04-19

## 2023-02-17 ASSESSMENT — VISUAL ACUITY
OS_CC: 20/40
CORRECTION_TYPE: GLASSES
OD_CC+: -1
OS_CC+: -1
METHOD: SNELLEN - LINEAR
OS_PH_CC: 20/25
OD_CC: 20/20

## 2023-02-17 ASSESSMENT — CONF VISUAL FIELD
OD_NORMAL: 1
METHOD: COUNTING FINGERS
OS_INFERIOR_NASAL_RESTRICTION: 0
OD_INFERIOR_NASAL_RESTRICTION: 0
OD_SUPERIOR_TEMPORAL_RESTRICTION: 0
OS_NORMAL: 1
OD_SUPERIOR_NASAL_RESTRICTION: 0
OS_SUPERIOR_TEMPORAL_RESTRICTION: 0
OS_SUPERIOR_NASAL_RESTRICTION: 0
OS_INFERIOR_TEMPORAL_RESTRICTION: 0
OD_INFERIOR_TEMPORAL_RESTRICTION: 0

## 2023-02-17 ASSESSMENT — REFRACTION_MANIFEST
OS_SPHERE: -2.00
OD_AXIS: 015
OD_SPHERE: -1.00
OD_ADD: +2.75
OS_ADD: +2.75
OS_CYLINDER: +0.50
OD_CYLINDER: +0.75
OS_AXIS: 150

## 2023-02-17 ASSESSMENT — REFRACTION_WEARINGRX
OD_CYLINDER: +0.50
SPECS_TYPE: PAL
OD_SPHERE: -1.25
OS_SPHERE: -2.25
OS_ADD: +2.50
OD_AXIS: 001
OD_ADD: +2.50
OS_CYLINDER: SPHERE

## 2023-02-17 ASSESSMENT — CUP TO DISC RATIO
OS_RATIO: 0.5
OD_RATIO: 0.6

## 2023-02-17 ASSESSMENT — TONOMETRY
OD_IOP_MMHG: 17
OS_IOP_MMHG: 17
IOP_METHOD: APPLANATION

## 2023-02-17 ASSESSMENT — EXTERNAL EXAM - RIGHT EYE: OD_EXAM: NORMAL

## 2023-02-17 ASSESSMENT — SLIT LAMP EXAM - LIDS: COMMENTS: 1+ BLEPHARITIS

## 2023-02-17 ASSESSMENT — EXTERNAL EXAM - LEFT EYE: OS_EXAM: NORMAL

## 2023-02-17 NOTE — LETTER
2/17/2023         RE: Zhen Sherman  [address]  [place] [address]        Dear Colleague,    Thank you for referring your patient, Zhen Sherman, to the [facility]. Please see a copy of my visit note below.     Current Eye Medications:  Dorzolamide every morning both eyes, last took at 6 am. Latanoprost every morning both eyes, last took at 6:30 am.     Subjective:  Complete eye exam. Vision is doing well both eyes in the distance. Takes glasses off when reading, works better. No eye pain or discomfort in either eye.      Objective:  See Ophthalmology Exam.      Assessment:  Zhen Sherman is a 62 year old male who presents with:   Encounter Diagnoses   Name Primary?     Primary open angle glaucoma (POAG) of both eyes, mild stage Intraocular pressure 17/17 today. Continue same medications.     Myopia with astigmatism and presbyopia, bilateral        Plan:  Continue Latanoprost (green top) daily both eyes     Continue dorzolamide (orange top) every morning in both eyes     Glasses prescription given - optional to update    Newton Kumar MD  [phone]              Again, thank you for allowing me to participate in the care of your patient.        Sincerely,        Newton Kumar MD

## 2023-02-17 NOTE — PROGRESS NOTES
Current Eye Medications:  Latanoprost - both eyes at bedtime - last dose: ***  Dorzolamide - both eyes QAM - last dose: ***.      Subjective:  ***     Objective:  See Ophthalmology Exam.       Assessment:      Plan:   See Patient Instructions.

## 2023-02-17 NOTE — PATIENT INSTRUCTIONS
Continue Latanoprost (green top) daily both eyes     Continue dorzolamide (orange top) every morning in both eyes     Glasses prescription given - optional to update    Newton Kumar MD  [phone]

## 2023-02-17 NOTE — PROGRESS NOTES
Current Eye Medications:  Dorzolamide every morning both eyes, last took at 6 am. Latanoprost every morning both eyes, last took at 6:30 am.     Subjective:  Complete eye exam. Vision is doing well both eyes in the distance. Takes glasses off when reading, works better. No eye pain or discomfort in either eye.      Objective:  See Ophthalmology Exam.      Assessment:  Zhen Sherman is a 62 year old male who presents with:   Encounter Diagnoses   Name Primary?     Primary open angle glaucoma (POAG) of both eyes, mild stage Intraocular pressure 17/17 today. Continue same medications.     Myopia with astigmatism and presbyopia, bilateral        Plan:  Continue Latanoprost (green top) daily both eyes     Continue dorzolamide (orange top) every morning in both eyes     Glasses prescription given - optional to update    Newton Kumar MD  [phone]

## 2023-03-30 ENCOUNTER — TELEPHONE (OUTPATIENT)
Dept: FAMILY MEDICINE | Facility: CLINIC | Age: 63
End: 2023-03-30
Payer: MEDICARE

## 2023-03-30 NOTE — TELEPHONE ENCOUNTER
[facility] insurance calling to get his last office visit date and upcoming appointment dates.

## 2023-04-24 DIAGNOSIS — Z12.11 ENCOUNTER FOR SCREENING COLONOSCOPY: Primary | ICD-10-CM

## 2023-04-24 RX ORDER — BISACODYL 5 MG/1
TABLET, DELAYED RELEASE ORAL
Qty: 4 TABLET | Refills: 0 | Status: SHIPPED | OUTPATIENT
Start: 2023-04-24 | End: 2024-01-31

## 2023-05-11 DIAGNOSIS — Z00.00 HEALTH CARE MAINTENANCE: ICD-10-CM

## 2023-05-11 RX ORDER — PAROXETINE 20 MG/1
TABLET, FILM COATED ORAL
Qty: 90 TABLET | Refills: 1 | Status: SHIPPED | OUTPATIENT
Start: 2023-05-11 | End: 2023-11-07

## 2023-05-11 NOTE — TELEPHONE ENCOUNTER
"Last Written Prescription Date:  2/10/23  Last Fill Quantity: 90  # refills: 0  Last office visit provider:  12/9/22    Requested Prescriptions   Pending Prescriptions Disp Refills     PARoxetine (PAXIL) 20 MG tablet [Pharmacy Med Name: PAROXETINE 20MG TABLETS] 90 tablet 0     Sig: TAKE 1 TABLET(20 MG) BY MOUTH EVERY MORNING       SSRIs Protocol Passed - 5/11/2023  5:22 AM        Passed - Recent (12 mo) or future (30 days) visit within the authorizing provider's specialty     Patient has had an office visit with the authorizing provider or a provider within the authorizing providers department within the previous 12 mos or has a future within next 30 days. See \"Patient Info\" tab in inbasket, or \"Choose Columns\" in Meds & Orders section of the refill encounter.              Passed - Medication is active on med list        Passed - Patient is age 18 or older             Elsa Hassan RN 05/11/23 4:35 PM  "

## 2023-05-16 ENCOUNTER — OFFICE VISIT (OUTPATIENT)
Dept: OPHTHALMOLOGY | Facility: CLINIC | Age: 63
End: 2023-05-16
Payer: MEDICARE

## 2023-05-16 DIAGNOSIS — H40.1131 PRIMARY OPEN ANGLE GLAUCOMA (POAG) OF BOTH EYES, MILD STAGE: Primary | ICD-10-CM

## 2023-05-16 PROCEDURE — 92012 INTRM OPH EXAM EST PATIENT: CPT | Performed by: STUDENT IN AN ORGANIZED HEALTH CARE EDUCATION/TRAINING PROGRAM

## 2023-05-16 ASSESSMENT — EXTERNAL EXAM - LEFT EYE: OS_EXAM: NORMAL

## 2023-05-16 ASSESSMENT — TONOMETRY
IOP_METHOD: APPLANATION
OD_IOP_MMHG: 12
OS_IOP_MMHG: 12

## 2023-05-16 ASSESSMENT — CUP TO DISC RATIO
OS_RATIO: 0.5
OD_RATIO: 0.6

## 2023-05-16 ASSESSMENT — VISUAL ACUITY
OS_CC: 20/20
METHOD: SNELLEN - LINEAR
OD_CC: 20/20
CORRECTION_TYPE: GLASSES

## 2023-05-16 ASSESSMENT — EXTERNAL EXAM - RIGHT EYE: OD_EXAM: NORMAL

## 2023-05-16 ASSESSMENT — SLIT LAMP EXAM - LIDS: COMMENTS: 1+ BLEPHARITIS

## 2023-05-16 NOTE — PROGRESS NOTES
Current Eye Medications:  Latanoprost - both eyes at bedtime - last dose: 8pm yesterday  Dorzolamide - both eyes every morning - last dose: 5:30am today     Subjective:  Here for HVF, OCT and IOP - vision is stable both eyes. Last complete eye exam was about 3 years ago.     Objective:  See Ophthalmology Exam.      Assessment:  Zhen Sherman is a 62 year old male who presents with:   Encounter Diagnosis   Name Primary?     Primary open angle glaucoma (POAG) of both eyes, mild stage Intraocular pressure 12/12 today. Stable OCT and normal visual field. Continue same medications.    OCT optic nerve: avg retinal nerve fiber layer 76/73; borderline sup and thin T both eyes - stable both eyes.     Chung visual field (HVF) 24-2: scattered loss/within normal limits both eyes        Plan:  Continue Latanoprost (green top) at bedtime both eyes     Continue dorzolamide (orange top) every morning in both eyes     Return for complete eye exam or intraocular pressure check in 6 months    Newton Kumar MD  [phone]

## 2023-05-16 NOTE — PATIENT INSTRUCTIONS
Continue Latanoprost (green top) at bedtime both eyes     Continue dorzolamide (orange top) every morning in both eyes     Return for complete eye exam or intraocular pressure check in 6 months    Newton Kumar MD  [phone]

## 2023-05-16 NOTE — LETTER
5/16/2023         RE: Zhen Sherman  [address]  [place] [address]        Dear Colleague,    Thank you for referring your patient, Zhen Sherman, to the [facility]. Please see a copy of my visit note below.     Current Eye Medications:  Latanoprost - both eyes at bedtime - last dose: 8pm yesterday  Dorzolamide - both eyes every morning - last dose: 5:30am today     Subjective:  Here for HVF, OCT and IOP - vision is stable both eyes. Last complete eye exam was about 3 years ago.     Objective:  See Ophthalmology Exam.      Assessment:  Zhen Sherman is a 62 year old male who presents with:   Encounter Diagnosis   Name Primary?     Primary open angle glaucoma (POAG) of both eyes, mild stage Intraocular pressure 12/12 today. Stable OCT and normal visual field. Continue same medications.    OCT optic nerve: avg retinal nerve fiber layer 76/73; borderline sup and thin T both eyes - stable both eyes.     Chung visual field (HVF) 24-2: scattered loss/within normal limits both eyes        Plan:  Continue Latanoprost (green top) at bedtime both eyes     Continue dorzolamide (orange top) every morning in both eyes     Return for complete eye exam or intraocular pressure check in 6 months    Newton Kumar MD  [phone]          Again, thank you for allowing me to participate in the care of your patient.        Sincerely,        Newton Kumar MD

## 2023-11-07 DIAGNOSIS — Z00.00 HEALTH CARE MAINTENANCE: ICD-10-CM

## 2023-11-07 RX ORDER — PAROXETINE 20 MG/1
TABLET, FILM COATED ORAL
Qty: 90 TABLET | Refills: 0 | Status: SHIPPED | OUTPATIENT
Start: 2023-11-07 | End: 2024-01-31

## 2023-11-21 ENCOUNTER — OFFICE VISIT (OUTPATIENT)
Dept: OPHTHALMOLOGY | Facility: CLINIC | Age: 63
End: 2023-11-21
Payer: MEDICARE

## 2023-11-21 DIAGNOSIS — H40.1131 PRIMARY OPEN ANGLE GLAUCOMA (POAG) OF BOTH EYES, MILD STAGE: Primary | ICD-10-CM

## 2023-11-21 PROCEDURE — 92015 DETERMINE REFRACTIVE STATE: CPT | Mod: GY | Performed by: STUDENT IN AN ORGANIZED HEALTH CARE EDUCATION/TRAINING PROGRAM

## 2023-11-21 PROCEDURE — 92012 INTRM OPH EXAM EST PATIENT: CPT | Performed by: STUDENT IN AN ORGANIZED HEALTH CARE EDUCATION/TRAINING PROGRAM

## 2023-11-21 ASSESSMENT — CONF VISUAL FIELD
OD_SUPERIOR_NASAL_RESTRICTION: 0
OS_INFERIOR_TEMPORAL_RESTRICTION: 0
OS_SUPERIOR_NASAL_RESTRICTION: 0
OD_SUPERIOR_TEMPORAL_RESTRICTION: 0
OS_SUPERIOR_TEMPORAL_RESTRICTION: 0
OD_INFERIOR_TEMPORAL_RESTRICTION: 0
OS_INFERIOR_NASAL_RESTRICTION: 0
OD_INFERIOR_NASAL_RESTRICTION: 0

## 2023-11-21 ASSESSMENT — EXTERNAL EXAM - RIGHT EYE: OD_EXAM: NORMAL

## 2023-11-21 ASSESSMENT — TONOMETRY
OS_IOP_MMHG: 13
OD_IOP_MMHG: 14
IOP_METHOD: APPLANATION

## 2023-11-21 ASSESSMENT — VISUAL ACUITY
OS_CC: 20/20
OD_CC: 20/20
CORRECTION_TYPE: GLASSES
METHOD: SNELLEN - LINEAR

## 2023-11-21 ASSESSMENT — SLIT LAMP EXAM - LIDS: COMMENTS: 1+ BLEPHARITIS

## 2023-11-21 ASSESSMENT — EXTERNAL EXAM - LEFT EYE: OS_EXAM: NORMAL

## 2023-11-21 NOTE — PATIENT INSTRUCTIONS
Continue Latanoprost (green top) every evening both eyes     Continue dorzolamide (orange top) twice a day in both eyes      Newton Kumar MD  [phone]

## 2023-11-21 NOTE — PROGRESS NOTES
Current Eye Medications:  latanoprost - both eyes daily - last dose: 9am today  Dorzolamide- both eyes QAM - last dose: 5:30am today.      Subjective: here for IOP check - vision has been stable with current glasses. No pain or discomfort either eye.  Consistent with daily use of drops.    Last CE nine mos ago - 2/14/2023      Objective:  See Ophthalmology Exam.      Assessment:  Zhen Sherman is a 63 year old male who presents with:   Encounter Diagnosis   Name Primary?    Primary open angle glaucoma (POAG) of both eyes, mild stage Intraocular pressure 14/13 today. Continue same medications.       Plan:  Continue Latanoprost (green top) every evening both eyes     Continue dorzolamide (orange top) twice a day in both eyes      Newton Kumar MD  [phone]

## 2023-11-21 NOTE — LETTER
11/21/2023         RE: Zhen Sherman  [address]  [place] [address]        Dear Colleague,    Thank you for referring your patient, Zhen Sherman, to the [facility]. Please see a copy of my visit note below.     Current Eye Medications:  latanoprost - both eyes daily - last dose: 9am today  Dorzolamide- both eyes QAM - last dose: 5:30am today.      Subjective: here for IOP check - vision has been stable with current glasses. No pain or discomfort either eye.  Consistent with daily use of drops.    Last CE nine mos ago - 2/14/2023      Objective:  See Ophthalmology Exam.      Assessment:  Zhen Sherman is a 63 year old male who presents with:   Encounter Diagnosis   Name Primary?     Primary open angle glaucoma (POAG) of both eyes, mild stage Intraocular pressure 14/13 today. Continue same medications.       Plan:  Continue Latanoprost (green top) every evening both eyes     Continue dorzolamide (orange top) twice a day in both eyes      Newton Kumar MD  [phone]       Again, thank you for allowing me to participate in the care of your patient.        Sincerely,        Newton Kumar MD

## 2023-11-30 ENCOUNTER — HOSPITAL ENCOUNTER (EMERGENCY)
Facility: HOSPITAL | Age: 63
Discharge: HOME | End: 2023-11-30
Attending: EMERGENCY MEDICINE | Admitting: EMERGENCY MEDICINE
Payer: MEDICARE

## 2023-11-30 VITALS
HEART RATE: 120 BPM | WEIGHT: 165 LBS | DIASTOLIC BLOOD PRESSURE: 66 MMHG | RESPIRATION RATE: 16 BRPM | BODY MASS INDEX: 21.87 KG/M2 | SYSTOLIC BLOOD PRESSURE: 130 MMHG | OXYGEN SATURATION: 100 % | HEIGHT: 73 IN | TEMPERATURE: 98.8 F

## 2023-11-30 DIAGNOSIS — U07.1 COVID-19: ICD-10-CM

## 2023-11-30 PROBLEM — I69.320 APHASIA AS LATE EFFECT OF CEREBROVASCULAR ACCIDENT (CVA): Status: ACTIVE | Noted: 2018-05-03

## 2023-11-30 PROBLEM — I77.71 CAROTID ARTERY DISSECTION (H): Status: ACTIVE | Noted: 2018-04-10

## 2023-11-30 PROBLEM — F33.9 MAJOR DEPRESSION, RECURRENT (H): Status: ACTIVE | Noted: 2018-08-09

## 2023-11-30 PROBLEM — D68.59 ANTITHROMBIN III DEFICIENCY (H): Chronic | Status: ACTIVE | Noted: 2018-04-11

## 2023-11-30 LAB
FLUAV RNA SPEC QL NAA+PROBE: NEGATIVE
FLUBV RNA RESP QL NAA+PROBE: NEGATIVE
RSV RNA SPEC NAA+PROBE: NEGATIVE
SARS-COV-2 RNA RESP QL NAA+PROBE: POSITIVE

## 2023-11-30 PROCEDURE — 250N000013 HC RX MED GY IP 250 OP 250 PS 637: Performed by: EMERGENCY MEDICINE

## 2023-11-30 PROCEDURE — 99283 EMERGENCY DEPT VISIT LOW MDM: CPT

## 2023-11-30 PROCEDURE — 87637 SARSCOV2&INF A&B&RSV AMP PRB: CPT | Performed by: EMERGENCY MEDICINE

## 2023-11-30 RX ORDER — ACETAMINOPHEN 325 MG/1
975 TABLET ORAL ONCE
Status: COMPLETED | OUTPATIENT
Start: 2023-11-30 | End: 2023-11-30

## 2023-11-30 RX ADMIN — ACETAMINOPHEN 975 MG: 325 TABLET, FILM COATED ORAL at 08:49

## 2023-11-30 ASSESSMENT — ACTIVITIES OF DAILY LIVING (ADL): ADLS_ACUITY_SCORE: 35

## 2023-11-30 NOTE — ED TRIAGE NOTES
Patient started having flu like symptoms yesterday--body aches and feeling cold.  Temp was 99's at home.  Wants a Covid test.

## 2023-11-30 NOTE — ED PROVIDER NOTES
EMERGENCY DEPARTMENT ENCOUNTER      NAME: Zhen Sherman  AGE: 63 year old male  YOB: 1960  MRN: [id number]  EVALUATION DATE & TIME: 11/30/2023  8:33 AM    PCP: Huy Bruce    ED PROVIDER: Oliva Hernandez MD    Chief Complaint   Patient presents with    Flu Symptoms         FINAL IMPRESSION:  1. COVID-19          ED COURSE & MEDICAL DECISION MAKING:    Pertinent Labs & Imaging studies reviewed. (See chart for details)  63 year old male with history of depression, previous CVA/carotid artery dissection and resultant aphasia who presents to the Emergency Department for evaluation of 12 hours of generalized body aches, fever, rhinorrhea and dry cough.  Patient anxious and tearful here about the idea of potentially having COVID.  Certainly symptoms could be related to COVID but also seen is significant amount influenza, RSV at this time.  I suspect some of his anxiety is contributing to his ED visit as well.    Patient given Tylenol for body aches.  COVID/influenza/RSV swab for COVID-19.  Offered Paxlovid but declines.       ED Course as of 11/30/23 1027   Thu Nov 30, 2023   1020 SARS CoV2 PCR(!): Positive       Medical Decision Making    History:  Supplemental history from: Documented in chart, if applicable  External Record(s) reviewed: Documented in chart, if applicable.    Work Up:  Chart documentation includes differential considered and any EKGs or imaging independently interpreted by provider, see MDM  In additional to work up documented, I considered the following work up: see MDM    External consultation:  Discussion of management with another provider: ED pharmacist reviewed home medications for potential Paxlovid interactions    Complicating factors:  Care impacted by chronic illness: Cerebrovascular Disease and Mental Health  Care affected by social determinants of health: Access to Medical Care    Disposition considerations: Discharge. I recommended prescription strength medication(s)  Paxlovid, but patient declined  . See documentation for any additional details.        At the conclusion of the encounter I discussed the results of all of the tests and the disposition. The questions were answered. The patient or family acknowledged understanding and was agreeable with the care plan.      MEDICATIONS GIVEN IN THE EMERGENCY:  Medications   acetaminophen (TYLENOL) tablet 975 mg (975 mg Oral $Given 11/30/23 0836)       NEW PRESCRIPTIONS STARTED AT TODAY'S ER VISIT  New Prescriptions    No medications on file          =================================================================    HPI    Patient information was obtained from: patient    Use of Intrepreter: N/A      Zhen Sherman is a 63 year old male with pertinent medical history of depression, previous CVA/carotid artery dissection and resultant aphasia who presents body aches.  Starting last evening before bed patient began to have generalized body aches, fever, some rhinorrhea and a dry nonproductive cough.  Patient took ibuprofen 400 mg x 2 over the course of symptoms.  Patient is concerned about COVID as he was exposed to his daughter approximately 3 weeks ago when she tested positive for COVID.  Patient is fully vaccinated.      PAST MEDICAL HISTORY:  No past medical history on file.    PAST SURGICAL HISTORY:  Past Surgical History:   Procedure Laterality Date    IR CEREBRAL ANGIOGRAM  4/9/2018       CURRENT MEDICATIONS:    Prior to Admission Medications   Prescriptions Last Dose Informant Patient Reported? Taking?   PARoxetine (PAXIL) 20 MG tablet   No No   Sig: TAKE 1 TABLET(20 MG) BY MOUTH EVERY MORNING   bisacodyl (DULCOLAX) 5 MG EC tablet   No No   Sig: Two days prior to exam take two (2) tablets at 4pm. One day prior to exam take two (2) tablets at 4pm   Patient not taking: Reported on 11/21/2023   dorzolamide (TRUSOPT) 2 % ophthalmic solution   No No   Sig: Place 1 drop into both eyes every morning   ferrous sulfate (FEROSUL) 325  "(65 Fe) MG tablet   No No   Sig: Take 1 tablet (325 mg) by mouth daily (with breakfast)   latanoprost (XALATAN) 0.005 % ophthalmic solution   No No   Sig: Place 1 drop into both eyes At Bedtime   polyethylene glycol (GOLYTELY) 236 g suspension   No No   Sig: Take as directed. Two days before your exam fill the first container with water. Cover and shake until mixed well. At 5:00pm drink one 8oz glass every 10-15 minutes until half (1/2) of the first container is empty. Store the remainder in the refrigerator. One day before your exam at 5:00pm drink the second half of the first container until it is gone. Before you go to bed mix the second container with water and put in refrigerator. Six hours before your check in time drink one 8oz glass every 10-15 minutes until half of container is empty. Discard the remainder of solution.   Patient not taking: Reported on 11/21/2023   traZODone (DESYREL) 50 MG tablet   No No   Sig: TAKE 1 AND 1/2 TABLETS BY MOUTH EVERY NIGHT AT BEDTIME      Facility-Administered Medications: None       ALLERGIES:  Allergies   Allergen Reactions    Pollen Extracts [Pollen Extract] Headache       FAMILY HISTORY:  Family History   Problem Relation Age of Onset    Glaucoma Mother     Macular Degeneration No family hx of        SOCIAL HISTORY:  Social History     Tobacco Use    Smoking status: Never    Smokeless tobacco: Never        VITALS:  Patient Vitals for the past 24 hrs:   BP Temp Temp src Pulse Resp SpO2 Height Weight   11/30/23 0958 -- -- -- 112 -- 99 % -- --   11/30/23 0855 -- -- -- -- -- 98 % -- --   11/30/23 0853 -- -- -- 109 -- 93 % -- --   11/30/23 0829 (!) 149/79 98.8  F (37.1  C) Oral -- 16 -- 1.854 m (6' 1\") 74.8 kg (165 lb)       PHYSICAL EXAM    General Appearance: Well-appearing, well-nourished, anxious, tearful  Head:  Normocephalic  Neck:  Supple  Cardio: Minimally tachycardic in the 100s, regular rhythm  Pulm:  No respiratory distress, clear to auscultation " bilaterally  Extremities: Was all extremities normally, normal gait.  Raynaud's with peripherally clamped down vasculature of the fingers bilaterally  Skin:  Skin warm, dry, no rashes  Neuro:  Alert and oriented ×3     RADIOLOGY/LABS:  Reviewed all pertinent imaging. Please see official radiology report. All pertinent labs reviewed and interpreted.    Results for orders placed or performed during the hospital encounter of 11/30/23   Symptomatic Influenza A/B, RSV, & SARS-CoV2 PCR (COVID-19) Nasopharyngeal    Specimen: Nasopharyngeal; Swab   Result Value Ref Range    Influenza A PCR Negative Negative    Influenza B PCR Negative Negative    RSV PCR Negative Negative    SARS CoV2 PCR Positive (A) Negative       Oliva Hernandez MD  Emergency Medicine  [facility] EMERGENCY DEPARTMENT  [address] [facility] [id number] [phone]  Dept: [phone]     Oliva Hernandez MD  11/30/23 5083

## 2024-01-25 ASSESSMENT — ENCOUNTER SYMPTOMS
DIZZINESS: 0
SHORTNESS OF BREATH: 0
DYSURIA: 0
SORE THROAT: 0
FREQUENCY: 0
JOINT SWELLING: 0
HEARTBURN: 0
NERVOUS/ANXIOUS: 0
CONSTIPATION: 0
COUGH: 0
HEADACHES: 0
FEVER: 0
HEMATURIA: 0
DIARRHEA: 0
PALPITATIONS: 0
NAUSEA: 0
MYALGIAS: 0
ABDOMINAL PAIN: 0
CHILLS: 0
PARESTHESIAS: 0
WEAKNESS: 0
HEMATOCHEZIA: 0
EYE PAIN: 0
ARTHRALGIAS: 0

## 2024-01-25 ASSESSMENT — ACTIVITIES OF DAILY LIVING (ADL): CURRENT_FUNCTION: NO ASSISTANCE NEEDED

## 2024-01-31 ENCOUNTER — OFFICE VISIT (OUTPATIENT)
Dept: FAMILY MEDICINE | Facility: CLINIC | Age: 64
End: 2024-01-31
Payer: MEDICARE

## 2024-01-31 VITALS
RESPIRATION RATE: 16 BRPM | BODY MASS INDEX: 22 KG/M2 | DIASTOLIC BLOOD PRESSURE: 90 MMHG | OXYGEN SATURATION: 99 % | SYSTOLIC BLOOD PRESSURE: 148 MMHG | HEART RATE: 80 BPM | HEIGHT: 73 IN | WEIGHT: 166 LBS | TEMPERATURE: 98.2 F

## 2024-01-31 DIAGNOSIS — Z12.5 SCREENING FOR PROSTATE CANCER: ICD-10-CM

## 2024-01-31 DIAGNOSIS — I69.320 APHASIA AS LATE EFFECT OF CEREBROVASCULAR ACCIDENT (CVA): ICD-10-CM

## 2024-01-31 DIAGNOSIS — Z00.00 HEALTH CARE MAINTENANCE: ICD-10-CM

## 2024-01-31 DIAGNOSIS — I77.71 CAROTID ARTERY DISSECTION (H): ICD-10-CM

## 2024-01-31 DIAGNOSIS — D50.9 IRON DEFICIENCY ANEMIA, UNSPECIFIED IRON DEFICIENCY ANEMIA TYPE: ICD-10-CM

## 2024-01-31 DIAGNOSIS — F33.41 RECURRENT MAJOR DEPRESSIVE DISORDER, IN PARTIAL REMISSION (H): ICD-10-CM

## 2024-01-31 DIAGNOSIS — D68.59 ANTITHROMBIN III DEFICIENCY (H): ICD-10-CM

## 2024-01-31 DIAGNOSIS — Z12.11 SCREEN FOR COLON CANCER: Primary | ICD-10-CM

## 2024-01-31 PROCEDURE — 99396 PREV VISIT EST AGE 40-64: CPT | Performed by: FAMILY MEDICINE

## 2024-01-31 PROCEDURE — 99213 OFFICE O/P EST LOW 20 MIN: CPT | Mod: 25 | Performed by: FAMILY MEDICINE

## 2024-01-31 RX ORDER — PAROXETINE 20 MG/1
20 TABLET, FILM COATED ORAL EVERY MORNING
Qty: 90 TABLET | Refills: 3 | Status: SHIPPED | OUTPATIENT
Start: 2024-01-31

## 2024-01-31 ASSESSMENT — PATIENT HEALTH QUESTIONNAIRE - PHQ9
SUM OF ALL RESPONSES TO PHQ QUESTIONS 1-9: 3
SUM OF ALL RESPONSES TO PHQ QUESTIONS 1-9: 3
10. IF YOU CHECKED OFF ANY PROBLEMS, HOW DIFFICULT HAVE THESE PROBLEMS MADE IT FOR YOU TO DO YOUR WORK, TAKE CARE OF THINGS AT HOME, OR GET ALONG WITH OTHER PEOPLE: NOT DIFFICULT AT ALL

## 2024-01-31 NOTE — PROGRESS NOTES
Preventive Care Visit  [facility]  Huy Bruce MD, Family Medicine  Jan 31, 2024    Assessment & Plan     Health care maintenance  Patient will return for fasting labs  - CBC with platelets  - Ferritin  - Comprehensive metabolic panel (BMP + Alb, Alk Phos, ALT, AST, Total. 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)  Ongoing problems with aphasia and difficulty with ADL status post stroke  Patient will return with paperwork to be filled out for ongoing disability    Carotid artery dissection (H24)  History of dissection in the past  Currently monitoring lipids and try to maintain good control of blood pressure    Recurrent major depressive disorder, in partial remission (H24)  Patient currently on SSRI doing well    Antithrombin III deficiency (H24)  History found to have Antithrombin III deficiency  Monitoring blood levels he has had anemia with iron deficiency      Iron deficiency anemia, unspecified iron deficiency anemia type  Patient has been taking iron supplements more in the winter but has not been taking them year-round  Discussed with the importance of likely needed year-round he eats a very close to vegan diet  Will check iron levels and blood work when he returns fasting        Counseling  Appropriate preventive services were discussed with this patient, including applicable screening as appropriate for fall prevention, nutrition, physical activity, Tobacco-use cessation, weight loss and cognition.  Checklist reviewing preventive services available has been given to the patient.  Updated plan of care.  Patient  "reported difficulty with activities of daily living were addressed today.    Brittany Fontaine is a 63 year old, presenting for the following:  Wellness Visit (Not fasting )        1/31/2024     2:12 PM   Additional Questions   Roomed by Kayce NORMAN CMA         Health Care Directive  Patient does not have a Health Care Directive or Living Will: Patient states has Advance Directive and will bring in a copy to clinic.    Healthy Habits:     In general, how would you rate your overall health?  Good    Frequency of exercise:  4-5 days/week    Duration of exercise:  30-45 minutes    Do you usually eat at least 4 servings of fruit and vegetables a day, include whole grains    & fiber and avoid regularly eating high fat or \"junk\" foods?  Yes    Taking medications regularly:  Yes    Medication side effects:  Not applicable    Ability to successfully perform activities of daily living:  No assistance needed    Home Safety:  No safety concerns identified    Hearing Impairment:  No hearing concerns    In the past 6 months, have you been bothered by leaking of urine?  No    In general, how would you rate your overall mental or emotional health?  Fair    Additional concerns today:  No    Patient is here for annual exam.  He is not fasting return at a future date for fasting blood work.  Patient continues to have problems with aphasia status post stroke and carotid artery dissection.  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  He has paperwork to be filled out but forgot it at home today.  He is on SSRI feels current dosing is working well.  He has anemia that was iron deficiency found a while ago was placed on iron supplements but is not been taking them year-round.  He did not follow-up with further lab work as well.  I discussed with him with his diet he likely will need to be on iron supplements.  He is not having problems constipation.  History of Antithrombin III deficiency-will continue " to monitor hemoglobin levels and platelets.  Continue screen for prostate cancer is due for colon cancer screening.  Up-to-date on immunizations.        1/25/2024   Social Factors   Worry food won't last until get money to buy more No   Food not last or not have enough money for food? No   Do you have housing?  Yes   Are you worried about losing your housing? No   Lack of transportation? No   Unable to get utilities (heat,electricity)? No          No data to display               Today's PHQ-9 Score:       1/31/2024     1:59 PM   PHQ-9 SCORE   PHQ-9 Total Score MyChart 3 (Minimal depression)   PHQ-9 Total Score 3         1/25/2024   Substance Use   Alcohol more than 3/day or more than 7/wk No     Social History     Tobacco Use    Smoking status: Never    Smokeless tobacco: Never   Vaping Use    Vaping Use: Never used       Last PSA:   Prostate Specific Antigen Screen   Date Value Ref Range Status   12/09/2022 2.30 0.00 - 4.50 ng/mL Final   09/14/2021 2.42 0.00 - 3.50 ug/L Final     The ASCVD Risk score (Bib DK, et al., 2019) failed to calculate for the following reasons:    The patient has a prior MI or stroke diagnosis            Reviewed and updated as needed this visit by Provider                      Current providers sharing in care for this patient include:  Patient Care Team:  Huy Bruce MD as PCP - General (Family Medicine)  Karen Silva MD as MD (Physical Medicine and Rehabilitation)  Huy Bruce MD as Assigned PCP  Newton Kumar MD as Assigned Surgical Provider  Newton Kumar MD as MD (Ophthalmology)    The following health maintenance items are reviewed in Epic and correct as of today:  Health Maintenance   Topic Date Due    ANNUAL REVIEW OF HM ORDERS  Never done    ADVANCE CARE PLANNING  Never done    DEPRESSION ACTION PLAN  Never done    COLORECTAL CANCER SCREENING  Never done    HIV SCREENING  Never done    MEDICARE ANNUAL WELLNESS VISIT  Never done     "HEPATITIS C SCREENING  Never done    ZOSTER IMMUNIZATION (1 of 2) Never done    RSV VACCINE (Pregnancy & 60+) (1 - 1-dose 60+ series) Never done    INFLUENZA VACCINE (1) 09/01/2023    COVID-19 Vaccine (5 - 2023-24 season) 09/01/2023    PHQ-9  07/31/2024    GLUCOSE  12/09/2025    LIPID  12/09/2027    DTAP/TDAP/TD IMMUNIZATION (3 - Td or Tdap) 09/14/2031    Pneumococcal Vaccine: Pediatrics (0 to 5 Years) and At-Risk Patients (6 to 64 Years)  Aged Out    IPV IMMUNIZATION  Aged Out    HPV IMMUNIZATION  Aged Out    MENINGITIS IMMUNIZATION  Aged Out    RSV MONOCLONAL ANTIBODY  Aged Out     Review of Systems   Constitutional:  Negative for chills and fever.   HENT:  Negative for congestion, ear pain, hearing loss and sore throat.    Eyes:  Negative for pain and visual disturbance.   Respiratory:  Negative for cough and shortness of breath.    Cardiovascular:  Negative for chest pain and palpitations.   Gastrointestinal:  Negative for abdominal pain, constipation, diarrhea and nausea.   Genitourinary:  Negative for dysuria, frequency, genital sores, hematuria, penile discharge and urgency.   Musculoskeletal:  Negative for arthralgias, joint swelling and myalgias.   Skin:  Negative for rash.   Neurological:  Negative for dizziness, weakness and headaches.   Psychiatric/Behavioral:  The patient is not nervous/anxious.           Objective    Exam  BP (!) 143/86 (BP Location: Left arm, Patient Position: Sitting, Cuff Size: Adult Regular)   Pulse 81   Temp 98.2  F (36.8  C) (Oral)   Resp 16   Ht 1.847 m (6' 0.72\")   Wt 75.3 kg (166 lb)   SpO2 99%   BMI 22.07 kg/m     Estimated body mass index is 22.07 kg/m  as calculated from the following:    Height as of this encounter: 1.847 m (6' 0.72\").    Weight as of this encounter: 75.3 kg (166 lb).  Physical Exam  GENERAL: alert and no distress  EYES: Eyes grossly normal to inspection, PERRL and conjunctivae and sclerae normal  HENT: ear canals and TM's normal, nose and mouth " without ulcers or lesions  RESP: lungs clear to auscultation - no rales, rhonchi or wheezes  CV: regular rate and rhythm, normal S1 S2, no S3 or S4, no murmur, click or rub, no peripheral edema  MS: no gross musculoskeletal defects noted, no edema  NEURO: Normal strength and tone, sensory exam grossly normal, and difficulty with speech at times and still makes wording  PSYCH: mentation appears normal, affect normal/bright, and confused at times due to words and understanding in conversation        1/31/2024   Mini Cog   Clock Draw Score 2 Normal   3 Item Recall 2 objects recalled   Mini Cog Total Score 4            Signed Electronically by: Huy Bruce MD    Answers submitted by the patient for this visit:  Patient Health Questionnaire (Submitted on 1/31/2024)  If you checked off any problems, how difficult have these problems made it for you to do your work, take care of things at home, or get along with other people?: Not difficult at all  PHQ9 TOTAL SCORE: 3  Annual Preventive Visit (Submitted on 1/25/2024)  Chief Complaint: Annual Exam:  Blood in stool: No  heartburn: No  peripheral edema: No  mood changes: No  Skin sensation changes: No  impotence: No

## 2024-02-01 ENCOUNTER — TELEPHONE (OUTPATIENT)
Dept: FAMILY MEDICINE | Facility: CLINIC | Age: 64
End: 2024-02-01

## 2024-02-01 ENCOUNTER — LAB (OUTPATIENT)
Dept: LAB | Facility: CLINIC | Age: 64
End: 2024-02-01
Payer: MEDICARE

## 2024-02-01 DIAGNOSIS — Z00.00 HEALTH CARE MAINTENANCE: ICD-10-CM

## 2024-02-01 DIAGNOSIS — Z12.5 SCREENING FOR PROSTATE CANCER: ICD-10-CM

## 2024-02-01 LAB
ALBUMIN SERPL BCG-MCNC: 4 G/DL (ref 3.5–5.2)
ALP SERPL-CCNC: 162 U/L (ref 40–150)
ALT SERPL W P-5'-P-CCNC: 9 U/L (ref 0–70)
ANION GAP SERPL CALCULATED.3IONS-SCNC: 11 MMOL/L (ref 7–15)
AST SERPL W P-5'-P-CCNC: 19 U/L (ref 0–45)
BILIRUB SERPL-MCNC: 0.3 MG/DL
BUN SERPL-MCNC: 9.9 MG/DL (ref 8–23)
CALCIUM SERPL-MCNC: 9.3 MG/DL (ref 8.8–10.2)
CHLORIDE SERPL-SCNC: 105 MMOL/L (ref 98–107)
CHOLEST SERPL-MCNC: 97 MG/DL
CREAT SERPL-MCNC: 0.8 MG/DL (ref 0.67–1.17)
DEPRECATED HCO3 PLAS-SCNC: 25 MMOL/L (ref 22–29)
EGFRCR SERPLBLD CKD-EPI 2021: >90 ML/MIN/1.73M2
ERYTHROCYTE [DISTWIDTH] IN BLOOD BY AUTOMATED COUNT: 15.3 % (ref 10–15)
FASTING STATUS PATIENT QL REPORTED: YES
FERRITIN SERPL-MCNC: 19 NG/ML (ref 31–409)
GLUCOSE SERPL-MCNC: 85 MG/DL (ref 70–99)
HCT VFR BLD AUTO: 34.3 % (ref 40–53)
HDLC SERPL-MCNC: 41 MG/DL
HGB BLD-MCNC: 10.3 G/DL (ref 13.3–17.7)
LDLC SERPL CALC-MCNC: 46 MG/DL
MCH RBC QN AUTO: 24.7 PG (ref 26.5–33)
MCHC RBC AUTO-ENTMCNC: 30 G/DL (ref 31.5–36.5)
MCV RBC AUTO: 82 FL (ref 78–100)
NONHDLC SERPL-MCNC: 56 MG/DL
PLATELET # BLD AUTO: 372 10E3/UL (ref 150–450)
POTASSIUM SERPL-SCNC: 4.2 MMOL/L (ref 3.4–5.3)
PROT SERPL-MCNC: 7.5 G/DL (ref 6.4–8.3)
PSA SERPL DL<=0.01 NG/ML-MCNC: 2.82 NG/ML (ref 0–4.5)
RBC # BLD AUTO: 4.17 10E6/UL (ref 4.4–5.9)
SODIUM SERPL-SCNC: 141 MMOL/L (ref 135–145)
TRIGL SERPL-MCNC: 52 MG/DL
WBC # BLD AUTO: 6.6 10E3/UL (ref 4–11)

## 2024-02-01 PROCEDURE — 80061 LIPID PANEL: CPT

## 2024-02-01 PROCEDURE — 82728 ASSAY OF FERRITIN: CPT | Mod: GZ

## 2024-02-01 PROCEDURE — G0103 PSA SCREENING: HCPCS

## 2024-02-01 PROCEDURE — 80053 COMPREHEN METABOLIC PANEL: CPT

## 2024-02-01 PROCEDURE — 36415 COLL VENOUS BLD VENIPUNCTURE: CPT

## 2024-02-01 PROCEDURE — 85027 COMPLETE CBC AUTOMATED: CPT | Mod: GZ

## 2024-02-01 ASSESSMENT — ACTIVITIES OF DAILY LIVING (ADL): CURRENT_FUNCTION: NO ASSISTANCE NEEDED

## 2024-02-01 ASSESSMENT — ENCOUNTER SYMPTOMS
FREQUENCY: 0
NERVOUS/ANXIOUS: 0
DIARRHEA: 0
HEADACHES: 0
WEAKNESS: 0
NAUSEA: 0
DYSURIA: 0
COUGH: 0
JOINT SWELLING: 0
ARTHRALGIAS: 0
HEMATURIA: 0
EYE PAIN: 0
PALPITATIONS: 0
CHILLS: 0
MYALGIAS: 0
SORE THROAT: 0
CONSTIPATION: 0
DIZZINESS: 0
FEVER: 0
ABDOMINAL PAIN: 0
SHORTNESS OF BREATH: 0

## 2024-02-02 NOTE — TELEPHONE ENCOUNTER
Forms/Letter Request    Type of form/letter: Medical opinion      Do we have the form/letter: Yes: Pt dropped off at clinic today.    Who is the form from? Insurance comp    Where did/will the form come from? Patient or family brought in       When is form/letter needed by: N/A    How would you like the form/letter returned:     Patient Notified form requests are processed in 5-7 business days:Yes    Could we send this information to you in [employer] or would you prefer to receive a phone call?:   Patient would prefer a phone call   Okay to leave a detailed message?: Yes at Cell number on file:    Telephone Information:   Mobile [phone]   Mobile [phone]      
Form sitting in your in-basket.   
Left detailed message stating form is complete and ready for pick-up.    Form placed at  and copy sent to scan.   
Please inform patient that form is ready please place copy to scan and original at front for him to   Form given to Deidre NORMAN  
Risks/benefits discussed with patient or patient surrogate

## 2024-02-07 ENCOUNTER — TELEPHONE (OUTPATIENT)
Dept: GASTROENTEROLOGY | Facility: CLINIC | Age: 64
End: 2024-02-07
Payer: MEDICARE

## 2024-02-07 ENCOUNTER — PREP FOR PROCEDURE (OUTPATIENT)
Dept: SURGERY | Facility: CLINIC | Age: 64
End: 2024-02-07
Payer: MEDICARE

## 2024-02-07 DIAGNOSIS — Z12.11 SCREEN FOR COLON CANCER: Primary | ICD-10-CM

## 2024-02-07 NOTE — TELEPHONE ENCOUNTER
"Endoscopy Scheduling Screen    Have you had a positive Covid test in the last 14 days?  No    Are you active on MyChart?   Yes    What insurance is in the chart?  Other:  Medicare/[employer]    Ordering/Referring Provider: VINITA LINDO    (If ordering provider performs procedure, schedule with ordering provider unless otherwise instructed. )    BMI: Estimated body mass index is 22.07 kg/m  as calculated from the following:    Height as of 1/31/24: 1.847 m (6' 0.72\").    Weight as of 1/31/24: 75.3 kg (166 lb).     Sedation Ordered  moderate sedation.   If patient BMI > 50 do not schedule in [facility].    If patient BMI > 45 do not schedule at [facility].    Are you taking methadone or Suboxone?  No    Have you had difficulties, pain, or discomfort during past endoscopy procedures?  No    Are you taking any prescription medications for pain 3 or more times per week?   NO - No RN review required.    Do you have a history of malignant hyperthermia or adverse reaction to anesthesia?  No    (Females) Are you currently pregnant?   No     Have you been diagnosed or told you have pulmonary hypertension?   No    Do you have an LVAD?  No    Have you been told you have moderate to severe sleep apnea?  No    Have you been told you have COPD, asthma, or any other lung disease?  No    Do you have any heart conditions?  No     Have you ever had an organ transplant?   No    Have you ever had or are you awaiting a heart or lung transplant?   No    Have you had a stroke or transient ischemic attack (TIA aka \"mini stroke\" in the last 6 months?   No    Have you been diagnosed with or been told you have cirrhosis of the liver?   No    Are you currently on dialysis?   No    Do you need assistance transferring?   No    BMI: Estimated body mass index is 22.07 kg/m  as calculated from the following:    Height as of 1/31/24: 1.847 m (6' 0.72\").    Weight as of 1/31/24: 75.3 kg (166 lb).     Is patients BMI > 40 and scheduling location [facility]?  No    Do you " take an injectable medication for weight loss or diabetes (excluding insulin)?  No    Do you take the medication Naltrexone?  No    Do you take blood thinners?  No       Prep   Are you currently on dialysis or do you have chronic kidney disease?  No    Do you have a diagnosis of diabetes?  No    Do you have a diagnosis of cystic fibrosis (CF)?  No    On a regular basis do you go 3 -5 days between bowel movements?  No    BMI > 40?  No    Preferred Pharmacy:    [facility][id number] [address] [place] [address]  [address] [place] [id number]  Phone: [phone] Fax: [phone]      Final Scheduling Details   Colonoscopy prep sent?  Standard MiraLAX    Procedure scheduled  Colonoscopy    Surgeon:  barbara     Date of procedure:  3/22     Pre-OP / PAC:   Yes - Patient informed of pre-op requirement.    Location  [facility]- Patient preference.    Sedation   MAC/Deep Sedation  [facility]      Patient Reminders:   You will receive a call from a Nurse to review instructions and health history.  This assessment must be completed prior to your procedure.  Failure to complete the Nurse assessment may result in the procedure being cancelled.      On the day of your procedure, please designate an adult(s) who can drive you home stay with you for the next 24 hours. The medicines used in the exam will make you sleepy. You will not be able to drive.      You cannot take public transportation, ride share services, or non-medical taxi service without a responsible caregiver.  Medical transport services are allowed with the requirement that a responsible caregiver will receive you at your destination.  We require that drivers and caregivers are confirmed prior to your procedure.

## 2024-03-19 ENCOUNTER — OFFICE VISIT (OUTPATIENT)
Dept: FAMILY MEDICINE | Facility: CLINIC | Age: 64
End: 2024-03-19
Payer: MEDICARE

## 2024-03-19 VITALS
DIASTOLIC BLOOD PRESSURE: 80 MMHG | HEIGHT: 73 IN | OXYGEN SATURATION: 100 % | TEMPERATURE: 97.4 F | HEART RATE: 60 BPM | BODY MASS INDEX: 22.19 KG/M2 | WEIGHT: 167.4 LBS | RESPIRATION RATE: 16 BRPM | SYSTOLIC BLOOD PRESSURE: 130 MMHG

## 2024-03-19 DIAGNOSIS — Z86.73 HISTORY OF STROKE: ICD-10-CM

## 2024-03-19 DIAGNOSIS — Z01.818 PREOP GENERAL PHYSICAL EXAM: Primary | ICD-10-CM

## 2024-03-19 DIAGNOSIS — Q23.81 BICUSPID AORTIC VALVE: ICD-10-CM

## 2024-03-19 DIAGNOSIS — Z12.11 SCREEN FOR COLON CANCER: ICD-10-CM

## 2024-03-19 DIAGNOSIS — D68.59 ANTITHROMBIN III DEFICIENCY (H): Chronic | ICD-10-CM

## 2024-03-19 PROCEDURE — 99214 OFFICE O/P EST MOD 30 MIN: CPT | Mod: 25 | Performed by: NURSE PRACTITIONER

## 2024-03-19 PROCEDURE — 93000 ELECTROCARDIOGRAM COMPLETE: CPT | Performed by: NURSE PRACTITIONER

## 2024-03-19 RX ORDER — RESPIRATORY SYNCYTIAL VIRUS VACCINE 120MCG/0.5
0.5 KIT INTRAMUSCULAR ONCE
Qty: 1 EACH | Refills: 0 | Status: CANCELLED | OUTPATIENT
Start: 2024-03-19 | End: 2024-03-19

## 2024-03-19 ASSESSMENT — PAIN SCALES - GENERAL: PAINLEVEL: NO PAIN (0)

## 2024-03-19 NOTE — PROGRESS NOTES
Preoperative Evaluation  [address] [address]  Phone: [phone]  Primary Provider: Huy Bruce  Pre-op Performing Provider: BRENDON SEN  Mar 19, 2024       Zhen is a 63 year old, presenting for the following:  Pre-Op Exam (Colonoscopy on 3/22/2024)        3/19/2024     8:31 AM   Additional Questions   Roomed by Lorena MONTERROSO     Surgical Information  Surgery/Procedure:   Procedure  Anesthesia   COLONOSCOPY  MAC     Surgery Location: [facility]  Surgeon:   Gia Abebe MD         Surgery Date: 3/22/2024  Time of Surgery: 1:45pm   Where patient plans to recover: At home with family  Fax number for surgical facility: Note does not need to be faxed, will be available electronically in Epic.    Assessment & Plan     The proposed surgical procedure is considered LOW risk.    Preop general physical exam    - EKG 12-lead complete w/read - Clinics    Screen for colon cancer  Reason for procedure.    Bicuspid aortic valve  .[id number]     Antithrombin III deficiency (H24)  History of.  Can cause patient to clot more easily.  Colonoscopy is considered low risk.    History of stroke       Risks and Recommendations  The patient has the following additional risks and recommendations for perioperative complications:  Anemia/Bleeding/Clotting:    - History of antithrombin III deficiency can cause patient to clot more easily.  Colonoscopy is considered low risk.    Antiplatelet or Anticoagulation Medication Instructions   - Patient is on no antiplatelet or anticoagulation medications.    Additional Medication Instructions   - SSRIs, SNRIs, TCAs, Antipsychotics: Continue without modification.     Recommendation  APPROVAL GIVEN to proceed with proposed procedure, without further diagnostic evaluation.        Subjective       HPI related to upcoming procedure: Patient presents for preop evaluation in anticipation for screening colonoscopy.          3/15/2024     3:19 PM   Preop Questions   1. Have you ever had a heart attack or stroke? YES - stroke history in 04/2018   2. Have you ever had surgery on your heart or blood vessels, such as a stent placement, a coronary artery bypass, or surgery on an artery in your head, neck, heart, or legs? No   3. Do you have chest pain with activity? No   4. Do you have a history of  heart failure? No   5. Do you currently have a cold, bronchitis or symptoms of other infection? No   6. Do you have a cough, shortness of breath, or wheezing? No   7. Do you or anyone in your family have previous history of blood clots? No   8. Do you or does anyone in your family have a serious bleeding problem such as prolonged bleeding following surgeries or cuts? No   9. Have you ever had problems with anemia or been told to take iron pills? YES - currently has low hemoglobin and low ferritin; on iron supplement but currently holding   10. Have you had any abnormal blood loss such as black, tarry or bloody stools? No   11. Have you ever had a blood transfusion? No   12. Are you willing to have a blood transfusion if it is medically needed before, during, or after your surgery? NO   13. Have you or any of your relatives ever had problems with anesthesia? No   14. Do you have sleep apnea, excessive snoring or daytime drowsiness? No   15. Do you have any artifical heart valves or other implanted medical devices like a pacemaker, defibrillator, or continuous glucose monitor? No   16. Do you have artificial joints? No   17. Are you allergic to latex? No       Health Care Directive  Patient does not have a Health Care Directive or Living Will: Patient states has Advance Directive and will bring in a copy to clinic.    Preoperative Review of    reviewed - no record of controlled substances prescribed.      Status of Chronic Conditions:  See problem list for active medical problems.  Problems all longstanding and stable, except as  noted/documented.  See ROS for pertinent symptoms related to these conditions.    Patient Active Problem List    Diagnosis Date Noted    Bicuspid aortic valve 12/09/2022     Priority: Medium    Major depression, recurrent (H24) 08/09/2018     Priority: Medium    Aphasia as late effect of cerebrovascular accident (CVA) 05/03/2018     Priority: Medium    History of stroke 04/12/2018     Priority: Medium    Antithrombin III deficiency (H24) 04/11/2018     Priority: Medium    Carotid artery dissection (H24) 04/10/2018     Priority: Medium      History reviewed. No pertinent past medical history.  Past Surgical History:   Procedure Laterality Date    IR CEREBRAL ANGIOGRAM  4/9/2018     Current Outpatient Medications   Medication Sig Dispense Refill    bisacodyl (DULCOLAX) 5 MG EC tablet Take two (2) tablet at 4 pm the day before your procedure.  If your procedure is before 11 am, take two (2) additional tablets at 8 pm.  If your procedure is after 11 am, take two (2) additional tablets at 6 am. For additional instructions refer to your colonoscopy prep instructions. 4 tablet 0    dorzolamide (TRUSOPT) 2 % ophthalmic solution Place 1 drop into both eyes every morning 5 mL 11    ferrous sulfate (FEROSUL) 325 (65 Fe) MG tablet Take 1 tablet (325 mg) by mouth daily (with breakfast) 90 tablet 0    latanoprost (XALATAN) 0.005 % ophthalmic solution Place 1 drop into both eyes At Bedtime 2.5 mL 11    PARoxetine (PAXIL) 20 MG tablet Take 1 tablet (20 mg) by mouth every morning 90 tablet 3    polyethylene glycol (MIRALAX) 17 GM/Dose powder - Mix 8.3 oz of miralax powder with 64 oz of gatorade or other sport drink (no red or purple).  For additional instructions refer to your colonoscopy prep instructions. 238 g 0    traZODone (DESYREL) 50 MG tablet TAKE 1 AND 1/2 TABLETS BY MOUTH EVERY NIGHT AT BEDTIME 135 tablet 3       Allergies   Allergen Reactions    Pollen Extracts [Pollen Extract] Headache        Social History     Tobacco  "Use    Smoking status: Never    Smokeless tobacco: Never   Substance Use Topics    Alcohol use: Not on file     Family History   Problem Relation Age of Onset    Glaucoma Mother     Macular Degeneration No family hx of      History   Drug Use Not on file         Review of Systems      Objective    /80 (BP Location: Right arm, Patient Position: Sitting, Cuff Size: Adult Large)   Pulse 60   Temp 97.4  F (36.3  C) (Oral)   Resp 16   Ht 1.848 m (6' 0.75\")   Wt 75.9 kg (167 lb 6.4 oz)   SpO2 100%   BMI 22.24 kg/m     Estimated body mass index is 22.24 kg/m  as calculated from the following:    Height as of this encounter: 1.848 m (6' 0.75\").    Weight as of this encounter: 75.9 kg (167 lb 6.4 oz).  Physical Exam  GENERAL: alert and no distress  EYES: Eyes grossly normal to inspection, PERRL and conjunctivae and sclerae normal  HENT: ear canals and TM's normal, nose and mouth without ulcers or lesions  NECK: no adenopathy, no asymmetry, masses, or scars  RESP: lungs clear to auscultation - no rales, rhonchi or wheezes  CV: regular rate and rhythm, normal S1 S2, no S3 or S4, no murmur, click or rub, no peripheral edema  SKIN: no suspicious lesions or rashes  NEURO: Normal strength and tone, mentation intact and speech normal  PSYCH: mentation appears normal, affect normal/bright    Recent Labs   Lab Test 02/01/24  0926 12/09/22  0947   HGB 10.3* 9.5*    305    142   POTASSIUM 4.2 3.9   CR 0.80 0.82        Diagnostics  No labs were ordered during this visit.   EKG: sinus bradycardia with HR 59, voltage criteria for LVH.  No previous EKG available for direct comparison but there is an EKG summary from 4/7/18 that states left atrial enlargement which is unchanged from today.    5/17/18:  Transesophageal Echocardiogram  Final Impressions  1) No right-to-left shunt at the atrial level at rest.  2) No left atrial appendage thrombus.  Excellent contractility.  3) No atherosclerosis of the aorta.  4) " Bicuspid aortic valve with strands noted.  No stenosis.  Trivial central aortic regurgitation.  5) Sinus of Valsalva dilatation (diameter 44 mm).  Mid ascending aorta 43 mm.  6) Normal left ventricular chamber size.  Estimated ejection fraction 60%.    Revised Cardiac Risk Index (RCRI)  The patient has the following serious cardiovascular risks for perioperative complications:   - No serious cardiac risks = 0 points     RCRI Interpretation: 0 points: Class I (very low risk - 0.4% complication rate)         Signed Electronically by: Dayanara Hardwick NP  Copy of this evaluation report is provided to requesting physician.

## 2024-03-19 NOTE — PATIENT INSTRUCTIONS
Pre-operation Instructions:    - Stop Aspirin and NSAIDS (Ibuprofen, Motrin, Advil, Aleve, Naproxen, Naprosyn) 7 days prior to the surgery/procedure or follow surgeon's direction.    - Stop all Vitamins and Herbal Supplements (including Vitamin E, Fish Oil, Omega 3 Fatty Acids, Ginseng, Gingko) 7 days prior to the surgery/procedure or follow surgeon's direction.    - Medications:   Continue your medications the morning of your surgery/procedure.    - If you become sick before your surgery/procedure (such as a fever, cough, congestion, or sore throat) you should call your primary care provider and surgeon.    - Unless given permission by your surgeon, do not eat or drink after midnight in the evening prior to your surgery/procedure.     Preparing for Your Surgery  Getting started  A nurse will call you to review your health history and instructions. They will give you an arrival time based on your scheduled surgery time. Please be ready to share:  Your doctor's clinic name and phone number  Your medical, surgical, and anesthesia history  A list of allergies and sensitivities  A list of medicines, including herbal treatments and over-the-counter drugs  Whether the patient has a legal guardian (ask how to send us the papers in advance)  Please tell us if you're pregnant--or if there's any chance you might be pregnant. Some surgeries may injure a fetus (unborn baby), so they require a pregnancy test. Surgeries that are safe for a fetus don't always need a test, and you can choose whether to have one.   If you have a child who's having surgery, please ask for a copy of Preparing for Your Child's Surgery.    Preparing for surgery  Within 10 to 30 days of surgery: Have a pre-op exam (sometimes called an H&P, or History and Physical). This can be done at a clinic or pre-operative center.  If you're having a , you may not need this exam. Talk to your care team.  At your pre-op exam, talk to your care team about all  medicines you take. If you need to stop any medicines before surgery, ask when to start taking them again.  We do this for your safety. Many medicines can make you bleed too much during surgery. Some change how well surgery (anesthesia) drugs work.  Call your insurance company to let them know you're having surgery. (If you don't have insurance, call [phone].)  Call your clinic if there's any change in your health. This includes signs of a cold or flu (sore throat, runny nose, cough, rash, fever). It also includes a scrape or scratch near the surgery site.  If you have questions on the day of surgery, call your hospital or surgery center.  Eating and drinking guidelines  For your safety: Unless your surgeon tells you otherwise, follow the guidelines below.  Eat and drink as usual until 8 hours before you arrive for surgery. After that, no food or milk.  Drink clear liquids until 2 hours before you arrive. These are liquids you can see through, like water, Gatorade, and Propel Water. They also include plain black coffee and tea (no cream or milk), candy, and breath mints. You can spit out gum when you arrive.  If you drink alcohol: Stop drinking it the night before surgery.  If your care team tells you to take medicine on the morning of surgery, it's okay to take it with a sip of water.  Preventing infection  Shower or bathe the night before and morning of your surgery. Follow the instructions your clinic gave you. (If no instructions, use regular soap.)  Don't shave or clip hair near your surgery site. We'll remove the hair if needed.  Don't smoke or vape the morning of surgery. You may chew nicotine gum up to 2 hours before surgery. A nicotine patch is okay.  Note: Some surgeries require you to completely quit smoking and nicotine. Check with your surgeon.  Your care team will make every effort to keep you safe from infection. We will:  Clean our hands often with soap and water (or an alcohol-based hand  rub).  Clean the skin at your surgery site with a special soap that kills germs.  Give you a special gown to keep you warm. (Cold raises the risk of infection.)  Wear special hair covers, masks, gowns and gloves during surgery.  Give antibiotic medicine, if prescribed. Not all surgeries need antibiotics.  What to bring on the day of surgery  Photo ID and insurance card  Copy of your health care directive, if you have one  Glasses and hearing aids (bring cases)  You can't wear contacts during surgery  Inhaler and eye drops, if you use them (tell us about these when you arrive)  CPAP machine or breathing device, if you use them  A few personal items, if spending the night  If you have . . .  A pacemaker, ICD (cardiac defibrillator) or other implant: Bring the ID card.  An implanted stimulator: Bring the remote control.  A legal guardian: Bring a copy of the certified (court-stamped) guardianship papers.  Please remove any jewelry, including body piercings. Leave jewelry and other valuables at home.  If you're going home the day of surgery  You must have a responsible adult drive you home. They should stay with you overnight as well.  If you don't have someone to stay with you, and you aren't safe to go home alone, we may keep you overnight. Insurance often won't pay for this.  After surgery  If it's hard to control your pain or you need more pain medicine, please call your surgeon's office.  Questions?   If you have any questions for your care team, list them here: _________________________________________________________________________________________________________________________________________________________________________ ____________________________________ ____________________________________ ____________________________________  For informational purposes only. Not to replace the advice of your health care provider. Copyright   2003, 2019 [facility]. All rights reserved. Clinically reviewed  by Lena Wagner MD. [employer] [id number] - REV 12/22.    How to Take Your Medication Before Surgery

## 2024-03-22 ENCOUNTER — ANESTHESIA EVENT (OUTPATIENT)
Dept: SURGERY | Facility: CLINIC | Age: 64
End: 2024-03-22
Payer: MEDICARE

## 2024-03-22 ENCOUNTER — HOSPITAL ENCOUNTER (OUTPATIENT)
Facility: CLINIC | Age: 64
Discharge: HOME | End: 2024-03-22
Attending: COLON & RECTAL SURGERY | Admitting: COLON & RECTAL SURGERY
Payer: MEDICARE

## 2024-03-22 ENCOUNTER — ANESTHESIA (OUTPATIENT)
Dept: SURGERY | Facility: CLINIC | Age: 64
End: 2024-03-22
Payer: MEDICARE

## 2024-03-22 VITALS
SYSTOLIC BLOOD PRESSURE: 167 MMHG | DIASTOLIC BLOOD PRESSURE: 74 MMHG | HEART RATE: 58 BPM | OXYGEN SATURATION: 99 % | TEMPERATURE: 97.4 F | RESPIRATION RATE: 16 BRPM

## 2024-03-22 LAB — COLONOSCOPY: NORMAL

## 2024-03-22 PROCEDURE — 272N000001 HC OR GENERAL SUPPLY STERILE: Performed by: COLON & RECTAL SURGERY

## 2024-03-22 PROCEDURE — 250N000011 HC RX IP 250 OP 636

## 2024-03-22 PROCEDURE — 250N000009 HC RX 250

## 2024-03-22 PROCEDURE — 258N000003 HC RX IP 258 OP 636: Performed by: ANESTHESIOLOGY

## 2024-03-22 PROCEDURE — 360N000075 HC SURGERY LEVEL 2, PER MIN: Performed by: COLON & RECTAL SURGERY

## 2024-03-22 PROCEDURE — 710N000012 HC RECOVERY PHASE 2, PER MINUTE: Performed by: COLON & RECTAL SURGERY

## 2024-03-22 PROCEDURE — 999N000141 HC STATISTIC PRE-PROCEDURE NURSING ASSESSMENT: Performed by: COLON & RECTAL SURGERY

## 2024-03-22 PROCEDURE — 370N000017 HC ANESTHESIA TECHNICAL FEE, PER MIN: Performed by: COLON & RECTAL SURGERY

## 2024-03-22 RX ORDER — OXYCODONE HYDROCHLORIDE 5 MG/1
10 TABLET ORAL
Status: DISCONTINUED | OUTPATIENT
Start: 2024-03-22 | End: 2024-03-22 | Stop reason: HOSPADM

## 2024-03-22 RX ORDER — PROPOFOL 10 MG/ML
INJECTION, EMULSION INTRAVENOUS CONTINUOUS PRN
Status: DISCONTINUED | OUTPATIENT
Start: 2024-03-22 | End: 2024-03-22

## 2024-03-22 RX ORDER — SODIUM CHLORIDE, SODIUM LACTATE, POTASSIUM CHLORIDE, CALCIUM CHLORIDE 600; 310; 30; 20 MG/100ML; MG/100ML; MG/100ML; MG/100ML
INJECTION, SOLUTION INTRAVENOUS CONTINUOUS
Status: DISCONTINUED | OUTPATIENT
Start: 2024-03-22 | End: 2024-03-22 | Stop reason: HOSPADM

## 2024-03-22 RX ORDER — ONDANSETRON 2 MG/ML
4 INJECTION INTRAMUSCULAR; INTRAVENOUS EVERY 30 MIN PRN
Status: DISCONTINUED | OUTPATIENT
Start: 2024-03-22 | End: 2024-03-22 | Stop reason: HOSPADM

## 2024-03-22 RX ORDER — LIDOCAINE 40 MG/G
CREAM TOPICAL
Status: DISCONTINUED | OUTPATIENT
Start: 2024-03-22 | End: 2024-03-22 | Stop reason: HOSPADM

## 2024-03-22 RX ORDER — OXYCODONE HYDROCHLORIDE 5 MG/1
5 TABLET ORAL
Status: DISCONTINUED | OUTPATIENT
Start: 2024-03-22 | End: 2024-03-22 | Stop reason: HOSPADM

## 2024-03-22 RX ORDER — PROPOFOL 10 MG/ML
INJECTION, EMULSION INTRAVENOUS PRN
Status: DISCONTINUED | OUTPATIENT
Start: 2024-03-22 | End: 2024-03-22

## 2024-03-22 RX ORDER — ONDANSETRON 4 MG/1
4 TABLET, ORALLY DISINTEGRATING ORAL EVERY 30 MIN PRN
Status: DISCONTINUED | OUTPATIENT
Start: 2024-03-22 | End: 2024-03-22 | Stop reason: HOSPADM

## 2024-03-22 RX ORDER — NALOXONE HYDROCHLORIDE 0.4 MG/ML
0.1 INJECTION, SOLUTION INTRAMUSCULAR; INTRAVENOUS; SUBCUTANEOUS
Status: DISCONTINUED | OUTPATIENT
Start: 2024-03-22 | End: 2024-03-22 | Stop reason: HOSPADM

## 2024-03-22 RX ORDER — ONDANSETRON 2 MG/ML
INJECTION INTRAMUSCULAR; INTRAVENOUS PRN
Status: DISCONTINUED | OUTPATIENT
Start: 2024-03-22 | End: 2024-03-22

## 2024-03-22 RX ORDER — LIDOCAINE HYDROCHLORIDE 10 MG/ML
INJECTION, SOLUTION INFILTRATION; PERINEURAL PRN
Status: DISCONTINUED | OUTPATIENT
Start: 2024-03-22 | End: 2024-03-22

## 2024-03-22 RX ADMIN — PROPOFOL 10 MG: 10 INJECTION, EMULSION INTRAVENOUS at 13:19

## 2024-03-22 RX ADMIN — SODIUM CHLORIDE, POTASSIUM CHLORIDE, SODIUM LACTATE AND CALCIUM CHLORIDE: 600; 310; 30; 20 INJECTION, SOLUTION INTRAVENOUS at 12:08

## 2024-03-22 RX ADMIN — LIDOCAINE HYDROCHLORIDE 5 ML: 10 INJECTION, SOLUTION INFILTRATION; PERINEURAL at 13:16

## 2024-03-22 RX ADMIN — PROPOFOL 20 MG: 10 INJECTION, EMULSION INTRAVENOUS at 13:18

## 2024-03-22 RX ADMIN — PROPOFOL 50 MG: 10 INJECTION, EMULSION INTRAVENOUS at 13:17

## 2024-03-22 RX ADMIN — ONDANSETRON 4 MG: 2 INJECTION INTRAMUSCULAR; INTRAVENOUS at 13:24

## 2024-03-22 RX ADMIN — PROPOFOL 150 MCG/KG/MIN: 10 INJECTION, EMULSION INTRAVENOUS at 13:17

## 2024-03-22 ASSESSMENT — ACTIVITIES OF DAILY LIVING (ADL)
ADLS_ACUITY_SCORE: 35

## 2024-03-22 NOTE — ANESTHESIA CARE TRANSFER NOTE
Patient: Zhen Sherman    Procedure: Procedure(s):  COLONOSCOPY       Diagnosis: Screen for colon cancer [Z12.11]  Diagnosis Additional Information: No value filed.    Anesthesia Type:   MAC     Note:    Oropharynx: oropharynx clear of all foreign objects and spontaneously breathing  Level of Consciousness: awake  Oxygen Supplementation: room air    Independent Airway: airway patency satisfactory and stable  Dentition: dentition unchanged  Vital Signs Stable: post-procedure vital signs reviewed and stable  Report to RN Given: handoff report given  Patient transferred to: Phase II    Handoff Report: Identifed the Patient, Identified the Reponsible Provider, Reviewed the pertinent medical history, Discussed the surgical course, Reviewed Intra-OP anesthesia mangement and issues during anesthesia, Set expectations for post-procedure period and Allowed opportunity for questions and acknowledgement of understanding      Vitals:  Vitals Value Taken Time   /69 03/22/24 1405   Temp 36.1  C (96.9  F) 03/22/24 1404   Pulse 62 03/22/24 1407   Resp 12 03/22/24 1404   SpO2 89 % 03/22/24 1407   Vitals shown include unfiled device data.    Electronically Signed By: DANY Frausto CRNA  March 22, 2024  2:08 PM

## 2024-03-22 NOTE — ANESTHESIA PREPROCEDURE EVALUATION
Anesthesia Pre-Procedure Evaluation    Patient: Zhen Sherman   MRN: [id number] : 1960        Procedure : Procedure(s):  COLONOSCOPY          Past Medical History:   Diagnosis Date    Cerebral infarction (H)       Past Surgical History:   Procedure Laterality Date    IR CEREBRAL ANGIOGRAM  2018      Allergies   Allergen Reactions    Pollen Extracts [Pollen Extract] Headache      Social History     Tobacco Use    Smoking status: Never    Smokeless tobacco: Never   Substance Use Topics    Alcohol use: Not on file      Wt Readings from Last 1 Encounters:   24 75.9 kg (167 lb 6.4 oz)        Anesthesia Evaluation   Pt has had prior anesthetic.     No history of anesthetic complications       ROS/MED HX  ENT/Pulmonary:  - neg pulmonary ROS     Neurologic:     (+)          CVA,                      Cardiovascular:     (+)  - -   -  - -                           valvular problems/murmurs (bicuspid aortic valve, no stenosis, trivial AI, mild dilatation of aortic root)           METS/Exercise Tolerance:     Hematologic:     (+)      anemia,          Musculoskeletal:       GI/Hepatic:  - neg GI/hepatic ROS     Renal/Genitourinary:  - neg Renal ROS     Endo:  - neg endo ROS     Psychiatric/Substance Use:       Infectious Disease:       Malignancy:       Other:            Physical Exam    Airway        Mallampati: II   TM distance: > 3 FB   Neck ROM: full   Mouth opening: > 3 cm    Respiratory Devices and Support         Dental       (+) Modest Abnormalities - crowns, retainers, 1 or 2 missing teeth      Cardiovascular   cardiovascular exam normal          Pulmonary   pulmonary exam normal                OUTSIDE LABS:  CBC:   Lab Results   Component Value Date    WBC 6.6 2024    WBC 6.6 2022    HGB 10.3 (L) 2024    HGB 9.5 (L) 2022    HCT 34.3 (L) 2024    HCT 31.1 (L) 2022     2024     2022     BMP:   Lab Results   Component Value Date      "02/01/2024     12/09/2022    POTASSIUM 4.2 02/01/2024    POTASSIUM 3.9 12/09/2022    CHLORIDE 105 02/01/2024    CHLORIDE 104 12/09/2022    CO2 25 02/01/2024    CO2 24 12/09/2022    BUN 9.9 02/01/2024    BUN 12.3 12/09/2022    CR 0.80 02/01/2024    CR 0.82 12/09/2022    GLC 85 02/01/2024    GLC 86 12/09/2022     COAGS:   Lab Results   Component Value Date    PTT 27 04/07/2018    INR 0.98 04/07/2018    FIBR 424 04/07/2018     POC: No results found for: \"BGM\", \"HCG\", \"HCGS\"  HEPATIC:   Lab Results   Component Value Date    ALBUMIN 4.0 02/01/2024    PROTTOTAL 7.5 02/01/2024    ALT 9 02/01/2024    AST 19 02/01/2024    ALKPHOS 162 (H) 02/01/2024    BILITOTAL 0.3 02/01/2024     OTHER:   Lab Results   Component Value Date    ORTEGA 9.3 02/01/2024    PHOS 2.6 04/08/2018    MAG 1.9 04/08/2018       Anesthesia Plan    ASA Status:  3    NPO Status:  NPO Appropriate    Anesthesia Type: MAC.              Consents    Anesthesia Plan(s) and associated risks, benefits, and realistic alternatives discussed. Questions answered and patient/representative(s) expressed understanding.     - Discussed: Risks, Benefits and Alternatives for the PROCEDURE were discussed     - Discussed with:  Patient            Postoperative Care    Pain management: IV analgesics, Oral pain medications.   PONV prophylaxis: Ondansetron (or other 5HT-3), Dexamethasone or Solumedrol     Comments:               Grant Brito MD    I have reviewed the pertinent notes and labs in the chart from the past 30 days and (re)examined the patient.  Any updates or changes from those notes are reflected in this note.                  "

## 2024-03-22 NOTE — ANESTHESIA POSTPROCEDURE EVALUATION
Patient: Zhen Sherman    Procedure: Procedure(s):  COLONOSCOPY       Anesthesia Type:  MAC    Note:     Postop Pain Control: Uneventful            Sign Out: Well controlled pain   PONV: No   Neuro/Psych: Uneventful            Sign Out: Acceptable/Baseline neuro status   Airway/Respiratory: Uneventful            Sign Out: Acceptable/Baseline resp. status   CV/Hemodynamics: Uneventful            Sign Out: Acceptable CV status; No obvious hypovolemia; No obvious fluid overload   Other NRE: NONE   DID A NON-ROUTINE EVENT OCCUR? No           Last vitals:  Vitals Value Taken Time   /74 03/22/24 1423   Temp 36.3  C (97.4  F) 03/22/24 1423   Pulse 60 03/22/24 1424   Resp 16 03/22/24 1423   SpO2 99 % 03/22/24 1424   Vitals shown include unfiled device data.    Electronically Signed By: Grant Brito MD  March 22, 2024  3:55 PM

## 2024-03-22 NOTE — PROGRESS NOTES
The History and Physical has been reviewed, the patient has been examined and no changes have occurred in the patient's condition since the H & P was completed.     Gia Worley MD

## 2024-03-24 ENCOUNTER — APPOINTMENT (OUTPATIENT)
Dept: CT IMAGING | Facility: HOSPITAL | Age: 64
End: 2024-03-24
Attending: EMERGENCY MEDICINE
Payer: MEDICARE

## 2024-03-24 ENCOUNTER — HOSPITAL ENCOUNTER (EMERGENCY)
Facility: HOSPITAL | Age: 64
Discharge: HOME | End: 2024-03-24
Attending: EMERGENCY MEDICINE | Admitting: EMERGENCY MEDICINE
Payer: MEDICARE

## 2024-03-24 VITALS
BODY MASS INDEX: 22.18 KG/M2 | DIASTOLIC BLOOD PRESSURE: 92 MMHG | HEART RATE: 74 BPM | OXYGEN SATURATION: 100 % | WEIGHT: 167 LBS | TEMPERATURE: 98.3 F | RESPIRATION RATE: 20 BRPM | SYSTOLIC BLOOD PRESSURE: 154 MMHG

## 2024-03-24 DIAGNOSIS — N20.0 KIDNEY STONE ON RIGHT SIDE: ICD-10-CM

## 2024-03-24 LAB
ALBUMIN SERPL BCG-MCNC: 3.7 G/DL (ref 3.5–5.2)
ALBUMIN UR-MCNC: NEGATIVE MG/DL
ALP SERPL-CCNC: 160 U/L (ref 40–150)
ALT SERPL W P-5'-P-CCNC: 9 U/L (ref 0–70)
ANION GAP SERPL CALCULATED.3IONS-SCNC: 12 MMOL/L (ref 7–15)
APPEARANCE UR: CLEAR
AST SERPL W P-5'-P-CCNC: 16 U/L (ref 0–45)
BASOPHILS # BLD AUTO: 0.1 10E3/UL (ref 0–0.2)
BASOPHILS NFR BLD AUTO: 1 %
BILIRUB SERPL-MCNC: <0.2 MG/DL
BILIRUB UR QL STRIP: NEGATIVE
BUN SERPL-MCNC: 14.6 MG/DL (ref 8–23)
CALCIUM SERPL-MCNC: 8.9 MG/DL (ref 8.8–10.2)
CHLORIDE SERPL-SCNC: 104 MMOL/L (ref 98–107)
COLOR UR AUTO: ABNORMAL
CREAT SERPL-MCNC: 1.13 MG/DL (ref 0.67–1.17)
DEPRECATED HCO3 PLAS-SCNC: 25 MMOL/L (ref 22–29)
EGFRCR SERPLBLD CKD-EPI 2021: 74 ML/MIN/1.73M2
EOSINOPHIL # BLD AUTO: 0.4 10E3/UL (ref 0–0.7)
EOSINOPHIL NFR BLD AUTO: 5 %
ERYTHROCYTE [DISTWIDTH] IN BLOOD BY AUTOMATED COUNT: 14.8 % (ref 10–15)
GLUCOSE SERPL-MCNC: 108 MG/DL (ref 70–99)
GLUCOSE UR STRIP-MCNC: NEGATIVE MG/DL
HCT VFR BLD AUTO: 32.9 % (ref 40–53)
HGB BLD-MCNC: 10 G/DL (ref 13.3–17.7)
HGB UR QL STRIP: ABNORMAL
HOLD SPECIMEN: NORMAL
HOLD SPECIMEN: NORMAL
IMM GRANULOCYTES # BLD: 0 10E3/UL
IMM GRANULOCYTES NFR BLD: 0 %
KETONES UR STRIP-MCNC: NEGATIVE MG/DL
LACTATE SERPL-SCNC: 0.8 MMOL/L (ref 0.7–2)
LEUKOCYTE ESTERASE UR QL STRIP: NEGATIVE
LIPASE SERPL-CCNC: 40 U/L (ref 13–60)
LYMPHOCYTES # BLD AUTO: 1.7 10E3/UL (ref 0.8–5.3)
LYMPHOCYTES NFR BLD AUTO: 19 %
MCH RBC QN AUTO: 24.9 PG (ref 26.5–33)
MCHC RBC AUTO-ENTMCNC: 30.4 G/DL (ref 31.5–36.5)
MCV RBC AUTO: 82 FL (ref 78–100)
MONOCYTES # BLD AUTO: 1.4 10E3/UL (ref 0–1.3)
MONOCYTES NFR BLD AUTO: 16 %
NEUTROPHILS # BLD AUTO: 5.3 10E3/UL (ref 1.6–8.3)
NEUTROPHILS NFR BLD AUTO: 59 %
NITRATE UR QL: NEGATIVE
NRBC # BLD AUTO: 0 10E3/UL
NRBC BLD AUTO-RTO: 0 /100
PH UR STRIP: 6.5 [PH] (ref 5–7)
PLATELET # BLD AUTO: 368 10E3/UL (ref 150–450)
POTASSIUM SERPL-SCNC: 4.3 MMOL/L (ref 3.4–5.3)
PROT SERPL-MCNC: 7.1 G/DL (ref 6.4–8.3)
RBC # BLD AUTO: 4.02 10E6/UL (ref 4.4–5.9)
RBC URINE: 23 /HPF
SODIUM SERPL-SCNC: 141 MMOL/L (ref 135–145)
SP GR UR STRIP: 1.03 (ref 1–1.03)
UROBILINOGEN UR STRIP-MCNC: <2 MG/DL
WBC # BLD AUTO: 8.9 10E3/UL (ref 4–11)
WBC URINE: 1 /HPF

## 2024-03-24 PROCEDURE — 250N000013 HC RX MED GY IP 250 OP 250 PS 637: Performed by: EMERGENCY MEDICINE

## 2024-03-24 PROCEDURE — 80053 COMPREHEN METABOLIC PANEL: CPT | Performed by: EMERGENCY MEDICINE

## 2024-03-24 PROCEDURE — 250N000011 HC RX IP 250 OP 636: Performed by: EMERGENCY MEDICINE

## 2024-03-24 PROCEDURE — 83690 ASSAY OF LIPASE: CPT | Performed by: EMERGENCY MEDICINE

## 2024-03-24 PROCEDURE — 83605 ASSAY OF LACTIC ACID: CPT | Performed by: EMERGENCY MEDICINE

## 2024-03-24 PROCEDURE — 99285 EMERGENCY DEPT VISIT HI MDM: CPT | Mod: 25

## 2024-03-24 PROCEDURE — 96374 THER/PROPH/DIAG INJ IV PUSH: CPT

## 2024-03-24 PROCEDURE — 74177 CT ABD & PELVIS W/CONTRAST: CPT | Mod: MG

## 2024-03-24 PROCEDURE — 81001 URINALYSIS AUTO W/SCOPE: CPT | Performed by: EMERGENCY MEDICINE

## 2024-03-24 PROCEDURE — 85025 COMPLETE CBC W/AUTO DIFF WBC: CPT | Performed by: EMERGENCY MEDICINE

## 2024-03-24 PROCEDURE — 36415 COLL VENOUS BLD VENIPUNCTURE: CPT | Performed by: EMERGENCY MEDICINE

## 2024-03-24 RX ORDER — KETOROLAC TROMETHAMINE 15 MG/ML
15 INJECTION, SOLUTION INTRAMUSCULAR; INTRAVENOUS ONCE
Status: COMPLETED | OUTPATIENT
Start: 2024-03-24 | End: 2024-03-24

## 2024-03-24 RX ORDER — HYDROCODONE BITARTRATE AND ACETAMINOPHEN 5; 325 MG/1; MG/1
1 TABLET ORAL ONCE
Status: COMPLETED | OUTPATIENT
Start: 2024-03-24 | End: 2024-03-24

## 2024-03-24 RX ORDER — TAMSULOSIN HYDROCHLORIDE 0.4 MG/1
0.4 CAPSULE ORAL DAILY
Qty: 7 CAPSULE | Refills: 0 | Status: SHIPPED | OUTPATIENT
Start: 2024-03-24 | End: 2024-03-31

## 2024-03-24 RX ORDER — IOPAMIDOL 755 MG/ML
90 INJECTION, SOLUTION INTRAVASCULAR ONCE
Status: COMPLETED | OUTPATIENT
Start: 2024-03-24 | End: 2024-03-24

## 2024-03-24 RX ORDER — ONDANSETRON 4 MG/1
4 TABLET, ORALLY DISINTEGRATING ORAL EVERY 8 HOURS PRN
Qty: 10 TABLET | Refills: 0 | Status: SHIPPED | OUTPATIENT
Start: 2024-03-24

## 2024-03-24 RX ORDER — HYDROCODONE BITARTRATE AND ACETAMINOPHEN 5; 325 MG/1; MG/1
1-2 TABLET ORAL EVERY 6 HOURS PRN
Qty: 15 TABLET | Refills: 0 | Status: SHIPPED | OUTPATIENT
Start: 2024-03-24

## 2024-03-24 RX ORDER — KETOROLAC TROMETHAMINE 10 MG/1
10 TABLET, FILM COATED ORAL EVERY 6 HOURS PRN
Qty: 20 TABLET | Refills: 0 | Status: SHIPPED | OUTPATIENT
Start: 2024-03-24

## 2024-03-24 RX ORDER — ONDANSETRON 4 MG/1
4 TABLET, ORALLY DISINTEGRATING ORAL ONCE
Status: COMPLETED | OUTPATIENT
Start: 2024-03-24 | End: 2024-03-24

## 2024-03-24 RX ORDER — TAMSULOSIN HYDROCHLORIDE 0.4 MG/1
0.4 CAPSULE ORAL ONCE
Status: COMPLETED | OUTPATIENT
Start: 2024-03-24 | End: 2024-03-24

## 2024-03-24 RX ADMIN — IOPAMIDOL 90 ML: 755 INJECTION, SOLUTION INTRAVENOUS at 21:09

## 2024-03-24 RX ADMIN — TAMSULOSIN HYDROCHLORIDE 0.4 MG: 0.4 CAPSULE ORAL at 22:22

## 2024-03-24 RX ADMIN — HYDROCODONE BITARTRATE AND ACETAMINOPHEN 1 TABLET: 5; 325 TABLET ORAL at 22:22

## 2024-03-24 RX ADMIN — ONDANSETRON 4 MG: 4 TABLET, ORALLY DISINTEGRATING ORAL at 22:22

## 2024-03-24 RX ADMIN — KETOROLAC TROMETHAMINE 15 MG: 15 INJECTION, SOLUTION INTRAMUSCULAR; INTRAVENOUS at 20:04

## 2024-03-24 ASSESSMENT — ACTIVITIES OF DAILY LIVING (ADL)
ADLS_ACUITY_SCORE: 35

## 2024-03-24 ASSESSMENT — COLUMBIA-SUICIDE SEVERITY RATING SCALE - C-SSRS
1. IN THE PAST MONTH, HAVE YOU WISHED YOU WERE DEAD OR WISHED YOU COULD GO TO SLEEP AND NOT WAKE UP?: NO
6. HAVE YOU EVER DONE ANYTHING, STARTED TO DO ANYTHING, OR PREPARED TO DO ANYTHING TO END YOUR LIFE?: NO
2. HAVE YOU ACTUALLY HAD ANY THOUGHTS OF KILLING YOURSELF IN THE PAST MONTH?: NO

## 2024-03-25 NOTE — ED NOTES
Pt came up to writer and was confused about where he was at, thinking he was at the urgent care and that he does not want a CT scan and wants to go home. Spoke to pt which pt had called Katelyn, ex-wife, who still is involved with pt's decision making, stated that pt had an old stroke and can get confused at times. After talking to Katelyn pt have agreed to going for CT scan.

## 2024-03-25 NOTE — DISCHARGE INSTRUCTIONS
Use Toradol as needed for pain, take tamsulosin daily to help kidney stone pass.  Use hydrocodone acetaminophen as needed for severe pain.  Take Zofran as needed for nausea/vomiting.  Stay hydrated, strain your urine, keep any stones or sediment from the strainer and bring to urology and/or primary care for analysis.

## 2024-03-25 NOTE — ED PROVIDER NOTES
EMERGENCY DEPARTMENT ENCOUNTER      NAME: Zhen Sherman  AGE: 63 year old male  YOB: 1960  MRN: [id number]  EVALUATION DATE & TIME: 3/24/2024  7:39 PM    PCP: Huy Bruce    ED PROVIDER: Deidre Kurtz M.D.      Chief Complaint   Patient presents with    Abdominal Pain       FINAL IMPRESSION:  1. Kidney stone on right side        ED COURSE & MEDICAL DECISION MAKING:    Left lower quadrant pain status post colonoscopy on Friday.  Differentials considered include pyelonephritis, ruptured/perforated bowel, nephrolithiasis, constipation, bowel obstruction, urinary tract infection, enteritis, colitis, diverticulitis.  IV, labs including CBC, CMP, lipase, lactic acid, urinalysis ordered.  IV Toradol, Zofran, fluids ordered.  CT abdomen pelvis ordered to rule out complications from colonoscopy along with evaluation for nephrolithiasis, colitis, enteritis etc.  These CT scan results reviewed by myself and show 4 mm obstructing kidney stone.  Urinalysis negative for UTI.  Patient states much proving after Toradol.  Is agreeable to trial of outpatient management.  Tamsulosin, hydrocodone, Zofran ordered prior to discharge.  Prescription sent to pharmacy for further nephrolithiasis treatment.  Recommended straining urine.  Outpatient urology referral placed.  Discharged home, return precautions given.    7:40 PM I met with the patient to gather history and to perform my initial exam. I discussed the plan for care while in the Emergency Department.     Pertinent Labs & Imaging studies reviewed. (See chart for details).    Medical Decision Making  Obtained supplemental history:Supplemental history obtained?: No  Reviewed external records: External records reviewed?: Outpatient Record: 3/22/24 colonoscopy procedure at [facility], results were normal  Care impacted by chronic illness:Other: antithrombin III deficiency  Care significantly affected by social determinants of health:N/A  Did you consider but not  order tests?: Work up considered but not performed and documented in chart, if applicable  Did you interpret images independently?: Independent interpretation of ECG and images noted in documentation, when applicable.  Consultation discussion with other provider:Did you involve another provider (consultant, , pharmacy, etc.)?: No  Discharge. I prescribed additional prescription strength medication(s) as charted. I considered admission, but discharged patient after significant clinical improvement.    At the conclusion of the encounter I discussed the results of all of the tests and the disposition. The questions were answered. The patient or family acknowledged understanding and was agreeable with the care plan.      CRITICAL CARE:  N/A    HPI    Patient information was obtained from: Patient     Use of : N/A        Zhen Sherman is a 63 year old male with pertinent history of stroke and antithrombin III deficiency, who presents with left-sided abdominal pain since this morning (3/24) that is relieved somewhat by laying on his back. Patient has also tried applying heat. He had a colonoscopy on Friday and felt fine on Saturday. Patient reports no bowel movement since his colonoscopy and tried laxatives and water 3-4 hours PTA with no relief. He notes that his blood was low at his normal annual visit in January and he took an iron pill today, which is abnormal for him.      REVIEW OF SYSTEMS  All other systems negative unless noted in HPI.    PAST MEDICAL HISTORY:  Past Medical History:   Diagnosis Date    Cerebral infarction (H)        PAST SURGICAL HISTORY:  Past Surgical History:   Procedure Laterality Date    IR CEREBRAL ANGIOGRAM  4/9/2018         CURRENT MEDICATIONS:    No current facility-administered medications for this encounter.     Current Outpatient Medications   Medication    HYDROcodone-acetaminophen (NORCO) 5-325 MG tablet    ketorolac (TORADOL) 10 MG tablet    ondansetron (ZOFRAN ODT) 4  MG ODT tab    tamsulosin (FLOMAX) 0.4 MG capsule    dorzolamide (TRUSOPT) 2 % ophthalmic solution    ferrous sulfate (FEROSUL) 325 (65 Fe) MG tablet    latanoprost (XALATAN) 0.005 % ophthalmic solution    PARoxetine (PAXIL) 20 MG tablet    traZODone (DESYREL) 50 MG tablet         ALLERGIES:  Allergies   Allergen Reactions    Pollen Extracts [Pollen Extract] Headache       FAMILY HISTORY:  Family History   Problem Relation Age of Onset    Glaucoma Mother     Macular Degeneration No family hx of        SOCIAL HISTORY:  Social History     Socioeconomic History    Marital status:    Tobacco Use    Smoking status: Never    Smokeless tobacco: Never   Vaping Use    Vaping Use: Never used     Social Determinants of Health     Financial Resource Strain: Low Risk  (1/25/2024)    Financial Resource Strain     Within the past 12 months, have you or your family members you live with been unable to get utilities (heat, electricity) when it was really needed?: No   Food Insecurity: Low Risk  (1/25/2024)    Food Insecurity     Within the past 12 months, did you worry that your food would run out before you got money to buy more?: No     Within the past 12 months, did the food you bought just not last and you didn t have money to get more?: No   Transportation Needs: Low Risk  (1/25/2024)    Transportation Needs     Within the past 12 months, has lack of transportation kept you from medical appointments, getting your medicines, non-medical meetings or appointments, work, or from getting things that you need?: No   Interpersonal Safety: Low Risk  (1/31/2024)    Interpersonal Safety     Do you feel physically and emotionally safe where you currently live?: Yes     Within the past 12 months, have you been hit, slapped, kicked or otherwise physically hurt by someone?: No     Within the past 12 months, have you been humiliated or emotionally abused in other ways by your partner or ex-partner?: No   Housing Stability: Low Risk   (1/25/2024)    Housing Stability     Do you have housing? : Yes     Are you worried about losing your housing?: No       VITALS:  Patient Vitals for the past 24 hrs:   BP Temp Pulse Resp SpO2 Weight   03/24/24 2215 (!) 154/92 -- 74 -- 100 % --   03/24/24 2158 (!) 163/85 -- 75 -- 100 % --   03/24/24 2143 (!) 163/91 -- 75 -- 99 % --   03/24/24 2128 (!) 157/88 -- 76 -- 100 % --   03/24/24 2117 (!) 165/86 -- 77 -- 98 % --   03/24/24 2002 (!) 166/87 -- 71 -- 99 % --   03/24/24 1945 (!) 173/82 -- 71 -- 100 % --   03/24/24 1931 -- -- -- -- 98 % --   03/24/24 1930 -- -- -- -- 95 % --   03/24/24 1927 (!) 205/93 -- 97 -- -- --   03/24/24 1926 -- 98.3  F (36.8  C) -- 20 -- 75.8 kg (167 lb)       PHYSICAL EXAM    VITAL SIGNS: BP (!) 154/92   Pulse 74   Temp 98.3  F (36.8  C)   Resp 20   Wt 75.8 kg (167 lb)   SpO2 100%   BMI 22.18 kg/m    Physical Exam  Vitals and nursing note reviewed.   Constitutional:       General: He is in acute distress (Mild).      Appearance: He is not toxic-appearing.   Eyes:      General: No scleral icterus.        Right eye: No discharge.         Left eye: No discharge.   Cardiovascular:      Rate and Rhythm: Normal rate and regular rhythm.   Pulmonary:      Effort: Pulmonary effort is normal. No respiratory distress.      Breath sounds: Normal breath sounds.   Abdominal:      General: There is no distension.      Palpations: Abdomen is soft.      Tenderness: There is abdominal tenderness (Left periumbilical, left lower quadrant tender). There is no guarding or rebound.   Musculoskeletal:         General: No swelling or deformity.      Cervical back: Neck supple. No rigidity.   Skin:     General: Skin is warm and dry.      Capillary Refill: Capillary refill takes less than 2 seconds.      Findings: No bruising or erythema.   Neurological:      General: No focal deficit present.      Mental Status: He is alert and oriented to person, place, and time. Mental status is at baseline.      Comments:  No slurred speech, following commands spontaneously. No facial droop.   Psychiatric:         Mood and Affect: Mood normal.         Behavior: Behavior normal.         LABS  Labs Ordered and Resulted from Time of ED Arrival to Time of ED Departure   COMPREHENSIVE METABOLIC PANEL - Abnormal       Result Value    Sodium 141      Potassium 4.3      Carbon Dioxide (CO2) 25      Anion Gap 12      Urea Nitrogen 14.6      Creatinine 1.13      GFR Estimate 74      Calcium 8.9      Chloride 104      Glucose 108 (*)     Alkaline Phosphatase 160 (*)     AST 16      ALT 9      Protein Total 7.1      Albumin 3.7      Bilirubin Total <0.2     ROUTINE UA WITH MICROSCOPIC REFLEX TO CULTURE - Abnormal    Color Urine Light Yellow      Appearance Urine Clear      Glucose Urine Negative      Bilirubin Urine Negative      Ketones Urine Negative      Specific Gravity Urine 1.027      Blood Urine 0.03 mg/dL (*)     pH Urine 6.5      Protein Albumin Urine Negative      Urobilinogen Urine <2.0      Nitrite Urine Negative      Leukocyte Esterase Urine Negative      RBC Urine 23 (*)     WBC Urine 1     CBC WITH PLATELETS AND DIFFERENTIAL - Abnormal    WBC Count 8.9      RBC Count 4.02 (*)     Hemoglobin 10.0 (*)     Hematocrit 32.9 (*)     MCV 82      MCH 24.9 (*)     MCHC 30.4 (*)     RDW 14.8      Platelet Count 368      % Neutrophils 59      % Lymphocytes 19      % Monocytes 16      % Eosinophils 5      % Basophils 1      % Immature Granulocytes 0      NRBCs per 100 WBC 0      Absolute Neutrophils 5.3      Absolute Lymphocytes 1.7      Absolute Monocytes 1.4 (*)     Absolute Eosinophils 0.4      Absolute Basophils 0.1      Absolute Immature Granulocytes 0.0      Absolute NRBCs 0.0     LIPASE - Normal    Lipase 40     LACTIC ACID WHOLE BLOOD - Normal    Lactic Acid 0.8           RADIOLOGY  CT Abdomen Pelvis w Contrast   Final Result   IMPRESSION:    1.  Obstructing 4 mm stone in the distal left ureter with mild-to-moderate proximal  hydroureteronephrosis.   2.  Additional punctate nonobstructing left renal calculi.         I have independently reviewed the above image. See radiology report for detail.      EKG:    NA       PROCEDURES:  N/A      MEDICATIONS GIVEN IN THE EMERGENCY:  Medications   ketorolac (TORADOL) injection 15 mg (15 mg Intravenous $Given 3/24/24 2004)   iopamidol (ISOVUE-370) solution 90 mL (90 mLs Intravenous $Given 3/24/24 2109)   tamsulosin (FLOMAX) capsule 0.4 mg (0.4 mg Oral $Given 3/24/24 2222)   HYDROcodone-acetaminophen (NORCO) 5-325 MG per tablet 1 tablet (1 tablet Oral $Given 3/24/24 2222)   ondansetron (ZOFRAN ODT) ODT tab 4 mg (4 mg Oral $Given 3/24/24 2222)       NEW PRESCRIPTIONS STARTED AT TODAY'S ER VISIT  Discharge Medication List as of 3/24/2024 10:35 PM        START taking these medications    Details   HYDROcodone-acetaminophen (NORCO) 5-325 MG tablet Take 1-2 tablets by mouth every 6 hours as needed for moderate to severe pain (Do not exceed 6 tablets in 1 day), Disp-15 tablet, R-0, E-Prescribe      ketorolac (TORADOL) 10 MG tablet Take 1 tablet (10 mg) by mouth every 6 hours as needed (Mild to moderate pain), Disp-20 tablet, R-0, E-Prescribe      ondansetron (ZOFRAN ODT) 4 MG ODT tab Take 1 tablet (4 mg) by mouth every 8 hours as needed for nausea, Disp-10 tablet, R-0, E-Prescribe      tamsulosin (FLOMAX) 0.4 MG capsule Take 1 capsule (0.4 mg) by mouth daily for 7 days, Disp-7 capsule, R-0, E-Prescribe              I, Emelia Cruz, am serving as a scribe to document services personally performed by Deidre Kurtz MD, based on my observations and the provider's statements to me.  I, Deidre Kurtz MD, attest that Emelia Cruz is acting in a scribe capacity, has observed my performance of the services and has documented them in accordance with my direction.     Deidre Kurtz MD  Emergency Medicine  [facility] EMERGENCY DEPARTMENT  [address] [place] [address]  [id number]  [phone]  Dept: [phone]            Deidre Kurtz MD  03/25/24 0011       Deidre Kurtz MD  03/25/24 0012

## 2024-03-25 NOTE — ED TRIAGE NOTES
Patient reports right sided abdominal pain since colonoscopy on Friday states he feels constipated so he took two dulcolax. Patient denies NVD.

## 2024-04-16 NOTE — PROGRESS NOTES
Urology Video Office Visit    Video-Visit Details    Type of service:  Video Visit    Video Start Time: 0757    Video End Time: 0812    Originating Location (pt. Location): Home    Distant Location (provider location):  On-site     Platform used for Video Visit: ZEFR           Assessment and Plan:     Assessment:63 year old male with a left 4mm distal ureteral stone.     Plan:  -Reviewed CT scan with patient.   -The patient and I discussed the diagnosis and natural history of urolithiasis. Discussed future visit to educate on kidney stone prevention.   -Discussed that stone may have passed into urinary bladder given resolution of symptoms however stone may still be present in ureter. Would like patient either to observe and retrieve the stone or return to clinic in 3-4 weeks with updated CT scan to ensure stone passage.   -Recommend to continue to stay well-hydrated and strain urine as able.   -If having severe flank pain, fevers, chills, nausea, or vomiting please notify Urology clinic or be seen in the ER.   -RTC in 3-4 weeks with CT scan.    Carlie Gonsales CNP  Department of Urology  April 16, 2024    I spent a total of 30 minutes spent on the date of the encounter doing chart review, history and exam, documentation, and further activities as noted above.          Chief Complaint:   Left Ureteral Stone         History of Present Illness:    Zhen Sherman is a pleasant 63 year old male who presents with concerns of a left ureteral stone. PMHx: CVA, Antithrombin III deficiency, and bicuspid aortic valve.     Mr. Sherman was seen in the ER on 3/24/24 for concerns of left abdominal pain after a colonoscopy two days prior. He notes initially concerns of constipation.     CT scan performed on 3/24/24 (images personally reviewed) revealed a left 4mm distal ureteral stone with hydronephrosis. No noted left renal stones. No noted right hydronephrosis or nephrolithiasis.     He states he is doing great today.  Hasn't had any pain for the last week. Denies any f/c/n/v, gross hematuria, or dysuria. He has been straining his urine and has not seen the stone pass at this time.     This is his first stone episode.     No known family history of nephrolithiasis.          Past Medical History:     Past Medical History:   Diagnosis Date    Cerebral infarction (H)             Past Surgical History:     Past Surgical History:   Procedure Laterality Date    COLONOSCOPY N/A 3/22/2024    Procedure: COLONOSCOPY;  Surgeon: Gia Worley MD;  Location: [facility] OR     CEREBRAL ANGIOGRAM  4/9/2018            Medications     Current Outpatient Medications   Medication Sig Dispense Refill    dorzolamide (TRUSOPT) 2 % ophthalmic solution Place 1 drop into both eyes every morning 5 mL 11    ferrous sulfate (FEROSUL) 325 (65 Fe) MG tablet Take 1 tablet (325 mg) by mouth daily (with breakfast) 90 tablet 0    HYDROcodone-acetaminophen (NORCO) 5-325 MG tablet Take 1-2 tablets by mouth every 6 hours as needed for moderate to severe pain (Do not exceed 6 tablets in 1 day) 15 tablet 0    ketorolac (TORADOL) 10 MG tablet Take 1 tablet (10 mg) by mouth every 6 hours as needed (Mild to moderate pain) 20 tablet 0    latanoprost (XALATAN) 0.005 % ophthalmic solution Place 1 drop into both eyes At Bedtime 2.5 mL 11    ondansetron (ZOFRAN ODT) 4 MG ODT tab Take 1 tablet (4 mg) by mouth every 8 hours as needed for nausea 10 tablet 0    PARoxetine (PAXIL) 20 MG tablet Take 1 tablet (20 mg) by mouth every morning 90 tablet 3    traZODone (DESYREL) 50 MG tablet TAKE 1 AND 1/2 TABLETS BY MOUTH EVERY NIGHT AT BEDTIME 135 tablet 3     No current facility-administered medications for this visit.            Family History:     Family History   Problem Relation Age of Onset    Glaucoma Mother     Macular Degeneration No family hx of             Social History:     Social History     Socioeconomic History    Marital status:      Spouse name: Not  on file    Number of children: Not on file    Years of education: Not on file    Highest education level: Not on file   Occupational History    Not on file   Tobacco Use    Smoking status: Never    Smokeless tobacco: Never   Vaping Use    Vaping status: Never Used   Substance and Sexual Activity    Alcohol use: Not on file    Drug use: Not on file    Sexual activity: Not on file   Other Topics Concern    Not on file   Social History Narrative    Not on file     Social Determinants of Health     Financial Resource Strain: Low Risk  (1/25/2024)    Financial Resource Strain     Within the past 12 months, have you or your family members you live with been unable to get utilities (heat, electricity) when it was really needed?: No   Food Insecurity: Low Risk  (1/25/2024)    Food Insecurity     Within the past 12 months, did you worry that your food would run out before you got money to buy more?: No     Within the past 12 months, did the food you bought just not last and you didn t have money to get more?: No   Transportation Needs: Low Risk  (1/25/2024)    Transportation Needs     Within the past 12 months, has lack of transportation kept you from medical appointments, getting your medicines, non-medical meetings or appointments, work, or from getting things that you need?: No   Physical Activity: Not on file   Stress: Not on file   Social Connections: Not on file   Interpersonal Safety: Low Risk  (1/31/2024)    Interpersonal Safety     Do you feel physically and emotionally safe where you currently live?: Yes     Within the past 12 months, have you been hit, slapped, kicked or otherwise physically hurt by someone?: No     Within the past 12 months, have you been humiliated or emotionally abused in other ways by your partner or ex-partner?: No   Housing Stability: Low Risk  (1/25/2024)    Housing Stability     Do you have housing? : Yes     Are you worried about losing your housing?: No            Allergies:   Pollen  extracts [pollen extract]         Review of Systems:  From intake questionnaire   Negative 14 system review except as noted on HPI, nurse's note.         Physical Exam:   General Appearance: Well groomed, hygenic  Eyes: No redness, discharge  Respiratory: No cough, no respiratory distress or labored breathing  Musculoskeletal: Grossly normal, full range of motion in upper extremities, no gross deficits  Skin: No discoloration or apparent rashes  Neurologic - No tremors  Psychiatric - Alert and oriented  The rest of a comprehensive physical examination is deferred due to video visit restrictions        Labs:    I personally reviewed all applicable laboratory data and went over findings with patient  Significant for:    CBC RESULTS:  Recent Labs   Lab Test 03/24/24 2001 02/01/24 0926 12/09/22 0947 09/14/21  1042   WBC 8.9 6.6 6.6 7.7   HGB 10.0* 10.3* 9.5* 10.5*    372 305 325        BMP RESULTS:  Recent Labs   Lab Test 03/24/24 2001 02/01/24 0926 12/09/22 0947 09/14/21  1042 05/29/18  1652 04/10/18  0817 04/10/18  0536 04/07/18  1913 04/07/18  1348    141 142 142 144   < > 141  141   < > 140   POTASSIUM 4.3 4.2 3.9 4.4 3.4*  --  3.7  --  3.6   CHLORIDE 104 105 104 106 109*  --  109*   < > 102   CO2 25 25 24 25 24  --  24   < > 27   ANIONGAP 12 11 14 11 11  --  8  --  11   * 85 86 87 91   < > 94   < > 97   BUN 14.6 9.9 12.3 12 16  --  6*  --  7*   CR 1.13 0.80 0.82 0.85 0.90  --  0.67*  --  0.80   GFRESTIMATED 74 >90 >90 >90 >60  --  >60  --  >60   GFRESTBLACK  --   --   --   --  >60  --  >60  --  >60   ORTEGA 8.9 9.3 8.7* 9.1 9.3  --  8.7  --  9.9    < > = values in this interval not displayed.       UA RESULTS:   Recent Labs   Lab Test 03/24/24 2042 09/02/21  1406   SG 1.027 1.025   URINEPH 6.5 5.5   NITRITE Negative Negative   RBCU 23* >100*   WBCU 1 0-5       CALCIUM RESULTS  Lab Results   Component Value Date    ORTEGA 8.9 03/24/2024    ORTEGA 9.3 02/01/2024    ORTEGA 8.7 12/09/2022            Imaging:    I personally reviewed all applicable imaging and went over the below findings with patient.    Results for orders placed or performed during the hospital encounter of 03/24/24   CT Abdomen Pelvis w Contrast    Narrative    EXAM: CT ABDOMEN PELVIS W CONTRAST  LOCATION: [facility]  DATE: 3/24/2024    INDICATION: Abdominal pain  COMPARISON: CT 09/20/2021  TECHNIQUE: CT scan of the abdomen and pelvis was performed following injection of IV contrast. Multiplanar reformats were obtained. Dose reduction techniques were used.  CONTRAST: isovue 370 90ml    FINDINGS:   LOWER CHEST: Unremarkable.    HEPATOBILIARY: Likely small hepatic cysts, no specific follow-up recommended. No evidence of biliary obstruction.    PANCREAS: Normal.    SPLEEN: Small calcified splenic granulomas.    ADRENAL GLANDS: Normal.    KIDNEYS/BLADDER: Obstructing 4 mm stone in the distal left ureter just proximal to the ureterovesicular junction (series 3 image 192) with mild to moderate proximal hydroureteronephrosis and perinephric stranding. Delayed nephrogram also noted. Punctate   nonobstructing left renal calculi. No right hydronephrosis. Urinary bladder is decompressed but otherwise unremarkable.    BOWEL: No obstruction or inflammatory change. Moderate volume formed stool in the colon. Normal appendix.    LYMPH NODES: No suspicious lymphadenopathy.    VASCULATURE: Scattered calcified atherosclerosis.    PELVIC ORGANS: Normal.    MUSCULOSKELETAL: No acute bony abnormality.      Impression    IMPRESSION:   1.  Obstructing 4 mm stone in the distal left ureter with mild-to-moderate proximal hydroureteronephrosis.  2.  Additional punctate nonobstructing left renal calculi.

## 2024-04-17 ENCOUNTER — VIRTUAL VISIT (OUTPATIENT)
Dept: UROLOGY | Facility: CLINIC | Age: 64
End: 2024-04-17
Attending: EMERGENCY MEDICINE
Payer: MEDICARE

## 2024-04-17 DIAGNOSIS — N20.1 CALCULUS OF DISTAL LEFT URETER: Primary | ICD-10-CM

## 2024-04-17 PROCEDURE — 99214 OFFICE O/P EST MOD 30 MIN: CPT | Mod: 95 | Performed by: NURSE PRACTITIONER

## 2024-04-17 ASSESSMENT — PAIN SCALES - GENERAL: PAINLEVEL: NO PAIN (0)

## 2024-04-17 NOTE — NURSING NOTE
Is the patient currently in the state of [place]? YES    Visit mode:VIDEO    If the visit is dropped, the patient can be reconnected by: VIDEO VISIT: Text to cell phone:   Telephone Information:   Mobile [phone]   Mobile [phone]       Will anyone else be joining the visit? NO  (If patient encounters technical issues they should call [phone])    How would you like to obtain your AVS? MyChart    Are changes needed to the allergy or medication list? No    Are refills needed on medications prescribed by this physician? YES    Reason for visit: Consult (New kidney stone )    Rahel WHEELER

## 2024-04-17 NOTE — PATIENT INSTRUCTIONS
UROLOGY CLINIC VISIT PATIENT INSTRUCTIONS    -If having severe flank pain, fevers >101.0F, chills, nausea, or vomiting please notify Urology clinic or be seen in the ER.   -Recommend to continue to stay well-hydrated. Please continue to strain your urine. If able to retrieve your stone or if you see your stone pass, please notify Urology clinic.   -Return to clinic in 3-4 weeks with repeat CT scan to ensure stone passage.     If you have any issues, questions or concerns in the meantime, do not hesitate to contact us at [facility] at [phone] or via [employer].     Carlie Gonsales CNP  Department of Urology     
Patient has no objection to blood transfusions.

## 2024-04-19 DIAGNOSIS — H40.1131 PRIMARY OPEN ANGLE GLAUCOMA (POAG) OF BOTH EYES, MILD STAGE: ICD-10-CM

## 2024-04-19 RX ORDER — DORZOLAMIDE HCL 20 MG/ML
1 SOLUTION/ DROPS OPHTHALMIC EVERY MORNING
Qty: 5 ML | Refills: 11 | Status: SHIPPED | OUTPATIENT
Start: 2024-04-19

## 2024-04-19 NOTE — CONFIDENTIAL NOTE
Received refill request to refill the prescription for Dorzolamide from [facility]. The patient's most recent visit with Dr. Kumar 11/21/2023, who advised them to continue taking the medication. I will proceed with sending the refill.

## 2024-05-06 ENCOUNTER — HOSPITAL ENCOUNTER (OUTPATIENT)
Dept: CT IMAGING | Facility: HOSPITAL | Age: 64
Discharge: HOME | End: 2024-05-06
Attending: NURSE PRACTITIONER | Admitting: NURSE PRACTITIONER
Payer: MEDICARE

## 2024-05-06 DIAGNOSIS — N20.1 CALCULUS OF DISTAL LEFT URETER: ICD-10-CM

## 2024-05-06 PROCEDURE — 74176 CT ABD & PELVIS W/O CONTRAST: CPT | Mod: MG

## 2024-05-07 ENCOUNTER — TELEPHONE (OUTPATIENT)
Dept: UROLOGY | Facility: CLINIC | Age: 64
End: 2024-05-07

## 2024-05-07 ENCOUNTER — VIRTUAL VISIT (OUTPATIENT)
Dept: UROLOGY | Facility: CLINIC | Age: 64
End: 2024-05-07
Payer: MEDICARE

## 2024-05-07 VITALS — HEIGHT: 74 IN | WEIGHT: 165 LBS | BODY MASS INDEX: 21.17 KG/M2

## 2024-05-07 DIAGNOSIS — N20.0 NEPHROLITHIASIS: Primary | ICD-10-CM

## 2024-05-07 PROCEDURE — 99215 OFFICE O/P EST HI 40 MIN: CPT | Mod: 95 | Performed by: NURSE PRACTITIONER

## 2024-05-07 ASSESSMENT — PAIN SCALES - GENERAL: PAINLEVEL: NO PAIN (0)

## 2024-05-07 NOTE — PROGRESS NOTES
Urology Video Office Visit    Video-Visit Details    Type of service:  Video Visit    Video Start Time: 1128    Video End Time: 1154    Originating Location (pt. Location): Home    Distant Location (provider location):  Off-site     Platform used for Video Visit: Reactful           Assessment and Plan:     Assessment: 63 year old male with history of nephrolithiasis     Plan:  -Reviewed CT scan revealed interval passage of his left ureteral stone.   -The patient and I discussed the diagnosis and natural history of urolithiasis. Discussed option of 24 hour urine study for further evaluation for risks of stones. Pt amenable to plan of care. Will send litholink kit x 1  -We discussed some general measures to prevent recurrent kidney stones.  These include fluid intake to achieve 2.5 liters of urine per day, decreased salt intake, normal calcium intake, lowering animal protein intake, avoiding high amounts of oxalate containing foods, and increased citrate intake with orange juice/lemonade.  -RTC in 6-8 weeks to review 24 hour urine study.    Carlie Gonsales CNP  Department of Urology  May 7, 2024    I spent a total of 40 minutes spent on the date of the encounter doing chart review, history and exam, documentation, and further activities as noted above.          Chief Complaint:   Left Ureteral Stone         History of Present Illness:    Zhen Sherman is a pleasant 63 year old male who presents with his ex-spouse, Shanique, for follow up of a left ureteral stone. PMHx: CVA, Antithrombin III deficiency, and bicuspid aortic valve.     Mr. Sherman was last seen with the Urology clinic on 4/17/24 for concerns of a left ureteral stone.     CT scan on 5/6/24 (images personally reviewed) revealed interval passage of left ureteral stone. No noted nephrolithiasis or hydronephrosis.     He is doing well today. Denies any flank pain, f/c/n/v, gross hematuria, or dysuria.      He was initially seen in the ER on 3/24/24 for  concerns of left abdominal pain after a colonoscopy two days prior. He notes initially concerns of constipation. CT scan performed on 3/24/24 (images personally reviewed) revealed a left 4mm distal ureteral stone with hydronephrosis. No noted left renal stones. No noted right hydronephrosis or nephrolithiasis.       This is his first stone episode.      No known family history of nephrolithiasis.            Past Medical History:     Past Medical History:   Diagnosis Date    Cerebral infarction (H)             Past Surgical History:     Past Surgical History:   Procedure Laterality Date    COLONOSCOPY N/A 3/22/2024    Procedure: COLONOSCOPY;  Surgeon: Gia Worley MD;  Location: [facility] OR     CEREBRAL ANGIOGRAM  4/9/2018            Medications     Current Outpatient Medications   Medication Sig Dispense Refill    dorzolamide (TRUSOPT) 2 % ophthalmic solution Place 1 drop into both eyes every morning 5 mL 11    ferrous sulfate (FEROSUL) 325 (65 Fe) MG tablet Take 1 tablet (325 mg) by mouth daily (with breakfast) 90 tablet 0    HYDROcodone-acetaminophen (NORCO) 5-325 MG tablet Take 1-2 tablets by mouth every 6 hours as needed for moderate to severe pain (Do not exceed 6 tablets in 1 day) 15 tablet 0    ketorolac (TORADOL) 10 MG tablet Take 1 tablet (10 mg) by mouth every 6 hours as needed (Mild to moderate pain) 20 tablet 0    latanoprost (XALATAN) 0.005 % ophthalmic solution Place 1 drop into both eyes At Bedtime 2.5 mL 11    ondansetron (ZOFRAN ODT) 4 MG ODT tab Take 1 tablet (4 mg) by mouth every 8 hours as needed for nausea 10 tablet 0    PARoxetine (PAXIL) 20 MG tablet Take 1 tablet (20 mg) by mouth every morning 90 tablet 3    traZODone (DESYREL) 50 MG tablet TAKE 1 AND 1/2 TABLETS BY MOUTH EVERY NIGHT AT BEDTIME 135 tablet 3     No current facility-administered medications for this visit.            Family History:     Family History   Problem Relation Age of Onset    Glaucoma Mother     Macular  Degeneration No family hx of             Social History:     Social History     Socioeconomic History    Marital status:      Spouse name: Not on file    Number of children: Not on file    Years of education: Not on file    Highest education level: Not on file   Occupational History    Not on file   Tobacco Use    Smoking status: Never    Smokeless tobacco: Never   Vaping Use    Vaping status: Never Used   Substance and Sexual Activity    Alcohol use: Not on file    Drug use: Not on file    Sexual activity: Not on file   Other Topics Concern    Not on file   Social History Narrative    Not on file     Social Determinants of Health     Financial Resource Strain: Low Risk  (1/25/2024)    Financial Resource Strain     Within the past 12 months, have you or your family members you live with been unable to get utilities (heat, electricity) when it was really needed?: No   Food Insecurity: Low Risk  (1/25/2024)    Food Insecurity     Within the past 12 months, did you worry that your food would run out before you got money to buy more?: No     Within the past 12 months, did the food you bought just not last and you didn t have money to get more?: No   Transportation Needs: Low Risk  (1/25/2024)    Transportation Needs     Within the past 12 months, has lack of transportation kept you from medical appointments, getting your medicines, non-medical meetings or appointments, work, or from getting things that you need?: No   Physical Activity: Not on file   Stress: Not on file   Social Connections: Not on file   Interpersonal Safety: Low Risk  (1/31/2024)    Interpersonal Safety     Do you feel physically and emotionally safe where you currently live?: Yes     Within the past 12 months, have you been hit, slapped, kicked or otherwise physically hurt by someone?: No     Within the past 12 months, have you been humiliated or emotionally abused in other ways by your partner or ex-partner?: No   Housing Stability: Low Risk   (1/25/2024)    Housing Stability     Do you have housing? : Yes     Are you worried about losing your housing?: No            Allergies:   Pollen extracts [pollen extract]         Review of Systems:  From intake questionnaire   Negative 14 system review except as noted on HPI, nurse's note.         Physical Exam:   General Appearance: Well groomed, hygenic  Eyes: No redness, discharge  Respiratory: No cough, no respiratory distress or labored breathing  Musculoskeletal: Grossly normal, full range of motion in upper extremities, no gross deficits  Skin: No discoloration or apparent rashes  Neurologic - No tremors  Psychiatric - Alert and oriented  The rest of a comprehensive physical examination is deferred due to video visit restrictions        Labs:    I personally reviewed all applicable laboratory data and went over findings with patient  Significant for:    CBC RESULTS:  Recent Labs   Lab Test 03/24/24 2001 02/01/24 0926 12/09/22  0947 09/14/21  1042   WBC 8.9 6.6 6.6 7.7   HGB 10.0* 10.3* 9.5* 10.5*    372 305 325        BMP RESULTS:  Recent Labs   Lab Test 03/24/24 2001 02/01/24 0926 12/09/22  0947 09/14/21  1042 05/29/18  1652 04/10/18  0817 04/10/18  0536 04/07/18  1913 04/07/18  1348    141 142 142 144   < > 141  141   < > 140   POTASSIUM 4.3 4.2 3.9 4.4 3.4*  --  3.7  --  3.6   CHLORIDE 104 105 104 106 109*  --  109*   < > 102   CO2 25 25 24 25 24  --  24   < > 27   ANIONGAP 12 11 14 11 11  --  8  --  11   * 85 86 87 91   < > 94   < > 97   BUN 14.6 9.9 12.3 12 16  --  6*  --  7*   CR 1.13 0.80 0.82 0.85 0.90  --  0.67*  --  0.80   GFRESTIMATED 74 >90 >90 >90 >60  --  >60  --  >60   GFRESTBLACK  --   --   --   --  >60  --  >60  --  >60   ORTEGA 8.9 9.3 8.7* 9.1 9.3  --  8.7  --  9.9    < > = values in this interval not displayed.       UA RESULTS:   Recent Labs   Lab Test 03/24/24 2042 09/02/21  1406   SG 1.027 1.025   URINEPH 6.5 5.5   NITRITE Negative Negative   RBCU 23* >100*    WBCU 1 0-5       CALCIUM RESULTS  Lab Results   Component Value Date    ORTEGA 8.9 03/24/2024    ORTEGA 9.3 02/01/2024    ORTEGA 8.7 12/09/2022           Imaging:    I personally reviewed all applicable imaging and went over the below findings with patient.    Results for orders placed or performed during the hospital encounter of 05/06/24   CT Abdomen Pelvis w/o Contrast    Narrative    EXAM: CT ABDOMEN PELVIS W/O CONTRAST  LOCATION: [facility]  DATE: 5/6/2024    INDICATION: Left distal ureteral stone.  COMPARISON: CT abdomen and pelvis on 3/24/2024.  TECHNIQUE: CT scan of the abdomen and pelvis was performed without intravenous contrast Multiplanar reformats were obtained. Dose reduction techniques were used.    FINDINGS: Exam is limited by motion/respiratory artifact, within this limitation, there is:    LOWER CHEST: Basilar pulmonary opacities, likely atelectasis.    ABDOMEN/PELVIS: Limited evaluation of the abdominal organs due to lack of intravenous contrast.    HEPATOBILIARY: Scattered subcentimeter hyperdense foci in the liver, too small to characterize.    PANCREAS: The unenhanced pancreas is grossly unremarkable.    SPLEEN: No splenomegaly. Multiple calcified splenic granulomas.    ADRENAL GLANDS: 1.9 cm right adrenal nodule (series 5 image 32) and 1.3 cm left adrenal nodule (series 5 image 45), not significantly changed as compared to 9/20/2021 exam and has likely benign nodule likely adrenal adenomas.    KIDNEYS/BLADDER: Punctate stone at the upper pole of the left kidney (series 5 image 49). No significant right kidney stone. No ureteral stone or hydronephrosis in either kidney. The previously seen distal left ureteral stone on 3/24/2024 exam is not   seen on today's exam    BOWEL: No abnormally dilated bowel loops. Moderate amount of stool throughout the colon, nonspecific, can be seen with constipation. The appendix is visualized and appears normal.    PERITONEUM: No evidence of free  fluid in the abdomen and pelvis. No free peritoneal or portal venous gas.    PELVIC ORGANS: Unremarkable.    VASCULATURE: Moderate atherosclerotic vascular calcification of the abdominal aorta and iliac vessels.    LYMPH NODES: Unremarkable.    MUSCULOSKELETAL: Multilevel degenerative changes of the spine. No suspicious osseous lesion.      Impression    IMPRESSION: The exam is limited by motion/respiratory artifact, within this limitation, there is:  1. Punctate left kidney stone. No right kidney stone. No ureteral stone or hydronephrosis in either kidney. The previously seen distal left ureteral stone on 3/24/2024 exam is not seen on today's exam.  2. Bilateral adrenal nodules, not significantly changed as compared to 9/20/2021 exam, although technically indeterminate, likely benign nodule likely adrenal adenomas given their interval stability  3. Moderate amount stool throughout the colon, nonspecific, can be seen with constipation.

## 2024-05-07 NOTE — NURSING NOTE
Is the patient currently in the state of [place]? YES    Visit mode:VIDEO    If the visit is dropped, the patient can be reconnected by: VIDEO VISIT: Send to e-mail at: [email or web address]    Will anyone else be joining the visit? YES: How would they like to receive their invitation? Send to e-mail: [email or web address]  (If patient encounters technical issues they should call [phone])    How would you like to obtain your AVS? MyChart    Are changes needed to the allergy or medication list? No    Are refills needed on medications prescribed by this physician? NO    Reason for visit: MENDOZA WHEELER

## 2024-05-07 NOTE — PATIENT INSTRUCTIONS
"UROLOGY CLINIC VISIT PATIENT INSTRUCTIONS    If you have any issues, questions or concerns in the meantime, do not hesitate to contact us at [facility] at [phone] or via [employer].     Carlie Gonsales CNP  Department of Urology      DIET & LIFESTYLE CHANGES FOR PATIENTS WITH KIDNEY STONES    If you've had a kidney stone, you are likely to form another. Risk of recurrence is 15% at 1 year, 35% to 40% at 2 years, and 50% at 10 years. Therefore, prevention is very important.  These recommendations have shown to be effective.    CALCIUM STONES (Oxalate and Phosphate)    Fluid intake is the most important prevention measure to help prevent stones. Fluid intake should be at least 2.5 liters per day or 90-120oz per day. With goal of urine output of >2.5L per day.   Increasing liquids that have citric acid may help such as low calorie orange juice, lemonade (Crystal Light Lemonade or True Lemon/Lime), or adding a citrus to your water.  You can add lemon juice or fresh radha to your water daily.  Lemon juice increases the citrate in your urine, and helps decrease the formation of stone and even breakdown certain types of stones. Add a cap full/teaspoon of pure lemon juice to each glass.   Try to limit sugar, especially if you have diabetes.    Helpful Fluid Measurements:  1 liter = 34oz  1 quart = 32 oz  24 pack water: Each bottle 16.9 oz     Low Oxalate Diet: Limit your consumption of OXALATE-rich foods including:  All nuts and nut products including peanuts, almonds,peanut butter, almond milk  Spinach  Rhubarb  Beets  Chocolate  Soybeans and soy products   Wheat Germ    Website:   [email or web address].[email or web address]  Below is a link to a PDF that is based on [employer] research. Please stick to pages 6-9 of this document. My suggestion is to review the list of food that is OK. The \"avoid\" list can be " overwhelming.  [email or web address]_Food_Lists_KSD.pdf?mc_cid=[email or web address]&mc_eid=37qt11267f        Low Sodium Diet: Salt (sodium chloride) is found in abundance in many foods. High sodium levels in the urine can interfere with the kidney's handling of calcium.   Trying a DASH (Dietary Approaches to Stop Hypertension) diet which is eating more fruits and vegetables, limiting salt intake, moderate in low-fat diary products, and low in animal protein.   Try to decrease salt intake to <2000 mg of sodium daily.     Tips for reducing the salt in your diet:  Don't use salt at the table  Reduce the salt used in food preparation. Try 1/2 teaspoon when recipes call for 1 teaspoon.  Use herbs and spices for flavoring instead of salt.  Avoid salty foods.  Check the label before you buy or use a product. Note sodium and portion size information.  Try to consume less than 2,000 mg/day. (1 teaspoon = 2,000 mg)    Foods with high sodium content include:  Processed meat (including luncheon meats, sausage)   Crackers   Instant cereal   Processed cheese   Canned soups   Chips and snack foods   Soy sauce    Low Animal Protein: Reduce animal protein (meat) intake to no more than10 ounces or less than 50 grams per day.     Maintain a normal calcium diet: Calcium rich foods are encouraged, but no more than 1000 - 1200 mg per day. Researches have found that people with low calcium intakes tend to have more stones. Foods with high calcium content are acceptable and include:  Calcium rich foods include:   Diary (cheese, milk, and yogurt)  Enriched cereals  Meat and fish  Enriched cereals  Dark green vegetables    Limit Vitamin C intake to < 1000 mg daily.    Consultation with a dietician may be helpful as well.  Please let our staff know if you are interested in this helpful option so a consult may be placed for you.

## 2024-06-07 ENCOUNTER — TELEPHONE (OUTPATIENT)
Dept: UROLOGY | Facility: CLINIC | Age: 64
End: 2024-06-07
Payer: MEDICARE

## 2024-06-14 ENCOUNTER — TELEPHONE (OUTPATIENT)
Dept: FAMILY MEDICINE | Facility: CLINIC | Age: 64
End: 2024-06-14
Payer: MEDICARE

## 2024-06-14 NOTE — TELEPHONE ENCOUNTER
Forms/Letter Request    Type of form/letter: Disability      Do we have the form/letter: Yes: Disabilty [employer]    Who is the form from? Patient    Where did/will the form come from? Patient or family brought in       When is form/letter needed by: 6/21/30    How would you like the form/letter returned:     Patient Notified form requests are processed in 5-7 business days:Yes    Could we send this information to you in [employer] or would you prefer to receive a phone call?:   Patient would prefer a phone call   Okay to leave a detailed message?: Yes at Cell number on file:    Telephone Information:   Mobile [phone]   Mobile [phone]        Form is Due on 6/30/24, pt will , please call him when done or if any questions, this is a new form that the company has.  Put in todays office mail for [facility] care team

## 2024-06-26 NOTE — TELEPHONE ENCOUNTER
Left message to call back. Forms completed. He has not signed the authorization to collect/disclose information, he will need to sign this prior to faxing. Form at  for .

## 2024-07-02 DIAGNOSIS — D64.9 ANEMIA, UNSPECIFIED TYPE: ICD-10-CM

## 2024-07-02 DIAGNOSIS — G47.00 INSOMNIA, UNSPECIFIED TYPE: ICD-10-CM

## 2024-07-02 RX ORDER — FERROUS SULFATE 325(65) MG
325 TABLET ORAL
Qty: 90 TABLET | Refills: 1 | Status: SHIPPED | OUTPATIENT
Start: 2024-07-02

## 2024-07-02 RX ORDER — TRAZODONE HYDROCHLORIDE 50 MG/1
TABLET, FILM COATED ORAL
Qty: 135 TABLET | Refills: 1 | Status: SHIPPED | OUTPATIENT
Start: 2024-07-02 | End: 2024-10-02

## 2024-08-16 ENCOUNTER — OFFICE VISIT (OUTPATIENT)
Dept: OPHTHALMOLOGY | Facility: CLINIC | Age: 64
End: 2024-08-16
Payer: MEDICARE

## 2024-08-16 DIAGNOSIS — H40.1131 PRIMARY OPEN ANGLE GLAUCOMA (POAG) OF BOTH EYES, MILD STAGE: ICD-10-CM

## 2024-08-16 PROCEDURE — 92012 INTRM OPH EXAM EST PATIENT: CPT | Performed by: STUDENT IN AN ORGANIZED HEALTH CARE EDUCATION/TRAINING PROGRAM

## 2024-08-16 PROCEDURE — 92133 CPTRZD OPH DX IMG PST SGM ON: CPT | Performed by: STUDENT IN AN ORGANIZED HEALTH CARE EDUCATION/TRAINING PROGRAM

## 2024-08-16 PROCEDURE — 92083 EXTENDED VISUAL FIELD XM: CPT | Performed by: STUDENT IN AN ORGANIZED HEALTH CARE EDUCATION/TRAINING PROGRAM

## 2024-08-16 RX ORDER — LATANOPROST 50 UG/ML
1 SOLUTION/ DROPS OPHTHALMIC AT BEDTIME
Qty: 2.5 ML | Refills: 11 | Status: SHIPPED | OUTPATIENT
Start: 2024-08-16

## 2024-08-16 ASSESSMENT — VISUAL ACUITY
METHOD: SNELLEN - LINEAR
OS_CC: 20/20
OS_CC+: -1
CORRECTION_TYPE: GLASSES
OD_CC: 20/20

## 2024-08-16 ASSESSMENT — EXTERNAL EXAM - RIGHT EYE: OD_EXAM: NORMAL

## 2024-08-16 ASSESSMENT — TONOMETRY
IOP_METHOD: APPLANATION
OS_IOP_MMHG: 11
OD_IOP_MMHG: 12

## 2024-08-16 ASSESSMENT — SLIT LAMP EXAM - LIDS: COMMENTS: 1+ BLEPHARITIS

## 2024-08-16 ASSESSMENT — EXTERNAL EXAM - LEFT EYE: OS_EXAM: NORMAL

## 2024-08-16 NOTE — LETTER
8/16/2024      Zhen Sherman  [address]  [place] [address]      Dear Colleague,    Thank you for referring your patient, Zhen Sherman, to the [facility]. Please see a copy of my visit note below.     Current Eye Medications:  latanoprost - both eyes QHS - last dose: 6pm last night   Dorzolamide - both eyes QAM - last dose: 530am today.      Subjective:  HVF, OCT and IOP - good with using drops daily, vision is stable.     Objective:  See Ophthalmology Exam.    Assessment:  Zhen Sherman is a 63 year old male who presents with:   Encounter Diagnosis   Name Primary?     Primary open angle glaucoma (POAG) of both eyes, mild stage Intraocular pressure 12/11 today. Continue same medications.    OCT optic nerve: avg retinal nerve fiber layer 72/76; thin S&T (watch sup quadrant) right eye; borderline S,T,I, left eye     Chung visual field (HVF) 24-2: within normal limits right eye; moderate scattered loss left eye        Plan:  Continue Latanoprost (green top) every evening both eyes     Continue dorzolamide (orange top) twice a day in both eyes     Newton Kumar MD  [phone]       Again, thank you for allowing me to participate in the care of your patient.        Sincerely,        Newton Kumar MD   none

## 2024-08-16 NOTE — PATIENT INSTRUCTIONS
Continue Latanoprost (green top) every evening both eyes     Continue dorzolamide (orange top) twice a day in both eyes     Newton Kumar MD  [phone]

## 2024-08-16 NOTE — PROGRESS NOTES
Current Eye Medications:  latanoprost - both eyes QHS - last dose: 6pm last night   Dorzolamide - both eyes QAM - last dose: 530am today.      Subjective:  HVF, OCT and IOP - good with using drops daily, vision is stable.     Objective:  See Ophthalmology Exam.    Assessment:  Zhen Sherman is a 63 year old male who presents with:   Encounter Diagnosis   Name Primary?    Primary open angle glaucoma (POAG) of both eyes, mild stage Intraocular pressure 12/11 today. Continue same medications.    OCT optic nerve: avg retinal nerve fiber layer 72/76; thin S&T (watch sup quadrant) right eye; borderline S,T,I, left eye     Chung visual field (HVF) 24-2: within normal limits right eye; moderate scattered loss left eye        Plan:  Continue Latanoprost (green top) every evening both eyes     Continue dorzolamide (orange top) twice a day in both eyes     Newton Kumar MD  [phone]

## 2024-10-02 DIAGNOSIS — G47.00 INSOMNIA, UNSPECIFIED TYPE: ICD-10-CM

## 2024-10-02 RX ORDER — TRAZODONE HYDROCHLORIDE 50 MG/1
TABLET, FILM COATED ORAL
Qty: 135 TABLET | Refills: 0 | Status: SHIPPED | OUTPATIENT
Start: 2024-10-02

## 2025-01-09 ENCOUNTER — OFFICE VISIT (OUTPATIENT)
Dept: PODIATRY | Facility: CLINIC | Age: 65
End: 2025-01-09
Payer: MEDICARE

## 2025-01-09 VITALS
DIASTOLIC BLOOD PRESSURE: 81 MMHG | OXYGEN SATURATION: 97 % | HEART RATE: 61 BPM | SYSTOLIC BLOOD PRESSURE: 132 MMHG | RESPIRATION RATE: 16 BRPM

## 2025-01-09 DIAGNOSIS — L57.0 KERATOMA: Primary | ICD-10-CM

## 2025-01-09 DIAGNOSIS — M20.41 HAMMERTOE, BILATERAL: ICD-10-CM

## 2025-01-09 DIAGNOSIS — M20.42 HAMMERTOE, BILATERAL: ICD-10-CM

## 2025-01-09 ASSESSMENT — PAIN SCALES - GENERAL: PAINLEVEL_OUTOF10: NO PAIN (0)

## 2025-01-09 NOTE — LETTER
1/9/2025      Zhen Sherman  [address]  [place] [address]      Dear Colleague,    Thank you for referring your patient, Zhen Sherman, to the [facility]. Please see a copy of my visit note below.          FOOT AND ANKLE SURGERY/PODIATRY CONSULT NOTE         ASSESSMENT:   Keratoma  Hammertoe      TREATMENT:  -I discussed with the patient that he does have callus tissue buildup along the lateral right midfoot at the base of the fifth metatarsal at the styloid process.  He also has hyperkeratotic buildup along the plantar second MPJ on the right foot.  Patient consents and has signed the ABN waiver for sharp debridement of callus tissue today.    -I trimmed callus tissue x 6 with a #15 blade lateral fifth metatarsal base right foot and plantar second MPJ right foot.    -I discussed with the patient that he does have disruption of normal skin lines along the plantar second MPJ on the right foot which may be evidence of a plantar wart.  I recommend he continue to monitor.    -Also recommend use of a pumice stone or callus shaving device to remove callus tissue buildup 2 to 3 days weekly.    -Patient's questions invited and answered. He was encouraged to call my office with any further questions or concerns.     Gio Guzman DPM  [facility] Podiatry/Foot & Ankle Surgery      HPI: I was asked to see Zhen Sherman today for painful callus tissue along the lateral right foot.  Patient reports he has had pain with ambulation and also while wearing his ski boots due to callus tissue buildup along the lateral right midfoot.  Denies previous treatment for this concern.    Past Medical History:   Diagnosis Date     Cerebral infarction (H)          Social History     Socioeconomic History     Marital status:      Spouse name: Not on file     Number of children: Not on file     Years of education: Not on file     Highest education level: Not on file   Occupational History      Not on file   Tobacco Use     Smoking status: Never     Smokeless tobacco: Never   Vaping Use     Vaping status: Never Used   Substance and Sexual Activity     Alcohol use: Not on file     Drug use: Not on file     Sexual activity: Not on file   Other Topics Concern     Not on file   Social History Narrative     Not on file     Social Drivers of Health     Financial Resource Strain: Low Risk  (1/25/2024)    Financial Resource Strain      Within the past 12 months, have you or your family members you live with been unable to get utilities (heat, electricity) when it was really needed?: No   Food Insecurity: Low Risk  (1/25/2024)    Food Insecurity      Within the past 12 months, did you worry that your food would run out before you got money to buy more?: No      Within the past 12 months, did the food you bought just not last and you didn t have money to get more?: No   Transportation Needs: Low Risk  (1/25/2024)    Transportation Needs      Within the past 12 months, has lack of transportation kept you from medical appointments, getting your medicines, non-medical meetings or appointments, work, or from getting things that you need?: No   Physical Activity: Not on file   Stress: Not on file   Social Connections: Not on file   Interpersonal Safety: Low Risk  (1/31/2024)    Interpersonal Safety      Do you feel physically and emotionally safe where you currently live?: Yes      Within the past 12 months, have you been hit, slapped, kicked or otherwise physically hurt by someone?: No      Within the past 12 months, have you been humiliated or emotionally abused in other ways by your partner or ex-partner?: No   Housing Stability: Low Risk  (1/25/2024)    Housing Stability      Do you have housing? : Yes      Are you worried about losing your housing?: No            Allergies   Allergen Reactions     Pollen Extracts [Pollen Extract] Headache         MEDICATIONS:   Current Outpatient Medications   Medication Sig  Dispense Refill     dorzolamide (TRUSOPT) 2 % ophthalmic solution Place 1 drop into both eyes every morning 5 mL 11     FEROSUL 325 (65 Fe) MG tablet TAKE 1 TABLET(325 MG) BY MOUTH DAILY WITH BREAKFAST 90 tablet 1     latanoprost (XALATAN) 0.005 % ophthalmic solution Place 1 drop into both eyes at bedtime 2.5 mL 11     PARoxetine (PAXIL) 20 MG tablet Take 1 tablet (20 mg) by mouth every morning 90 tablet 3     traZODone (DESYREL) 50 MG tablet TAKE 1 AND 1/2 TABLETS BY MOUTH EVERY NIGHT AT BEDTIME 135 tablet 0     HYDROcodone-acetaminophen (NORCO) 5-325 MG tablet Take 1-2 tablets by mouth every 6 hours as needed for moderate to severe pain (Do not exceed 6 tablets in 1 day) (Patient not taking: Reported on 1/9/2025) 15 tablet 0     ketorolac (TORADOL) 10 MG tablet Take 1 tablet (10 mg) by mouth every 6 hours as needed (Mild to moderate pain) (Patient not taking: Reported on 1/9/2025) 20 tablet 0     ondansetron (ZOFRAN ODT) 4 MG ODT tab Take 1 tablet (4 mg) by mouth every 8 hours as needed for nausea (Patient not taking: Reported on 1/9/2025) 10 tablet 0     No current facility-administered medications for this visit.        Family History   Problem Relation Age of Onset     Glaucoma Mother      Macular Degeneration No family hx of           Review of Systems - 10 point Review of Systems is negative except for right foot pain which is noted in HPI.    OBJECTIVE:  Appearance: alert, well appearing, and in no distress.    VITAL SIGNS: /81   Pulse 61   Resp 16   SpO2 97%       General appearance: Patient is alert and fully cooperative with history & exam.  No sign of distress is noted during the visit.     Psychiatric: Affect is pleasant & appropriate.  Patient appears motivated to improve health.     Respiratory: Breathing is regular & unlabored while sitting.     HEENT: Hearing is intact to spoken word.  Speech is clear.  No gross evidence of visual impairment that would impact ambulation.      Vascular:  Dorsalis pedis palpable bilaterally.  There is no appreciable edema noted.  Dermatologic: Hyperkeratotic tissue x 4 lateral right foot along base of fifth metatarsal.  Hyperkeratotic tissue x 2 plantar second MPJ right foot with possible disruption of normal skin lines.  No erythema bilateral feet.  Neurologic: All epicritic and proprioceptive sensations are grossly intact bilateral.  Musculoskeletal: Contracted digits bilateral feet.          Again, thank you for allowing me to participate in the care of your patient.        Sincerely,        Gio Guzman DPM    Electronically signed

## 2025-01-09 NOTE — PATIENT INSTRUCTIONS
Your doctor recommends you use a pumice stone to get rid of your callous. You can purchase a pumice stone at your local drug store.    How to use your Pumice Stone        1. Soak your calloused skin in warm water. The most common part of the body to exfoliate with a pumice stone is the feet. Heels tend to develop a layer of hard, calloused skin that can become cracked or scaled. Soak the calloused body part in warm water for about five minutes to soften the skin.    2. Wait until your dry skin has softened. The skin will be easier to remove if it's soft and supple. Feel your skin after several minutes of soaking. If it still feels tough, wait a few more minutes (giving the water a warm-up if necessary). If it's soft, your skin is ready for the pumice stone.     3. Wet the stone. Wetting the stone will help it slide more easily across your skin, rather than catching on it. Run the stone under warm water, or dip it in the water where you're soaking your skin, in order to thoroughly wet it.    4. Rub it gently over the calloused area. Use a circular motion to start sloughing away the dead skin with the pumice stone. If the skin is nice and soft, it should start coming right off. Keep going until you remove the dead skin and get to the fresh, supple skin underneath.   Don't press too hard. Light pressure is all that is needed; let the surface of the stone do the work.   If you're working on your feet, focus on the heels, the sides of your toes, and other areas where dry skin tends to build up.    5. Rinse and repeat. Rinse off the dead skin and take a look to see if you need to keep going. If you still see bits of dead skin, go over the area again with the pumice stone. Continue using the stone on the area until you're satisfied with the results.   Since the pumice stone will wear down slightly while you use it, you may need to turn it over to get a fresh surface you can use to exfoliate your skin.   Rinse the pumice  stone often to keep its surface clean and effective.    6. Dry and moisturize your skin. When you're finished, use a towel to pat your skin dry. Coat the area with an oil or cream to prevent it from drying out too quickly. Your formerly calloused skin should now be soft, supple and gleaming.   Coconut oil, almond oil, or body lotion are all fine to use to condition your skin after pumicing.   Repeat as often as needed to keep your skin in good shape.    CARING FOR YOUR PUMICE STONE:    1. Scrub it after use. Dead skin will build up in the pores of the stone as you use it, so you'll want to clean the stone after use. Use a scrub brush to scrub the stone while holding it under running water. Add a bit of soap to help clean the stone completely. This way your stone will be clean and ready to use next time you need it.     2. Allow it to completely dry out. Set the pumice stone in a dry place so that it doesn't stay damp in between uses. Some pumice stones come with a string attached that allows you to hang the stone to dry. If you let the stone stay wet, bacteria could grow in the pores, making it unsafe to use.     3. Boil it if necessary. Every once in a while, you'll want to give the stone a deep cleaning to make sure it isn't harboring bacteria. Bring a small pot of water to a full boil, drop in the stone, and boil it for five minutes. Use tongs to remove the stone from the water and allow it to dry completely before storing.   If you use the stone frequently, boil it every two weeks to ensure it stays clean.   If you'd like, you can add a capful of bleach to the water to be certain all the bacteria is killed.    4. Replace the stone when it wears down. Pumice is a soft stone that will eventually wear away after you've used it for awhile. When it gets too small to handle easily, or the surface becomes too smooth to be effective, purchase a new one. Pumice stones are inexpensive and can be found at any store that  sells beauty supplies.

## 2025-01-09 NOTE — PROGRESS NOTES
FOOT AND ANKLE SURGERY/PODIATRY CONSULT NOTE         ASSESSMENT:   Keratoma  Hammertoe      TREATMENT:  -I discussed with the patient that he does have callus tissue buildup along the lateral right midfoot at the base of the fifth metatarsal at the styloid process.  He also has hyperkeratotic buildup along the plantar second MPJ on the right foot.  Patient consents and has signed the ABN waiver for sharp debridement of callus tissue today.    -I trimmed callus tissue x 6 with a #15 blade lateral fifth metatarsal base right foot and plantar second MPJ right foot.    -I discussed with the patient that he does have disruption of normal skin lines along the plantar second MPJ on the right foot which may be evidence of a plantar wart.  I recommend he continue to monitor.    -Also recommend use of a pumice stone or callus shaving device to remove callus tissue buildup 2 to 3 days weekly.    -Patient's questions invited and answered. He was encouraged to call my office with any further questions or concerns.     Gio Guzman DPM  [facility] Podiatry/Foot & Ankle Surgery      HPI: I was asked to see Zhen Sherman today for painful callus tissue along the lateral right foot.  Patient reports he has had pain with ambulation and also while wearing his ski boots due to callus tissue buildup along the lateral right midfoot.  Denies previous treatment for this concern.    Past Medical History:   Diagnosis Date    Cerebral infarction (H)          Social History     Socioeconomic History    Marital status:      Spouse name: Not on file    Number of children: Not on file    Years of education: Not on file    Highest education level: Not on file   Occupational History    Not on file   Tobacco Use    Smoking status: Never    Smokeless tobacco: Never   Vaping Use    Vaping status: Never Used   Substance and Sexual Activity    Alcohol use: Not on file    Drug use: Not on file    Sexual activity: Not on file    Other Topics Concern    Not on file   Social History Narrative    Not on file     Social Drivers of Health     Financial Resource Strain: Low Risk  (1/25/2024)    Financial Resource Strain     Within the past 12 months, have you or your family members you live with been unable to get utilities (heat, electricity) when it was really needed?: No   Food Insecurity: Low Risk  (1/25/2024)    Food Insecurity     Within the past 12 months, did you worry that your food would run out before you got money to buy more?: No     Within the past 12 months, did the food you bought just not last and you didn t have money to get more?: No   Transportation Needs: Low Risk  (1/25/2024)    Transportation Needs     Within the past 12 months, has lack of transportation kept you from medical appointments, getting your medicines, non-medical meetings or appointments, work, or from getting things that you need?: No   Physical Activity: Not on file   Stress: Not on file   Social Connections: Not on file   Interpersonal Safety: Low Risk  (1/31/2024)    Interpersonal Safety     Do you feel physically and emotionally safe where you currently live?: Yes     Within the past 12 months, have you been hit, slapped, kicked or otherwise physically hurt by someone?: No     Within the past 12 months, have you been humiliated or emotionally abused in other ways by your partner or ex-partner?: No   Housing Stability: Low Risk  (1/25/2024)    Housing Stability     Do you have housing? : Yes     Are you worried about losing your housing?: No            Allergies   Allergen Reactions    Pollen Extracts [Pollen Extract] Headache         MEDICATIONS:   Current Outpatient Medications   Medication Sig Dispense Refill    dorzolamide (TRUSOPT) 2 % ophthalmic solution Place 1 drop into both eyes every morning 5 mL 11    FEROSUL 325 (65 Fe) MG tablet TAKE 1 TABLET(325 MG) BY MOUTH DAILY WITH BREAKFAST 90 tablet 1    latanoprost (XALATAN) 0.005 % ophthalmic  solution Place 1 drop into both eyes at bedtime 2.5 mL 11    PARoxetine (PAXIL) 20 MG tablet Take 1 tablet (20 mg) by mouth every morning 90 tablet 3    traZODone (DESYREL) 50 MG tablet TAKE 1 AND 1/2 TABLETS BY MOUTH EVERY NIGHT AT BEDTIME 135 tablet 0    HYDROcodone-acetaminophen (NORCO) 5-325 MG tablet Take 1-2 tablets by mouth every 6 hours as needed for moderate to severe pain (Do not exceed 6 tablets in 1 day) (Patient not taking: Reported on 1/9/2025) 15 tablet 0    ketorolac (TORADOL) 10 MG tablet Take 1 tablet (10 mg) by mouth every 6 hours as needed (Mild to moderate pain) (Patient not taking: Reported on 1/9/2025) 20 tablet 0    ondansetron (ZOFRAN ODT) 4 MG ODT tab Take 1 tablet (4 mg) by mouth every 8 hours as needed for nausea (Patient not taking: Reported on 1/9/2025) 10 tablet 0     No current facility-administered medications for this visit.        Family History   Problem Relation Age of Onset    Glaucoma Mother     Macular Degeneration No family hx of           Review of Systems - 10 point Review of Systems is negative except for right foot pain which is noted in HPI.    OBJECTIVE:  Appearance: alert, well appearing, and in no distress.    VITAL SIGNS: /81   Pulse 61   Resp 16   SpO2 97%       General appearance: Patient is alert and fully cooperative with history & exam.  No sign of distress is noted during the visit.     Psychiatric: Affect is pleasant & appropriate.  Patient appears motivated to improve health.     Respiratory: Breathing is regular & unlabored while sitting.     HEENT: Hearing is intact to spoken word.  Speech is clear.  No gross evidence of visual impairment that would impact ambulation.      Vascular: Dorsalis pedis palpable bilaterally.  There is no appreciable edema noted.  Dermatologic: Hyperkeratotic tissue x 4 lateral right foot along base of fifth metatarsal.  Hyperkeratotic tissue x 2 plantar second MPJ right foot with possible disruption of normal skin lines.   No erythema bilateral feet.  Neurologic: All epicritic and proprioceptive sensations are grossly intact bilateral.  Musculoskeletal: Contracted digits bilateral feet.

## 2025-01-23 SDOH — HEALTH STABILITY: PHYSICAL HEALTH: ON AVERAGE, HOW MANY DAYS PER WEEK DO YOU ENGAGE IN MODERATE TO STRENUOUS EXERCISE (LIKE A BRISK WALK)?: 5 DAYS

## 2025-01-23 SDOH — HEALTH STABILITY: PHYSICAL HEALTH: ON AVERAGE, HOW MANY MINUTES DO YOU ENGAGE IN EXERCISE AT THIS LEVEL?: 40 MIN

## 2025-01-23 ASSESSMENT — SOCIAL DETERMINANTS OF HEALTH (SDOH): HOW OFTEN DO YOU GET TOGETHER WITH FRIENDS OR RELATIVES?: ONCE A WEEK

## 2025-01-27 ASSESSMENT — PATIENT HEALTH QUESTIONNAIRE - PHQ9
SUM OF ALL RESPONSES TO PHQ QUESTIONS 1-9: 5
SUM OF ALL RESPONSES TO PHQ QUESTIONS 1-9: 5
10. IF YOU CHECKED OFF ANY PROBLEMS, HOW DIFFICULT HAVE THESE PROBLEMS MADE IT FOR YOU TO DO YOUR WORK, TAKE CARE OF THINGS AT HOME, OR GET ALONG WITH OTHER PEOPLE: NOT DIFFICULT AT ALL

## 2025-01-28 ENCOUNTER — OFFICE VISIT (OUTPATIENT)
Dept: FAMILY MEDICINE | Facility: CLINIC | Age: 65
End: 2025-01-28
Payer: MEDICARE

## 2025-01-28 VITALS
DIASTOLIC BLOOD PRESSURE: 78 MMHG | BODY MASS INDEX: 21.6 KG/M2 | HEART RATE: 73 BPM | WEIGHT: 163 LBS | SYSTOLIC BLOOD PRESSURE: 124 MMHG | TEMPERATURE: 98 F | RESPIRATION RATE: 16 BRPM | HEIGHT: 73 IN | OXYGEN SATURATION: 100 %

## 2025-01-28 DIAGNOSIS — Z00.00 HEALTH CARE MAINTENANCE: Primary | ICD-10-CM

## 2025-01-28 DIAGNOSIS — F33.1 MODERATE EPISODE OF RECURRENT MAJOR DEPRESSIVE DISORDER (H): ICD-10-CM

## 2025-01-28 DIAGNOSIS — Z12.5 SCREENING FOR PROSTATE CANCER: ICD-10-CM

## 2025-01-28 DIAGNOSIS — I69.320 APHASIA AS LATE EFFECT OF CEREBROVASCULAR ACCIDENT (CVA): ICD-10-CM

## 2025-01-28 DIAGNOSIS — Z23 ENCOUNTER FOR IMMUNIZATION: ICD-10-CM

## 2025-01-28 LAB
ALBUMIN SERPL BCG-MCNC: 3.7 G/DL (ref 3.5–5.2)
ALP SERPL-CCNC: 157 U/L (ref 40–150)
ALT SERPL W P-5'-P-CCNC: 14 U/L (ref 0–70)
ANION GAP SERPL CALCULATED.3IONS-SCNC: 8 MMOL/L (ref 7–15)
AST SERPL W P-5'-P-CCNC: 16 U/L (ref 0–45)
BILIRUB SERPL-MCNC: 0.3 MG/DL
BUN SERPL-MCNC: 11 MG/DL (ref 8–23)
CALCIUM SERPL-MCNC: 9.4 MG/DL (ref 8.8–10.4)
CHLORIDE SERPL-SCNC: 105 MMOL/L (ref 98–107)
CHOLEST SERPL-MCNC: 69 MG/DL
CREAT SERPL-MCNC: 0.84 MG/DL (ref 0.67–1.17)
EGFRCR SERPLBLD CKD-EPI 2021: >90 ML/MIN/1.73M2
ERYTHROCYTE [DISTWIDTH] IN BLOOD BY AUTOMATED COUNT: 17 % (ref 10–15)
FASTING STATUS PATIENT QL REPORTED: NO
FASTING STATUS PATIENT QL REPORTED: NO
FERRITIN SERPL-MCNC: 48 NG/ML (ref 31–409)
GLUCOSE SERPL-MCNC: 81 MG/DL (ref 70–99)
HCO3 SERPL-SCNC: 27 MMOL/L (ref 22–29)
HCT VFR BLD AUTO: 30.9 % (ref 40–53)
HDLC SERPL-MCNC: 23 MG/DL
HGB BLD-MCNC: 9.5 G/DL (ref 13.3–17.7)
LDLC SERPL CALC-MCNC: 35 MG/DL
MCH RBC QN AUTO: 25 PG (ref 26.5–33)
MCHC RBC AUTO-ENTMCNC: 30.7 G/DL (ref 31.5–36.5)
MCV RBC AUTO: 81 FL (ref 78–100)
NONHDLC SERPL-MCNC: 46 MG/DL
PLATELET # BLD AUTO: 283 10E3/UL (ref 150–450)
POTASSIUM SERPL-SCNC: 4.2 MMOL/L (ref 3.4–5.3)
PROT SERPL-MCNC: 7 G/DL (ref 6.4–8.3)
PSA SERPL DL<=0.01 NG/ML-MCNC: 1.93 NG/ML (ref 0–4.5)
RBC # BLD AUTO: 3.8 10E6/UL (ref 4.4–5.9)
SODIUM SERPL-SCNC: 140 MMOL/L (ref 135–145)
TRIGL SERPL-MCNC: 54 MG/DL
WBC # BLD AUTO: 4.7 10E3/UL (ref 4–11)

## 2025-01-28 PROCEDURE — G2211 COMPLEX E/M VISIT ADD ON: HCPCS | Performed by: FAMILY MEDICINE

## 2025-01-28 PROCEDURE — 99213 OFFICE O/P EST LOW 20 MIN: CPT | Mod: 25 | Performed by: FAMILY MEDICINE

## 2025-01-28 PROCEDURE — 36415 COLL VENOUS BLD VENIPUNCTURE: CPT | Performed by: FAMILY MEDICINE

## 2025-01-28 PROCEDURE — G0103 PSA SCREENING: HCPCS | Performed by: FAMILY MEDICINE

## 2025-01-28 PROCEDURE — 80053 COMPREHEN METABOLIC PANEL: CPT | Performed by: FAMILY MEDICINE

## 2025-01-28 PROCEDURE — 82728 ASSAY OF FERRITIN: CPT | Mod: GZ | Performed by: FAMILY MEDICINE

## 2025-01-28 PROCEDURE — 90677 PCV20 VACCINE IM: CPT | Performed by: FAMILY MEDICINE

## 2025-01-28 PROCEDURE — G0009 ADMIN PNEUMOCOCCAL VACCINE: HCPCS | Performed by: FAMILY MEDICINE

## 2025-01-28 PROCEDURE — 85027 COMPLETE CBC AUTOMATED: CPT | Performed by: FAMILY MEDICINE

## 2025-01-28 PROCEDURE — 80061 LIPID PANEL: CPT | Performed by: FAMILY MEDICINE

## 2025-01-28 PROCEDURE — G0438 PPPS, INITIAL VISIT: HCPCS | Performed by: FAMILY MEDICINE

## 2025-01-28 RX ORDER — PAROXETINE 20 MG/1
20 TABLET, FILM COATED ORAL EVERY MORNING
Qty: 90 TABLET | Refills: 3 | Status: SHIPPED | OUTPATIENT
Start: 2025-01-28

## 2025-01-28 NOTE — PROGRESS NOTES
Preventive Care Visit  [facility]  Huy Bruce MD, Family Medicine  Jan 28, 2025      Assessment & Plan     Health care maintenance  Patient not fasting able obtain blood work and follow-up based on results  - Pneumococcal 20 Valent Conjugate (PCV20)  - Comprehensive metabolic panel (BMP + Alb, Alk Phos, ALT, AST, Total. Bili, TP)  - CBC with platelets  - Lipid Profile (Chol, Trig, HDL, LDL calc)  - PSA, screen  - Ferritin  - PARoxetine (PAXIL) 20 MG tablet  Dispense: 90 tablet; Refill: 3  - Comprehensive metabolic panel (BMP + Alb, Alk Phos, ALT, AST, Total. Bili, TP)  - CBC with platelets  - Lipid Profile (Chol, Trig, HDL, LDL calc)  - PSA, screen  - Ferritin    Screening for prostate cancer  Patiently to screen for prostate cancer  - PSA, screen  - PSA, screen    Encounter for immunization  Will update with pneumococcal 20  - Pneumococcal 20 Valent Conjugate (PCV20)    Moderate episode of recurrent major depressive disorder (H)  Patient feels current dosing is working well continue with refills    Aphasia as late effect of cerebrovascular accident (CVA)  Status post renal vascular accident years ago was still left with some difficulty with words and understanding verbally does better with reading  Patient continues to not be able to perform his previous job [place]      Patient has been advised of split billing requirements and indicates understanding: Yes        Counseling  Appropriate preventive services were addressed with this patient via screening, questionnaire, or discussion as appropriate for fall prevention, nutrition, physical activity, Tobacco-use cessation, social engagement, weight loss and cognition.  Checklist reviewing preventive services available has been given to the patient.  Reviewed patient's diet, addressing concerns and/or questions.   Updated plan of care.  Patient reported difficulty with activities of daily living  were addressed today.The patient's PHQ-9 score is consistent with mild depression. He was provided with information regarding depression.           Brittany Fontaine is a 64 year old, presenting for the following:  Wellness Visit (Not fasting )        1/28/2025     7:15 AM   Additional Questions   Roomed by Kayce ZUNIGA  Here for annual exam.  Patient is not fasting but we will obtain blood work today.  Spent some time discussing the pneumonia vaccine which patient was having difficulty understanding after discussion he is wanting to update with pneumococcal 20 today.  Will obtain blood work and continue screen for prostate cancer.  Patient is exercising on a regular basis.  Patient continues to be not working as previous job secondary to a cerebrovascular accident leaving with aphasia problems-does volunteer some time [place].  Feels current dose and serotonin working well.        Health Care Directive  Patient does not have a Health Care Directive: Patient states has Advance Directive and will bring in a copy to clinic.      1/23/2025   General Health   How would you rate your overall physical health? Good   Feel stress (tense, anxious, or unable to sleep) Only a little   (!) STRESS CONCERN      1/23/2025   Nutrition   Diet: Regular (no restrictions)         1/23/2025   Exercise   Days per week of moderate/strenous exercise 5 days   Average minutes spent exercising at this level 40 min         1/23/2025   Social Factors   Frequency of gathering with friends or relatives Once a week   Worry food won't last until get money to buy more No   Food not last or not have enough money for food? No   Do you have housing? (Housing is defined as stable permanent housing and does not include staying ouside in a car, in a tent, in an abandoned building, in an overnight shelter, or couch-surfing.) Yes   Are you worried about losing your housing? No   Lack of transportation? No   Unable to get  utilities (heat,electricity)? No         1/23/2025   Fall Risk   Fallen 2 or more times in the past year? No   Trouble with walking or balance? No          1/23/2025   Activities of Daily Living- Home Safety   Needs help with the following daily activites Telephone use    Transportation    Shopping    Preparing meals    Housework    Bathing    Laundry    Medication administration    Money management    Toileting    Dressing   Safety concerns in the home None of the above       Multiple values from one day are sorted in reverse-chronological order         1/23/2025   Dental   Dentist two times every year? Yes         1/23/2025   Hearing Screening   Hearing concerns? None of the above         1/23/2025   Driving Risk Screening   Patient/family members have concerns about driving No         1/23/2025   General Alertness/Fatigue Screening   Have you been more tired than usual lately? No         1/23/2025   Urinary Incontinence Screening   Bothered by leaking urine in past 6 months No         1/23/2025   TB Screening   Were you born outside of the [place]? No       Today's PHQ-9 Score:       1/27/2025     8:27 AM   PHQ-9 SCORE   PHQ-9 Total Score MyChart 5 (Mild depression)   PHQ-9 Total Score 5        Patient-reported         1/23/2025   Substance Use   Alcohol more than 3/day or more than 7/wk No   Do you have a current opioid prescription? No   How severe/bad is pain from 1 to 10? 2/10   Do you use any other substances recreationally? No     Social History     Tobacco Use    Smoking status: Never    Smokeless tobacco: Never   Vaping Use    Vaping status: Never Used             1/23/2025   One time HIV Screening   Previous HIV test? No   Last PSA:   Prostate Specific Antigen Screen   Date Value Ref Range Status   02/01/2024 2.82 0.00 - 4.50 ng/mL Final   09/14/2021 2.42 0.00 - 3.50 ug/L Final     ASCVD Risk   The ASCVD Risk score (Bib ARANGO, et al., 2019) failed to calculate for the following reasons:    Risk score  "cannot be calculated because patient has a medical history suggesting prior/existing ASCVD            Reviewed and updated as needed this visit by Provider                      Current providers sharing in care for this patient include:  Patient Care Team:  Huy Bruce MD as PCP - General (Family Medicine)  Karen Silva MD as MD (Physical Medicine and Rehabilitation)  Huy Bruce MD as Assigned PCP  Newton Kumar MD as MD (Ophthalmology)  Carlie Gonsales RN as Registered Nurse  Newton Kumar MD as Assigned Surgical Provider    The following health maintenance items are reviewed in Epic and correct as of today:  Health Maintenance   Topic Date Due    ANNUAL REVIEW OF HM ORDERS  Never done    DEPRESSION ACTION PLAN  Never done    HIV SCREENING  Never done    HEPATITIS C SCREENING  Never done    Pneumococcal Vaccine: 50+ Years (1 of 2 - PCV) Never done    ZOSTER IMMUNIZATION (1 of 2) Never done    RSV VACCINE (1 - Risk 60-74 years 1-dose series) Never done    INFLUENZA VACCINE (1) 09/01/2024    COVID-19 Vaccine (5 - 2024-25 season) 09/01/2024    MEDICARE ANNUAL WELLNESS VISIT  01/31/2025    PHQ-9  07/28/2025    GLUCOSE  03/24/2027    LIPID  02/01/2029    ADVANCE CARE PLANNING  03/19/2029    DTAP/TDAP/TD IMMUNIZATION (3 - Td or Tdap) 09/14/2031    COLORECTAL CANCER SCREENING  03/22/2034    HPV IMMUNIZATION  Aged Out    MENINGITIS IMMUNIZATION  Aged Out    RSV MONOCLONAL ANTIBODY  Aged Out            Objective    Exam  /78 (BP Location: Left arm, Patient Position: Sitting, Cuff Size: Adult Regular)   Pulse 73   Temp 98  F (36.7  C) (Oral)   Resp 16   Ht 1.86 m (6' 1.23\")   Wt 73.9 kg (163 lb)   SpO2 100%   BMI 21.37 kg/m     Estimated body mass index is 21.37 kg/m  as calculated from the following:    Height as of this encounter: 1.86 m (6' 1.23\").    Weight as of this encounter: 73.9 kg (163 lb).    Physical Exam  GENERAL: alert and no distress  EYES: Eyes grossly " normal to inspection, PERRL and conjunctivae and sclerae normal  HENT: ear canals and TM's normal, nose and mouth without ulcers or lesions  RESP: lungs clear to auscultation - no rales, rhonchi or wheezes  CV: regular rate and rhythm, normal S1 S2, no S3 or S4, no murmur, click or rub, no peripheral edema  MS: no gross musculoskeletal defects noted, no edema  NEURO: Normal strength and tone, mentation intact and speech normal  PSYCH: mentation appears normal, affect normal/bright         1/28/2025   Mini Cog   Clock Draw Score 0 Abnormal   3 Item Recall 2 objects recalled   Mini Cog Total Score 2            The longitudinal plan of care for the diagnosis(es)/condition(s) as documented were addressed during this visit. Due to the added complexity in care, I will continue to support Zhen in the subsequent management and with ongoing continuity of care.  Signed Electronically by: Huy Bruce MD    Answers submitted by the patient for this visit:  Patient Health Questionnaire (Submitted on 1/27/2025)  If you checked off any problems, how difficult have these problems made it for you to do your work, take care of things at home, or get along with other people?: Not difficult at all  PHQ9 TOTAL SCORE: 5

## 2025-01-30 ENCOUNTER — TRANSFERRED RECORDS (OUTPATIENT)
Dept: HEALTH INFORMATION MANAGEMENT | Facility: CLINIC | Age: 65
End: 2025-01-30
Payer: MEDICARE

## 2025-02-04 ENCOUNTER — TRANSFERRED RECORDS (OUTPATIENT)
Dept: HEALTH INFORMATION MANAGEMENT | Facility: CLINIC | Age: 65
End: 2025-02-04
Payer: MEDICARE

## 2025-02-18 ENCOUNTER — OFFICE VISIT (OUTPATIENT)
Dept: OPHTHALMOLOGY | Facility: CLINIC | Age: 65
End: 2025-02-18
Payer: MEDICARE

## 2025-02-18 DIAGNOSIS — H40.1131 PRIMARY OPEN ANGLE GLAUCOMA (POAG) OF BOTH EYES, MILD STAGE: ICD-10-CM

## 2025-02-18 PROCEDURE — 92012 INTRM OPH EXAM EST PATIENT: CPT | Performed by: STUDENT IN AN ORGANIZED HEALTH CARE EDUCATION/TRAINING PROGRAM

## 2025-02-18 RX ORDER — DORZOLAMIDE HCL 20 MG/ML
1 SOLUTION/ DROPS OPHTHALMIC EVERY MORNING
Qty: 5 ML | Refills: 11 | Status: SHIPPED | OUTPATIENT
Start: 2025-02-18

## 2025-02-18 ASSESSMENT — VISUAL ACUITY
CORRECTION_TYPE: GLASSES
METHOD: SNELLEN - LINEAR
OS_CC: 20/20
OD_CC: 20/20

## 2025-02-18 ASSESSMENT — SLIT LAMP EXAM - LIDS: COMMENTS: 1+ BLEPHARITIS

## 2025-02-18 ASSESSMENT — TONOMETRY
OS_IOP_MMHG: 15
OD_IOP_MMHG: 13
IOP_METHOD: APPLANATION

## 2025-02-18 ASSESSMENT — EXTERNAL EXAM - LEFT EYE: OS_EXAM: NORMAL

## 2025-02-18 ASSESSMENT — EXTERNAL EXAM - RIGHT EYE: OD_EXAM: NORMAL

## 2025-02-18 NOTE — PROGRESS NOTES
Current Eye Medications:   Latanoprost (green top) every evening both eyes, last took at 5:30 pm.   dorzolamide (orange top) twice a day in both eyes, last took at 6 am.         Subjective:  6 month follow up for IOP check. Vision is doing well both eyes distance and near. No eye pain or discomfort in either eye.      Objective:  See Ophthalmology Exam.       Assessment:  Zhen Sherman is a 64 year old male who presents with:   Encounter Diagnosis   Name Primary?    Primary open angle glaucoma (POAG) of both eyes, mild stage Intraocular pressure 13/15 today. Continue same medications.       Plan:  Continue Latanoprost (green top) every evening both eyes     Continue dorzolamide (orange top) twice a day in both eyes     Newton Kumar MD  [phone]

## 2025-02-18 NOTE — PATIENT INSTRUCTIONS
Continue Latanoprost (green top) every evening both eyes     Continue dorzolamide (orange top) twice a day in both eyes     Newton Kumar MD  [phone]

## 2025-02-18 NOTE — LETTER
2/18/2025      Zhen Shermna  [address]  [place] [address]      Dear Colleague,    Thank you for referring your patient, Zhen Sherman, to the [facility]. Please see a copy of my visit note below.     Current Eye Medications:   Latanoprost (green top) every evening both eyes, last took at 5:30 pm.   dorzolamide (orange top) twice a day in both eyes, last took at 6 am.         Subjective:  6 month follow up for IOP check. Vision is doing well both eyes distance and near. No eye pain or discomfort in either eye.      Objective:  See Ophthalmology Exam.       Assessment:  Zhen Sherman is a 64 year old male who presents with:   Encounter Diagnosis   Name Primary?     Primary open angle glaucoma (POAG) of both eyes, mild stage Intraocular pressure 13/15 today. Continue same medications.       Plan:  Continue Latanoprost (green top) every evening both eyes     Continue dorzolamide (orange top) twice a day in both eyes     Newton Kumar MD  [phone]     Again, thank you for allowing me to participate in the care of your patient.        Sincerely,        Newton Kumar MD    Electronically signed

## 2025-03-03 ENCOUNTER — OFFICE VISIT (OUTPATIENT)
Dept: FAMILY MEDICINE | Facility: CLINIC | Age: 65
End: 2025-03-03
Payer: MEDICARE

## 2025-03-03 VITALS
OXYGEN SATURATION: 93 % | DIASTOLIC BLOOD PRESSURE: 68 MMHG | WEIGHT: 162 LBS | HEART RATE: 58 BPM | TEMPERATURE: 97.6 F | SYSTOLIC BLOOD PRESSURE: 114 MMHG | BODY MASS INDEX: 21.24 KG/M2 | RESPIRATION RATE: 16 BRPM

## 2025-03-03 DIAGNOSIS — H10.32 ACUTE BACTERIAL CONJUNCTIVITIS OF LEFT EYE: Primary | ICD-10-CM

## 2025-03-03 PROCEDURE — 3074F SYST BP LT 130 MM HG: CPT | Performed by: FAMILY MEDICINE

## 2025-03-03 PROCEDURE — 3078F DIAST BP <80 MM HG: CPT | Performed by: FAMILY MEDICINE

## 2025-03-03 PROCEDURE — 99213 OFFICE O/P EST LOW 20 MIN: CPT | Performed by: FAMILY MEDICINE

## 2025-03-03 RX ORDER — POLYMYXIN B SULFATE AND TRIMETHOPRIM 1; 10000 MG/ML; [USP'U]/ML
SOLUTION OPHTHALMIC
Qty: 10 ML | Refills: 0 | Status: SHIPPED | OUTPATIENT
Start: 2025-03-03 | End: 2025-03-10

## 2025-03-03 NOTE — PATIENT INSTRUCTIONS
Zhen,    I sent a prescription for antibiotic drops for your left eye  Apply warm compresses  Make sure to wash your hands regularly   Please follow-up if symptoms are not improving    Rodri Mack MD

## 2025-03-03 NOTE — PROGRESS NOTES
Assessment & Plan     Acute bacterial conjunctivitis of left eye    Recommend Polytrim eyedrops  Recommend applying warm compresses to the left eye  Recommend regular handwashing  Monitor for increased redness and swelling  Recommend immediate follow-up if developing significant pain, increasing redness, fever, or visual changes    Patient agrees to plan      - polymixin b-trimethoprim (POLYTRIM) 92111-2.1 UNIT/ML-% ophthalmic solution; 1 drop in affected eye(s) every 3 hrs while awake for 5-7 days until resolved - max 6 doses per day                Brittany Fontaine is a 64 year old, presenting for the following health issues:  Conjunctivitis        3/3/2025     8:11 AM   Additional Questions   Roomed by Mandie LU CMA     History of Present Illness       Reason for visit:  Pink eye symptoms  Symptom onset:  Today  Symptoms include:  Eye red, discharge, not clear  Symptom intensity:  Moderate  Symptom progression:  Worsening  Had these symptoms before:  Julissa Fontaine is a pleasant 64-year-old male who presents to the clinic with a 2-day history of left eye. He has had redness  Of his left eye and has developed a discharge as well.  He denies fever or chills.  He has not had significant eye pain.  Vision has been mildly blurred.  He does have a history of  of increased pressure in his left eye and uses topical drops.  He denies respiratory symptoms of concern.                  Objective    /68 (BP Location: Left arm, Patient Position: Sitting, Cuff Size: Adult Regular)   Pulse 58   Temp 97.6  F (36.4  C) (Oral)   Resp 16   Wt 73.5 kg (162 lb)   SpO2 93%   BMI 21.24 kg/m    Body mass index is 21.24 kg/m .  Physical Exam   GENERAL: alert and no distress  EYES: PERRL and there is scleral injection of the left eye with a greenish discharge noted  There is minimal swelling of the left upper eyelid  PSYCH: mentation appears normal, affect normal/bright            Signed Electronically by: Rodri Mack,  MD

## 2025-03-06 ENCOUNTER — TELEPHONE (OUTPATIENT)
Dept: FAMILY MEDICINE | Facility: CLINIC | Age: 65
End: 2025-03-06
Payer: MEDICARE

## 2025-03-06 NOTE — TELEPHONE ENCOUNTER
Patient presented to clinic in person with concern over worsening left eye symptoms.  Patient was evaluated on March 3 and diagnosed with left bacterial conjunctivitis.    Upon exam patient's right eye is completely normal.  Left eye has obvious subconjunctival hemorrhage encircling the left side of the iris.  Otherwise, no eyelid swelling or redness is noted.  Patient reports vision as being a touch blurry which is unchanged from his exam on March 3.    Reviewed situation with PCP who is in clinic.  Advice is to continue using his eyedrops for another few days and allow the subconjunctival hemorrhage to resolve on its own over the next 1 to 2 weeks.    Miguel Horvath RN     [facility]     Negative

## 2025-03-11 ENCOUNTER — OFFICE VISIT (OUTPATIENT)
Dept: FAMILY MEDICINE | Facility: CLINIC | Age: 65
End: 2025-03-11
Payer: MEDICARE

## 2025-03-11 VITALS
DIASTOLIC BLOOD PRESSURE: 72 MMHG | HEIGHT: 73 IN | OXYGEN SATURATION: 100 % | TEMPERATURE: 97.4 F | BODY MASS INDEX: 21.15 KG/M2 | HEART RATE: 62 BPM | WEIGHT: 159.6 LBS | RESPIRATION RATE: 16 BRPM | SYSTOLIC BLOOD PRESSURE: 136 MMHG

## 2025-03-11 DIAGNOSIS — S01.81XA CHIN LACERATION, INITIAL ENCOUNTER: Primary | ICD-10-CM
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
## 2025-03-11 PROCEDURE — 99213 OFFICE O/P EST LOW 20 MIN: CPT | Performed by: FAMILY MEDICINE

## 2025-03-11 PROCEDURE — 3075F SYST BP GE 130 - 139MM HG: CPT | Performed by: FAMILY MEDICINE

## 2025-03-11 PROCEDURE — 3078F DIAST BP <80 MM HG: CPT | Performed by: FAMILY MEDICINE

## 2025-03-11 NOTE — PROGRESS NOTES
"  Assessment & Plan     Chin laceration, initial encounter  Seems to be healing well and closing  Area Cleaned  Advised use bacitracin daily  Follow up 1 week if any sign of Infection, reness/ swelling     Fall, initial encounter  No Other Injuries          Subjective   Zhen is a 64 year old, presenting for the following health issues:  Wound Check (Chin, Fell on Saturday)      3/11/2025    10:17 AM   Additional Questions   Roomed by Jinny NORMAN CMA   Accompanied by Self         3/11/2025    10:17 AM   Patient Reported Additional Medications   Patient reports taking the following new medications None     HPI        Fell on Saturday night. Hit chin. Possible stitches.  Fell 3 days ago on Saturday  Tripped in a Grocery store and Hit chin  Had some Bleeding  Has been using some antibiotic ointment  No Other Injuries   Able to eat without Problems  No pain  No LOC or head Injury  No neck pain  Has been going to work with out Problems            Review of Systems  Constitutional, neuro, ENT, endocrine, pulmonary, cardiac, gastrointestinal, genitourinary, musculoskeletal, integument and psychiatric systems are negative, except as otherwise noted.      Objective    /72 (BP Location: Left arm, Patient Position: Sitting, Cuff Size: Adult Regular)   Pulse 62   Temp 97.4  F (36.3  C) (Oral)   Resp 16   Ht 1.854 m (6' 1\")   Wt 72.4 kg (159 lb 9.6 oz)   SpO2 100%   BMI 21.06 kg/m    Body mass index is 21.06 kg/m .  Physical Exam   GENERAL: alert and no distress  HENT: normal cephalic/atraumatic, nose and mouth without ulcers or lesions, oropharynx clear, oral mucous membranes moist, and   Skin - 2 cm laceration chin -seems to be healing well  NECK: no adenopathy, no asymmetry, masses, or scars  RESP: lungs clear to auscultation - no rales, rhonchi or wheezes  CV: regular rate and rhythm, normal S1 S2, no S3 or S4, no murmur, click or rub, no peripheral edema  MS: no gross musculoskeletal defects noted, no edema        "     Signed Electronically by: Danni Arauz MD

## 2025-06-24 DIAGNOSIS — G47.00 INSOMNIA, UNSPECIFIED TYPE: ICD-10-CM

## 2025-07-02 RX ORDER — TRAZODONE HYDROCHLORIDE 50 MG/1
50 TABLET ORAL AT BEDTIME
Qty: 90 TABLET | Refills: 2 | Status: SHIPPED | OUTPATIENT
Start: 2025-07-02

## (undated) DEVICE — SOL WATER IRRIG 1000ML BOTTLE 2F7114

## (undated) DEVICE — TUBING SUCTION MEDI-VAC 1/4"X20' N620A

## (undated) DEVICE — SUCTION MANIFOLD NEPTUNE 2 SYS 1 PORT 702-025-000